# Patient Record
Sex: FEMALE | Race: WHITE | NOT HISPANIC OR LATINO | Employment: OTHER | RURAL
[De-identification: names, ages, dates, MRNs, and addresses within clinical notes are randomized per-mention and may not be internally consistent; named-entity substitution may affect disease eponyms.]

---

## 2018-11-06 VITALS
RESPIRATION RATE: 16 BRPM | WEIGHT: 170.8 LBS | HEART RATE: 100 BPM | OXYGEN SATURATION: 92 % | BODY MASS INDEX: 33.53 KG/M2 | SYSTOLIC BLOOD PRESSURE: 144 MMHG | DIASTOLIC BLOOD PRESSURE: 88 MMHG | HEIGHT: 60 IN

## 2018-11-06 RX ORDER — LOSARTAN POTASSIUM 50 MG/1
100 TABLET ORAL DAILY
COMMUNITY
End: 2021-04-07 | Stop reason: SDUPTHER

## 2018-11-06 RX ORDER — CLONIDINE 0.2 MG/24H
1 PATCH, EXTENDED RELEASE TRANSDERMAL WEEKLY
COMMUNITY
End: 2018-12-06 | Stop reason: SDUPTHER

## 2018-11-06 RX ORDER — FENTANYL 75 UG/H
1 PATCH TRANSDERMAL
COMMUNITY
End: 2018-11-07 | Stop reason: ALTCHOICE

## 2018-11-06 RX ORDER — AMLODIPINE BESYLATE 5 MG/1
5 TABLET ORAL DAILY
COMMUNITY
End: 2018-11-07 | Stop reason: DRUGHIGH

## 2018-11-06 RX ORDER — FUROSEMIDE 40 MG/1
40 TABLET ORAL DAILY
COMMUNITY
End: 2019-07-19 | Stop reason: SDUPTHER

## 2018-11-06 RX ORDER — PANTOPRAZOLE SODIUM 40 MG/1
40 TABLET, DELAYED RELEASE ORAL DAILY
COMMUNITY
End: 2020-05-11

## 2018-11-07 ENCOUNTER — OFFICE VISIT (OUTPATIENT)
Dept: FAMILY MEDICINE CLINIC | Facility: CLINIC | Age: 68
End: 2018-11-07

## 2018-11-07 VITALS
WEIGHT: 161.6 LBS | HEART RATE: 75 BPM | DIASTOLIC BLOOD PRESSURE: 78 MMHG | HEIGHT: 60 IN | SYSTOLIC BLOOD PRESSURE: 128 MMHG | BODY MASS INDEX: 31.73 KG/M2

## 2018-11-07 DIAGNOSIS — I10 ESSENTIAL HYPERTENSION: Primary | ICD-10-CM

## 2018-11-07 DIAGNOSIS — F32.9 REACTIVE DEPRESSION: ICD-10-CM

## 2018-11-07 DIAGNOSIS — J41.8 MIXED SIMPLE AND MUCOPURULENT CHRONIC BRONCHITIS (HCC): ICD-10-CM

## 2018-11-07 DIAGNOSIS — B35.4 TINEA CORPORIS: ICD-10-CM

## 2018-11-07 DIAGNOSIS — F41.9 ANXIETY: ICD-10-CM

## 2018-11-07 DIAGNOSIS — M47.819 ARTHRITIS, LOW BACK: ICD-10-CM

## 2018-11-07 PROCEDURE — 99214 OFFICE O/P EST MOD 30 MIN: CPT | Performed by: FAMILY MEDICINE

## 2018-11-07 RX ORDER — NABUMETONE 500 MG/1
500 TABLET, FILM COATED ORAL 2 TIMES DAILY PRN
Qty: 60 TABLET | Refills: 5 | Status: SHIPPED | OUTPATIENT
Start: 2018-11-07 | End: 2018-12-06 | Stop reason: SDUPTHER

## 2018-11-07 RX ORDER — CYCLOBENZAPRINE HCL 10 MG
10 TABLET ORAL 3 TIMES DAILY
Refills: 5 | COMMUNITY
Start: 2018-10-20 | End: 2018-11-07 | Stop reason: ALTCHOICE

## 2018-11-07 RX ORDER — PROMETHAZINE HYDROCHLORIDE 25 MG/1
25 TABLET ORAL DAILY PRN
Refills: 5 | COMMUNITY
Start: 2018-10-21 | End: 2018-11-07 | Stop reason: ALTCHOICE

## 2018-11-07 RX ORDER — TERBINAFINE HYDROCHLORIDE 250 MG/1
250 TABLET ORAL DAILY
Qty: 7 TABLET | Refills: 0 | Status: SHIPPED | OUTPATIENT
Start: 2018-11-07 | End: 2018-11-13 | Stop reason: SDUPTHER

## 2018-11-07 RX ORDER — KETOCONAZOLE 20 MG/G
CREAM TOPICAL DAILY
Qty: 60 G | Refills: 5 | Status: SHIPPED | OUTPATIENT
Start: 2018-11-07 | End: 2020-08-10 | Stop reason: ALTCHOICE

## 2018-11-07 RX ORDER — MIRTAZAPINE 15 MG/1
15 TABLET, FILM COATED ORAL NIGHTLY
Qty: 30 TABLET | Refills: 5 | Status: SHIPPED | OUTPATIENT
Start: 2018-11-07 | End: 2019-01-04 | Stop reason: SDUPTHER

## 2018-11-07 RX ORDER — AMLODIPINE BESYLATE 10 MG/1
10 TABLET ORAL DAILY
Refills: 5 | COMMUNITY
Start: 2018-10-05 | End: 2019-02-26 | Stop reason: ALTCHOICE

## 2018-11-07 RX ORDER — ALPRAZOLAM 1 MG/1
1 TABLET ORAL 2 TIMES DAILY PRN
Qty: 30 TABLET | Refills: 1 | Status: SHIPPED | OUTPATIENT
Start: 2018-11-07 | End: 2018-12-05 | Stop reason: SDUPTHER

## 2018-11-07 RX ORDER — METOCLOPRAMIDE 5 MG/1
TABLET ORAL
Refills: 5 | COMMUNITY
Start: 2018-10-12 | End: 2019-01-05 | Stop reason: SDUPTHER

## 2018-11-07 NOTE — PATIENT INSTRUCTIONS
Generalized Anxiety Disorder, Adult  Generalized anxiety disorder (SHENG) is a mental health disorder. People with this condition constantly worry about everyday events. Unlike normal anxiety, worry related to SHENG is not triggered by a specific event. These worries also do not fade or get better with time. SHENG interferes with life functions, including relationships, work, and school.  SHENG can vary from mild to severe. People with severe SHENG can have intense waves of anxiety with physical symptoms (panic attacks).  What are the causes?  The exact cause of SHENG is not known.  What increases the risk?  This condition is more likely to develop in:  · Women.  · People who have a family history of anxiety disorders.  · People who are very shy.  · People who experience very stressful life events, such as the death of a loved one.  · People who have a very stressful family environment.    What are the signs or symptoms?  People with SHENG often worry excessively about many things in their lives, such as their health and family. They may also be overly concerned about:  · Doing well at work.  · Being on time.  · Natural disasters.  · Friendships.    Physical symptoms of SHENG include:  · Fatigue.  · Muscle tension or having muscle twitches.  · Trembling or feeling shaky.  · Being easily startled.  · Feeling like your heart is pounding or racing.  · Feeling out of breath or like you cannot take a deep breath.  · Having trouble falling asleep or staying asleep.  · Sweating.  · Nausea, diarrhea, or irritable bowel syndrome (IBS).  · Headaches.  · Trouble concentrating or remembering facts.  · Restlessness.  · Irritability.    How is this diagnosed?  Your health care provider can diagnose SHENG based on your symptoms and medical history. You will also have a physical exam. The health care provider will ask specific questions about your symptoms, including how severe they are, when they started, and if they come and go. Your health care  provider may ask you about your use of alcohol or drugs, including prescription medicines. Your health care provider may refer you to a mental health specialist for further evaluation.  Your health care provider will do a thorough examination and may perform additional tests to rule out other possible causes of your symptoms.  To be diagnosed with SHENG, a person must have anxiety that:  · Is out of his or her control.  · Affects several different aspects of his or her life, such as work and relationships.  · Causes distress that makes him or her unable to take part in normal activities.  · Includes at least three physical symptoms of SHENG, such as restlessness, fatigue, trouble concentrating, irritability, muscle tension, or sleep problems.    Before your health care provider can confirm a diagnosis of SHENG, these symptoms must be present more days than they are not, and they must last for six months or longer.  How is this treated?  The following therapies are usually used to treat SHENG:  · Medicine. Antidepressant medicine is usually prescribed for long-term daily control. Antianxiety medicines may be added in severe cases, especially when panic attacks occur.  · Talk therapy (psychotherapy). Certain types of talk therapy can be helpful in treating SHENG by providing support, education, and guidance. Options include:  ? Cognitive behavioral therapy (CBT). People learn coping skills and techniques to ease their anxiety. They learn to identify unrealistic or negative thoughts and behaviors and to replace them with positive ones.  ? Acceptance and commitment therapy (ACT). This treatment teaches people how to be mindful as a way to cope with unwanted thoughts and feelings.  ? Biofeedback. This process trains you to manage your body's response (physiological response) through breathing techniques and relaxation methods. You will work with a therapist while machines are used to monitor your physical symptoms.  · Stress  management techniques. These include yoga, meditation, and exercise.    A mental health specialist can help determine which treatment is best for you. Some people see improvement with one type of therapy. However, other people require a combination of therapies.  Follow these instructions at home:  · Take over-the-counter and prescription medicines only as told by your health care provider.  · Try to maintain a normal routine.  · Try to anticipate stressful situations and allow extra time to manage them.  · Practice any stress management or self-calming techniques as taught by your health care provider.  · Do not punish yourself for setbacks or for not making progress.  · Try to recognize your accomplishments, even if they are small.  · Keep all follow-up visits as told by your health care provider. This is important.  Contact a health care provider if:  · Your symptoms do not get better.  · Your symptoms get worse.  · You have signs of depression, such as:  ? A persistently sad, cranky, or irritable mood.  ? Loss of enjoyment in activities that used to bring you bella.  ? Change in weight or eating.  ? Changes in sleeping habits.  ? Avoiding friends or family members.  ? Loss of energy for normal tasks.  ? Feelings of guilt or worthlessness.  Get help right away if:  · You have serious thoughts about hurting yourself or others.  If you ever feel like you may hurt yourself or others, or have thoughts about taking your own life, get help right away. You can go to your nearest emergency department or call:  · Your local emergency services (911 in the [place]  · A suicide crisis helpline, such as the National Suicide Prevention Lifeline at 1-873.503.6980. This is open 24 hours a day.    Summary  · Generalized anxiety disorder (SHENG) is a mental health disorder that involves worry that is not triggered by a specific event.  · People with SHENG often worry excessively about many things in their lives, such as their health and  family.  · SHENG may cause physical symptoms such as restlessness, trouble concentrating, sleep problems, frequent sweating, nausea, diarrhea, headaches, and trembling or muscle twitching.  · A mental health specialist can help determine which treatment is best for you. Some people see improvement with one type of therapy. However, other people require a combination of therapies.  This information is not intended to replace advice given to you by your health care provider. Make sure you discuss any questions you have with your health care provider.  Document Released: 04/14/2014 Document Revised: 11/07/2017 Document Reviewed: 11/07/2017  [employer] Interactive Patient Education © 2018 [employer]    Body Ringworm  Body ringworm is an infection of the skin that often causes a ring-shaped rash. Body ringworm can affect any part of your skin. It can spread easily to others. Body ringworm is also called tinea corporis.  What are the causes?  This condition is caused by funguses called dermatophytes. The condition develops when these funguses grow out of control on the skin.  You can get this condition if you touch a person or animal that has it. You can also get it if you share clothing, bedding, towels, or any other object with an infected person or pet.  What increases the risk?  This condition is more likely to develop in:  · Athletes who often make skin-to-skin contact with other athletes, such as wrestlers.  · People who share equipment and mats.  · People with a weakened immune system.    What are the signs or symptoms?  Symptoms of this condition include:  · Itchy, raised red spots and bumps.  · Red scaly patches.  · A ring-shaped rash. The rash may have:  ? A clear center.  ? Scales or red bumps at its center.  ? Redness near its borders.  ? Dry and scaly skin on or around it.    How is this diagnosed?  This condition can usually be diagnosed with a skin exam. A skin scraping may be taken from the affected area  and examined under a microscope to see if the fungus is present.  How is this treated?  This condition may be treated with:  · An antifungal cream or ointment.  · An antifungal shampoo.  · Antifungal medicines. These may be prescribed if your ringworm is severe, keeps coming back, or lasts a long time.    Follow these instructions at home:  · Take over-the-counter and prescription medicines only as told by your health care provider.  · If you were given an antifungal cream or ointment:  ? Use it as told by your health care provider.  ? Wash the infected area and dry it completely before applying the cream or ointment.  · If you were given an antifungal shampoo:  ? Use it as told by your health care provider.  ? Leave the shampoo on your body for 3-5 minutes before rinsing.  · While you have a rash:  ? Wear loose clothing to stop clothes from rubbing and irritating it.  ? Wash or change your bed sheets every night.  · If your pet has the same infection, take your pet to see a .  How is this prevented?  · Practice good hygiene.  · Wear sandals or shoes in public places and showers.  · Do not share personal items with others.  · Avoid touching red patches of skin on other people.  · Avoid touching pets that have bald spots.  · If you touch an animal that has a bald spot, wash your hands.  Contact a health care provider if:  · Your rash continues to spread after 7 days of treatment.  · Your rash is not gone in 4 weeks.  · The area around your rash gets red, warm, tender, and swollen.  This information is not intended to replace advice given to you by your health care provider. Make sure you discuss any questions you have with your health care provider.  Document Released: 12/15/2001 Document Revised: 05/25/2017 Document Reviewed: 10/13/2016  [employer] Interactive Patient Education © 2018 [employer]

## 2018-11-07 NOTE — PROGRESS NOTES
Not eating and sleeping.  D/C's Fentanyl patch herself - memory is some better now, constipation better, less falls.  Was placed on Valium 10mg daily years ago - daughter  in December (Carlyle Bennett) and from -end , she has increased stressors. Valium before, but not helping for a while.  Also, has a rash on hypogastric area and in femoral folds.

## 2018-11-08 ENCOUNTER — TELEPHONE (OUTPATIENT)
Dept: FAMILY MEDICINE CLINIC | Facility: CLINIC | Age: 68
End: 2018-11-08

## 2018-11-08 NOTE — PROGRESS NOTES
Subjective   Shanell Coffman is a 68 y.o. female here today for pain follow up; Hypertension (f/u ); and abdominal sore.     Hypertension   This is a chronic problem. The current episode started more than 1 year ago. The problem is unchanged. The problem is controlled. Associated symptoms include anxiety. Pertinent negatives include no chest pain, headaches, palpitations, peripheral edema or shortness of breath. There are no associated agents to hypertension. Risk factors for coronary artery disease include dyslipidemia, obesity, post-menopausal state, sedentary lifestyle, stress and family history. There are no compliance problems.    Rash   This is a new problem. The current episode started 1 to 4 weeks ago. The problem has been gradually worsening since onset. The affected locations include the abdomen. The rash is characterized by itchiness, dryness and redness. She was exposed to nothing. Pertinent negatives include no fatigue, fever or shortness of breath. Past treatments include anti-itch cream. The treatment provided mild relief.        The following portions of the patient's history were reviewed and updated as appropriate: history reviewed: Social history , Past Medical History, Allergies, Current Medications, Active Problem List and Health Maintenance.    Review of Systems   Constitutional: Negative for activity change, appetite change, fatigue, fever, unexpected weight gain and unexpected weight loss.   Respiratory: Negative for shortness of breath.    Cardiovascular: Negative for chest pain and palpitations.   Skin: Positive for rash.   Neurological: Negative for syncope and headache.   Psychiatric/Behavioral: Positive for sleep disturbance and depressed mood. The patient is nervous/anxious.        Objective   Physical Exam   Constitutional: She is oriented to person, place, and time. She appears well-developed and well-nourished.   HENT:   Head: Normocephalic and atraumatic.   Nose: Nose normal.    Mouth/Throat: Oropharynx is clear and moist.   Eyes: Conjunctivae and EOM are normal.   Neck: Normal range of motion. Neck supple.   Cardiovascular: Normal rate, regular rhythm, normal heart sounds and intact distal pulses.    Pulmonary/Chest: Effort normal and breath sounds normal. No respiratory distress. She has no wheezes.   Abdominal: Soft. Bowel sounds are normal. There is no tenderness.   Musculoskeletal: Normal range of motion. She exhibits edema.   Neurological: She is alert and oriented to person, place, and time. No cranial nerve deficit.   Skin: Skin is warm and dry. Capillary refill takes less than 2 seconds. Rash noted.   Tinea hypogastric area.   Psychiatric: She has a normal mood and affect.   Nursing note and vitals reviewed.      Procedures    Assessment/Plan   Problem List Items Addressed This Visit     Essential hypertension - Primary    Relevant Medications    amLODIPine (NORVASC) 10 MG tablet    Mixed simple and mucopurulent chronic bronchitis (CMS/HCC)    Anxiety    Relevant Medications    mirtazapine (REMERON) 15 MG tablet    ALPRAZolam (XANAX) 1 MG tablet    Reactive depression    Relevant Medications    mirtazapine (REMERON) 15 MG tablet    ALPRAZolam (XANAX) 1 MG tablet    Arthritis, low back (CMS/HCC)    Relevant Medications    nabumetone (RELAFEN) 500 MG tablet    Tinea corporis    Relevant Medications    terbinafine (LAMISIL) 250 MG tablet    ketoconazole (NIZORAL) 2 % cream

## 2018-11-13 DIAGNOSIS — B35.4 TINEA CORPORIS: ICD-10-CM

## 2018-11-13 RX ORDER — TERBINAFINE HYDROCHLORIDE 250 MG/1
250 TABLET ORAL DAILY
Qty: 7 TABLET | Refills: 0 | Status: SHIPPED | OUTPATIENT
Start: 2018-11-13 | End: 2018-12-05 | Stop reason: SDUPTHER

## 2018-11-13 NOTE — TELEPHONE ENCOUNTER
LAMISIL 250 MG  ( TERBINASINE)   *SHE IS A LITTLE BETTER, AND HEALING A LITTLE MORE THAN LAST WEEK. HER SORE IS LOOKING A LITTLE BETTER EXCEPT THE ONE SPOT ON HER STOMACH / PELVIC.

## 2018-11-20 ENCOUNTER — CLINICAL SUPPORT (OUTPATIENT)
Dept: FAMILY MEDICINE CLINIC | Facility: CLINIC | Age: 68
End: 2018-11-20

## 2018-11-20 DIAGNOSIS — Z23 PNEUMOCOCCAL VACCINE ADMINISTERED: Primary | ICD-10-CM

## 2018-11-20 PROCEDURE — G0009 ADMIN PNEUMOCOCCAL VACCINE: HCPCS | Performed by: FAMILY MEDICINE

## 2018-11-20 PROCEDURE — 90732 PPSV23 VACC 2 YRS+ SUBQ/IM: CPT | Performed by: FAMILY MEDICINE

## 2018-12-05 DIAGNOSIS — F41.9 ANXIETY: ICD-10-CM

## 2018-12-05 DIAGNOSIS — B35.4 TINEA CORPORIS: ICD-10-CM

## 2018-12-05 RX ORDER — ALPRAZOLAM 1 MG/1
1 TABLET ORAL 2 TIMES DAILY PRN
Qty: 28 TABLET | Refills: 0 | Status: SHIPPED | OUTPATIENT
Start: 2018-12-05 | End: 2018-12-19

## 2018-12-05 RX ORDER — ALPRAZOLAM 1 MG/1
1 TABLET ORAL 2 TIMES DAILY PRN
Qty: 30 TABLET | Refills: 1 | Status: CANCELLED | OUTPATIENT
Start: 2018-12-05

## 2018-12-05 RX ORDER — TERBINAFINE HYDROCHLORIDE 250 MG/1
250 TABLET ORAL DAILY
Qty: 14 TABLET | Refills: 0 | Status: SHIPPED | OUTPATIENT
Start: 2018-12-05 | End: 2018-12-19

## 2018-12-06 ENCOUNTER — TELEPHONE (OUTPATIENT)
Dept: FAMILY MEDICINE CLINIC | Facility: CLINIC | Age: 68
End: 2018-12-06

## 2018-12-06 DIAGNOSIS — M47.819 ARTHRITIS, LOW BACK: ICD-10-CM

## 2018-12-06 RX ORDER — FLUCONAZOLE 150 MG/1
150 TABLET ORAL ONCE
Qty: 1 TABLET | Refills: 0 | Status: SHIPPED | OUTPATIENT
Start: 2018-12-06 | End: 2018-12-06

## 2018-12-06 RX ORDER — NABUMETONE 500 MG/1
500 TABLET, FILM COATED ORAL 2 TIMES DAILY PRN
Qty: 60 TABLET | Refills: 5 | Status: SHIPPED | OUTPATIENT
Start: 2018-12-06 | End: 2019-02-26 | Stop reason: SDUPTHER

## 2018-12-06 RX ORDER — CLONIDINE 0.2 MG/24H
1 PATCH, EXTENDED RELEASE TRANSDERMAL WEEKLY
Qty: 4 EACH | Refills: 5 | Status: SHIPPED | OUTPATIENT
Start: 2018-12-06 | End: 2019-03-13

## 2018-12-06 NOTE — TELEPHONE ENCOUNTER
Pt would like a refill on her Catapres tts 2 patch for blood pressure, she stated Dr. Jacobs has not filled this for her before, it was a medication that she was getting from a previous Physician.   She also need a refill on relafen 500 mg  Sent to [facility]

## 2018-12-19 ENCOUNTER — OFFICE VISIT (OUTPATIENT)
Dept: FAMILY MEDICINE CLINIC | Facility: CLINIC | Age: 68
End: 2018-12-19

## 2018-12-19 VITALS
DIASTOLIC BLOOD PRESSURE: 70 MMHG | HEIGHT: 60 IN | WEIGHT: 164 LBS | OXYGEN SATURATION: 97 % | BODY MASS INDEX: 32.2 KG/M2 | SYSTOLIC BLOOD PRESSURE: 120 MMHG | HEART RATE: 84 BPM | TEMPERATURE: 98.4 F

## 2018-12-19 DIAGNOSIS — F41.9 ANXIETY: ICD-10-CM

## 2018-12-19 DIAGNOSIS — B35.4 TINEA CORPORIS: ICD-10-CM

## 2018-12-19 PROCEDURE — 99214 OFFICE O/P EST MOD 30 MIN: CPT | Performed by: FAMILY MEDICINE

## 2018-12-19 RX ORDER — OXYCODONE AND ACETAMINOPHEN 7.5; 325 MG/1; MG/1
1 TABLET ORAL 4 TIMES DAILY
Refills: 0 | COMMUNITY
Start: 2018-12-10 | End: 2019-12-04

## 2018-12-19 RX ORDER — CYCLOBENZAPRINE HCL 10 MG
10 TABLET ORAL 3 TIMES DAILY
Refills: 5 | COMMUNITY
Start: 2018-11-18 | End: 2019-03-13 | Stop reason: ALTCHOICE

## 2018-12-19 RX ORDER — ALPRAZOLAM 1 MG/1
1 TABLET ORAL 3 TIMES DAILY PRN
Qty: 90 TABLET | Refills: 2 | Status: SHIPPED | OUTPATIENT
Start: 2018-12-19 | End: 2019-02-26 | Stop reason: SDUPTHER

## 2018-12-19 RX ORDER — FENTANYL 75 UG/H
PATCH TRANSDERMAL
Refills: 0 | COMMUNITY
Start: 2018-11-30 | End: 2019-03-13 | Stop reason: ALTCHOICE

## 2018-12-19 RX ORDER — FLUCONAZOLE 150 MG/1
150 TABLET ORAL ONCE
Refills: 0 | COMMUNITY
Start: 2018-12-06 | End: 2019-03-13 | Stop reason: ALTCHOICE

## 2018-12-19 RX ORDER — TERBINAFINE HYDROCHLORIDE 250 MG/1
250 TABLET ORAL DAILY
Qty: 14 TABLET | Refills: 2 | Status: SHIPPED | OUTPATIENT
Start: 2018-12-19 | End: 2019-02-27 | Stop reason: SDUPTHER

## 2018-12-19 RX ORDER — DICYCLOMINE HCL 20 MG
TABLET ORAL
Refills: 2 | COMMUNITY
Start: 2018-12-09 | End: 2020-10-16

## 2018-12-19 RX ORDER — LEVOFLOXACIN 500 MG/1
500 TABLET, FILM COATED ORAL DAILY
Refills: 0 | COMMUNITY
Start: 2018-12-01 | End: 2019-02-26

## 2018-12-19 NOTE — PATIENT INSTRUCTIONS
Generalized Anxiety Disorder, Adult  Generalized anxiety disorder (SHENG) is a mental health disorder. People with this condition constantly worry about everyday events. Unlike normal anxiety, worry related to SHENG is not triggered by a specific event. These worries also do not fade or get better with time. SHENG interferes with life functions, including relationships, work, and school.  SHENG can vary from mild to severe. People with severe SHENG can have intense waves of anxiety with physical symptoms (panic attacks).  What are the causes?  The exact cause of SHENG is not known.  What increases the risk?  This condition is more likely to develop in:  · Women.  · People who have a family history of anxiety disorders.  · People who are very shy.  · People who experience very stressful life events, such as the death of a loved one.  · People who have a very stressful family environment.    What are the signs or symptoms?  People with SHENG often worry excessively about many things in their lives, such as their health and family. They may also be overly concerned about:  · Doing well at work.  · Being on time.  · Natural disasters.  · Friendships.    Physical symptoms of SHENG include:  · Fatigue.  · Muscle tension or having muscle twitches.  · Trembling or feeling shaky.  · Being easily startled.  · Feeling like your heart is pounding or racing.  · Feeling out of breath or like you cannot take a deep breath.  · Having trouble falling asleep or staying asleep.  · Sweating.  · Nausea, diarrhea, or irritable bowel syndrome (IBS).  · Headaches.  · Trouble concentrating or remembering facts.  · Restlessness.  · Irritability.    How is this diagnosed?  Your health care provider can diagnose SHENG based on your symptoms and medical history. You will also have a physical exam. The health care provider will ask specific questions about your symptoms, including how severe they are, when they started, and if they come and go. Your health care  provider may ask you about your use of alcohol or drugs, including prescription medicines. Your health care provider may refer you to a mental health specialist for further evaluation.  Your health care provider will do a thorough examination and may perform additional tests to rule out other possible causes of your symptoms.  To be diagnosed with SHENG, a person must have anxiety that:  · Is out of his or her control.  · Affects several different aspects of his or her life, such as work and relationships.  · Causes distress that makes him or her unable to take part in normal activities.  · Includes at least three physical symptoms of SHENG, such as restlessness, fatigue, trouble concentrating, irritability, muscle tension, or sleep problems.    Before your health care provider can confirm a diagnosis of SHENG, these symptoms must be present more days than they are not, and they must last for six months or longer.  How is this treated?  The following therapies are usually used to treat SHENG:  · Medicine. Antidepressant medicine is usually prescribed for long-term daily control. Antianxiety medicines may be added in severe cases, especially when panic attacks occur.  · Talk therapy (psychotherapy). Certain types of talk therapy can be helpful in treating SHENG by providing support, education, and guidance. Options include:  ? Cognitive behavioral therapy (CBT). People learn coping skills and techniques to ease their anxiety. They learn to identify unrealistic or negative thoughts and behaviors and to replace them with positive ones.  ? Acceptance and commitment therapy (ACT). This treatment teaches people how to be mindful as a way to cope with unwanted thoughts and feelings.  ? Biofeedback. This process trains you to manage your body's response (physiological response) through breathing techniques and relaxation methods. You will work with a therapist while machines are used to monitor your physical symptoms.  · Stress  management techniques. These include yoga, meditation, and exercise.    A mental health specialist can help determine which treatment is best for you. Some people see improvement with one type of therapy. However, other people require a combination of therapies.  Follow these instructions at home:  · Take over-the-counter and prescription medicines only as told by your health care provider.  · Try to maintain a normal routine.  · Try to anticipate stressful situations and allow extra time to manage them.  · Practice any stress management or self-calming techniques as taught by your health care provider.  · Do not punish yourself for setbacks or for not making progress.  · Try to recognize your accomplishments, even if they are small.  · Keep all follow-up visits as told by your health care provider. This is important.  Contact a health care provider if:  · Your symptoms do not get better.  · Your symptoms get worse.  · You have signs of depression, such as:  ? A persistently sad, cranky, or irritable mood.  ? Loss of enjoyment in activities that used to bring you bella.  ? Change in weight or eating.  ? Changes in sleeping habits.  ? Avoiding friends or family members.  ? Loss of energy for normal tasks.  ? Feelings of guilt or worthlessness.  Get help right away if:  · You have serious thoughts about hurting yourself or others.  If you ever feel like you may hurt yourself or others, or have thoughts about taking your own life, get help right away. You can go to your nearest emergency department or call:  · Your local emergency services (911 in the [place]  · A suicide crisis helpline, such as the National Suicide Prevention Lifeline at 1-461.282.9191. This is open 24 hours a day.    Summary  · Generalized anxiety disorder (SHENG) is a mental health disorder that involves worry that is not triggered by a specific event.  · People with SHENG often worry excessively about many things in their lives, such as their health and  family.  · SHENG may cause physical symptoms such as restlessness, trouble concentrating, sleep problems, frequent sweating, nausea, diarrhea, headaches, and trembling or muscle twitching.  · A mental health specialist can help determine which treatment is best for you. Some people see improvement with one type of therapy. However, other people require a combination of therapies.  This information is not intended to replace advice given to you by your health care provider. Make sure you discuss any questions you have with your health care provider.  Document Released: 04/14/2014 Document Revised: 11/07/2017 Document Reviewed: 11/07/2017  [employer] Interactive Patient Education © 2018 [employer]

## 2018-12-20 ENCOUNTER — TELEPHONE (OUTPATIENT)
Dept: FAMILY MEDICINE CLINIC | Facility: CLINIC | Age: 68
End: 2018-12-20

## 2018-12-20 NOTE — TELEPHONE ENCOUNTER
PT CALLING IN [place]OT TO ASK FOR   THE OYSTER SHELL PILLS FOR HER OSTEOARTHRITIS, THAT DR RUBIO HAD STARTED HER ON.     REFILL AT [facility]

## 2018-12-22 NOTE — PROGRESS NOTES
OFFICE VISIT NOTE:    Shanell Coffman is a 68 y.o. female who presents today for Anxiety and fungul infection.     Has tinea corpora in inguinal folds on occasion - used a cream before and it worked well.      Anxiety   Presents for follow-up visit. Symptoms include depressed mood and excessive worry. Patient reports no chest pain, insomnia, irritability, nervous/anxious behavior, obsessions, palpitations, restlessness, shortness of breath or suicidal ideas. Symptoms occur most days. The severity of symptoms is causing significant distress and interfering with daily activities. The quality of sleep is good. Nighttime awakenings: occasional.     Compliance with medications is %.        Past medical/surgical history, Family history, Social history, Allergies and Medications have been reviewed with the patient today and are updated in [facility] EMR. See below.    Past Medical History:   Diagnosis Date   • Anxiety    • Chronic pain    • COPD (chronic obstructive pulmonary disease) (CMS/HCC)    • GERD (gastroesophageal reflux disease)    • Hyperlipidemia    • Hypertension      Past Surgical History:   Procedure Laterality Date   • APPENDECTOMY     • CHOLECYSTECTOMY     • COLON SURGERY     • TUBAL ABDOMINAL LIGATION       Family History   Problem Relation Age of Onset   • Heart disease Mother    • Hyperlipidemia Mother    • Hypertension Mother    • Cancer Father    • Mental illness Father    • Diabetes Maternal Grandmother    • Heart attack Maternal Grandfather    • Obesity Paternal Grandmother    • Stroke Paternal Grandfather      Social History     Tobacco Use   • Smoking status: Never Smoker   • Smokeless tobacco: Never Used   Substance Use Topics   • Alcohol use: No   • Drug use: No       Allergies:  Ceclor [cefaclor]; Keflex [cephalexin]; and Stadol [butorphanol]      Review of Systems:    Review of Systems   Constitutional: Negative for activity change, appetite change, fatigue, fever and irritability.   Respiratory:  "Negative for cough and shortness of breath.    Cardiovascular: Negative for chest pain and palpitations.   Skin: Negative for rash.   Neurological: Negative for syncope and headache.   Psychiatric/Behavioral: Positive for depressed mood and stress. Negative for self-injury, sleep disturbance and suicidal ideas. The patient is not nervous/anxious and does not have insomnia.          Physical Examination:  Vital Signs:  /70 (BP Location: Left arm, Patient Position: Sitting, Cuff Size: Adult)   Pulse 84   Temp 98.4 °F (36.9 °C) (Tympanic)   Ht 152.4 cm (60\")   Wt 74.4 kg (164 lb)   LMP  (LMP Unknown)   SpO2 97%   BMI 32.03 kg/m²   Physical Exam   Constitutional: She is oriented to person, place, and time. She appears well-developed and well-nourished. No distress.   HENT:   Head: Normocephalic and atraumatic.   Mouth/Throat: Oropharynx is clear and moist.   Eyes: Conjunctivae and EOM are normal.   Neck: Normal range of motion. Neck supple. No JVD present.   Cardiovascular: Normal rate, regular rhythm and normal heart sounds.   Pulmonary/Chest: Effort normal and breath sounds normal. No respiratory distress.   Abdominal: Soft. She exhibits no distension. There is no tenderness.   Musculoskeletal: Normal range of motion. She exhibits no edema.   Neurological: She is alert and oriented to person, place, and time. No cranial nerve deficit.   Skin: Skin is warm and dry. Capillary refill takes less than 2 seconds. No rash noted.   Psychiatric: She has a normal mood and affect. Her behavior is normal.   Nursing note and vitals reviewed.      Procedures      ASSESSMENT/ PLAN:    Shanell was seen today for anxiety and fungul infection.    Diagnoses and all orders for this visit:    Anxiety  -     ALPRAZolam (XANAX) 1 MG tablet; Take 1 tablet by mouth 3 (Three) Times a Day As Needed for Anxiety.    Tinea corporis  -     terbinafine (LAMISIL) 250 MG tablet; Take 1 tablet by mouth Daily.          Current Outpatient " Medications:   •  albuterol (2.5 MG/3ML) 0.083% nebulizer solution 3 mL, albuterol (5 MG/ML) 0.5% nebulizer solution 0.5 mL, Inhale Every 4 (Four) Hours., Disp: , Rfl:   •  ALPRAZolam (XANAX) 1 MG tablet, Take 1 tablet by mouth 3 (Three) Times a Day As Needed for Anxiety., Disp: 90 tablet, Rfl: 2  •  amLODIPine (NORVASC) 10 MG tablet, Take 10 mg by mouth Daily., Disp: , Rfl: 5  •  CloNIDine (CATAPRES-TTS) 0.2 MG/24HR patch, Place 1 patch on the skin as directed by provider 1 (One) Time Per Week., Disp: 4 each, Rfl: 5  •  furosemide (LASIX) 40 MG tablet, Take 40 mg by mouth Daily., Disp: , Rfl:   •  ketoconazole (NIZORAL) 2 % cream, Apply  topically to the appropriate area as directed Daily., Disp: 60 g, Rfl: 5  •  losartan (COZAAR) 50 MG tablet, Take 100 mg by mouth Daily., Disp: , Rfl:   •  metoclopramide (REGLAN) 5 MG tablet, TAKE 1 TABLET BY MOUTH 4 TIMES A DAY AS NEEDED, Disp: , Rfl: 5  •  mirtazapine (REMERON) 15 MG tablet, Take 1 tablet by mouth Every Night., Disp: 30 tablet, Rfl: 5  •  nabumetone (RELAFEN) 500 MG tablet, Take 1 tablet by mouth 2 (Two) Times a Day As Needed for Moderate Pain ., Disp: 60 tablet, Rfl: 5  •  O2 (OXYGEN), Inhale 2 L/min 1 (One) Time., Disp: , Rfl:   •  OYSTER SHELL CALCIUM 500 + D 500-125 MG-UNIT tablet, TAKE 1 TABLET BY MOUTH WITH FOOD, Disp: , Rfl: 2  •  pantoprazole (PROTONIX) 40 MG EC tablet, Take 40 mg by mouth Daily., Disp: , Rfl:   •  terbinafine (LAMISIL) 250 MG tablet, Take 1 tablet by mouth Daily., Disp: 14 tablet, Rfl: 2  •  calcium-vitamin D (OSCAL 500/200 D-3) 500-200 MG-UNIT per tablet, Take 1 tablet by mouth Daily., Disp: 30 tablet, Rfl: 11  •  cyclobenzaprine (FLEXERIL) 10 MG tablet, Take 10 mg by mouth 3 (Three) Times a Day., Disp: , Rfl: 5  •  dicyclomine (BENTYL) 20 MG tablet, TAKE 1 TABLET BY MOUTH 4 TIMES A DAY PRB, Disp: , Rfl: 2  •  fentaNYL (DURAGESIC) 75 MCG/HR patch, USE 1 PATCH EVERY 3 DAYS, Disp: , Rfl: 0  •  fluconazole (DIFLUCAN) 150 MG tablet, Take  150 mg by mouth 1 (One) Time., Disp: , Rfl: 0  •  levoFLOXacin (LEVAQUIN) 500 MG tablet, Take 500 mg by mouth Daily., Disp: , Rfl: 0  •  oxyCODONE-acetaminophen (PERCOCET) 7.5-325 MG per tablet, Take 1 tablet by mouth 4 (Four) Times a Day., Disp: , Rfl: 0    FOLLOW-UP:    Return in about 3 months (around 3/19/2019) for Recheck.    I discussed the patients findings and my recommendations with patient.  An After Visit Summary (AVS) was printed and given to the patient at discharge.    Reji Jacobs MD, FAAFP  12/22/2018

## 2019-01-04 DIAGNOSIS — F41.9 ANXIETY: ICD-10-CM

## 2019-01-04 DIAGNOSIS — F32.9 REACTIVE DEPRESSION: ICD-10-CM

## 2019-01-04 RX ORDER — MIRTAZAPINE 15 MG/1
15 TABLET, FILM COATED ORAL NIGHTLY
Qty: 90 TABLET | Refills: 3 | Status: SHIPPED | OUTPATIENT
Start: 2019-01-04 | End: 2019-02-26 | Stop reason: ALTCHOICE

## 2019-01-04 NOTE — TELEPHONE ENCOUNTER
Pharmacy requesting 90 day supply on mirtazapine 15mg.  Patient last seen on 12/19/2018 by Dr Jacobs.

## 2019-01-07 RX ORDER — METOCLOPRAMIDE 5 MG/1
TABLET ORAL
Qty: 120 TABLET | Refills: 5 | Status: SHIPPED | OUTPATIENT
Start: 2019-01-07 | End: 2020-10-16

## 2019-01-09 ENCOUNTER — TELEPHONE (OUTPATIENT)
Dept: FAMILY MEDICINE CLINIC | Facility: CLINIC | Age: 69
End: 2019-01-09

## 2019-01-09 RX ORDER — AZITHROMYCIN 250 MG/1
TABLET, FILM COATED ORAL
Qty: 6 TABLET | Refills: 0 | Status: SHIPPED | OUTPATIENT
Start: 2019-01-09 | End: 2019-03-13

## 2019-01-09 RX ORDER — GUAIFENESIN AND DEXTROMETHORPHAN HYDROBROMIDE 600; 30 MG/1; MG/1
1 TABLET, EXTENDED RELEASE ORAL 2 TIMES DAILY PRN
Qty: 30 EACH | Refills: 1 | Status: SHIPPED | OUTPATIENT
Start: 2019-01-09 | End: 2019-02-26

## 2019-01-09 NOTE — TELEPHONE ENCOUNTER
Patient called because   Her sinuses are bothering her. It is draining into her throat, her throat is sore, and she is coughing up green mucus. She had eye surgery yesterday and the  Told to call her pcp to get it taken care of. [facility] uses [facility] in [place].

## 2019-01-10 RX ORDER — ALBUTEROL SULFATE 2.5 MG/3ML
SOLUTION RESPIRATORY (INHALATION)
Qty: 375 ML | Refills: 0 | Status: SHIPPED | OUTPATIENT
Start: 2019-01-10

## 2019-02-15 ENCOUNTER — TELEPHONE (OUTPATIENT)
Dept: FAMILY MEDICINE CLINIC | Facility: CLINIC | Age: 69
End: 2019-02-15

## 2019-02-15 NOTE — TELEPHONE ENCOUNTER
PT called stating BP was 242/122. Checking to see what she needed to do. Advised to go to ER. PT stated she would if BP stayed elevated. PT had questions about possibly upping BP meds she currently takes. Kizzy spoke with Dr. Jacobs, he ok'd to double up on Amlodipine. MD stated for PT to make appt for next week. PT wants to keep scheduled appt for 2/26/19. W/C if anything changes.

## 2019-02-26 ENCOUNTER — OFFICE VISIT (OUTPATIENT)
Dept: FAMILY MEDICINE CLINIC | Facility: CLINIC | Age: 69
End: 2019-02-26

## 2019-02-26 VITALS
HEIGHT: 60 IN | OXYGEN SATURATION: 95 % | SYSTOLIC BLOOD PRESSURE: 150 MMHG | DIASTOLIC BLOOD PRESSURE: 94 MMHG | HEART RATE: 83 BPM | WEIGHT: 159.8 LBS | TEMPERATURE: 97.5 F | BODY MASS INDEX: 31.37 KG/M2

## 2019-02-26 DIAGNOSIS — F51.01 PRIMARY INSOMNIA: ICD-10-CM

## 2019-02-26 DIAGNOSIS — J41.8 MIXED SIMPLE AND MUCOPURULENT CHRONIC BRONCHITIS (HCC): ICD-10-CM

## 2019-02-26 DIAGNOSIS — F41.9 ANXIETY: ICD-10-CM

## 2019-02-26 DIAGNOSIS — M47.819 ARTHRITIS, LOW BACK: ICD-10-CM

## 2019-02-26 DIAGNOSIS — I10 ESSENTIAL HYPERTENSION: Primary | ICD-10-CM

## 2019-02-26 PROCEDURE — 99214 OFFICE O/P EST MOD 30 MIN: CPT | Performed by: FAMILY MEDICINE

## 2019-02-26 RX ORDER — PROMETHAZINE HYDROCHLORIDE 25 MG/1
TABLET ORAL
Refills: 5 | COMMUNITY
Start: 2019-01-18 | End: 2021-01-22

## 2019-02-26 RX ORDER — DOXEPIN HYDROCHLORIDE 25 MG/1
25 CAPSULE ORAL NIGHTLY
Qty: 30 CAPSULE | Refills: 5 | Status: SHIPPED | OUTPATIENT
Start: 2019-02-26 | End: 2019-03-13 | Stop reason: SDUPTHER

## 2019-02-26 RX ORDER — ALPRAZOLAM 1 MG/1
1 TABLET ORAL 3 TIMES DAILY PRN
Qty: 90 TABLET | Refills: 2 | Status: SHIPPED | OUTPATIENT
Start: 2019-02-26 | End: 2019-03-13 | Stop reason: SDUPTHER

## 2019-02-26 RX ORDER — DILTIAZEM HYDROCHLORIDE 360 MG/1
360 CAPSULE, EXTENDED RELEASE ORAL DAILY
Qty: 30 CAPSULE | Refills: 5 | Status: SHIPPED | OUTPATIENT
Start: 2019-02-26 | End: 2019-06-19 | Stop reason: SDUPTHER

## 2019-02-26 RX ORDER — SPIRONOLACTONE 25 MG/1
25 TABLET ORAL DAILY
Qty: 30 TABLET | Refills: 5 | Status: SHIPPED | OUTPATIENT
Start: 2019-02-26 | End: 2019-07-19 | Stop reason: ALTCHOICE

## 2019-02-26 RX ORDER — NABUMETONE 500 MG/1
500 TABLET, FILM COATED ORAL 2 TIMES DAILY PRN
Qty: 60 TABLET | Refills: 5 | Status: SHIPPED | OUTPATIENT
Start: 2019-02-26 | End: 2019-03-13 | Stop reason: SINTOL

## 2019-02-27 DIAGNOSIS — B35.4 TINEA CORPORIS: ICD-10-CM

## 2019-02-27 NOTE — TELEPHONE ENCOUNTER
Pt was here yesterday - had forgotten to mention that she needed refills on her generic Lamisil 250mg - one tablet daily x 14 days.

## 2019-02-28 RX ORDER — TERBINAFINE HYDROCHLORIDE 250 MG/1
250 TABLET ORAL DAILY
Qty: 14 TABLET | Refills: 2 | Status: SHIPPED | OUTPATIENT
Start: 2019-02-28 | End: 2019-06-04 | Stop reason: SDUPTHER

## 2019-03-01 ENCOUNTER — TELEPHONE (OUTPATIENT)
Dept: FAMILY MEDICINE CLINIC | Facility: CLINIC | Age: 69
End: 2019-03-01

## 2019-03-01 DIAGNOSIS — F51.01 PRIMARY INSOMNIA: Primary | ICD-10-CM

## 2019-03-01 RX ORDER — QUETIAPINE FUMARATE 100 MG/1
100 TABLET, FILM COATED ORAL NIGHTLY
Qty: 30 TABLET | Refills: 5 | Status: SHIPPED | OUTPATIENT
Start: 2019-03-01 | End: 2019-03-13 | Stop reason: SINTOL

## 2019-03-01 NOTE — TELEPHONE ENCOUNTER
Pt is calling to notify Dr. Jacobs that her blood pressure is always high - today at 2.30 pm her blood pressure was 248/130    @ 8am it was 240/120     She doesn't want to go to the ER.     She is wanting to know if she can increase her Seroquil - she is currently taking Seroquil 25mg q hs ,  the nightmares have not stopped, they seem to have been getting more vivid, she has been off Remeron for 4 days.   When she wakes up she is very anxious, and thinks that this is why her blood pressure is increasing.     [phone]

## 2019-03-13 ENCOUNTER — OFFICE VISIT (OUTPATIENT)
Dept: FAMILY MEDICINE CLINIC | Facility: CLINIC | Age: 69
End: 2019-03-13

## 2019-03-13 VITALS
BODY MASS INDEX: 31.1 KG/M2 | HEIGHT: 60 IN | SYSTOLIC BLOOD PRESSURE: 170 MMHG | HEART RATE: 67 BPM | OXYGEN SATURATION: 97 % | WEIGHT: 158.4 LBS | DIASTOLIC BLOOD PRESSURE: 100 MMHG

## 2019-03-13 DIAGNOSIS — F32.9 REACTIVE DEPRESSION: ICD-10-CM

## 2019-03-13 DIAGNOSIS — F41.9 ANXIETY: ICD-10-CM

## 2019-03-13 DIAGNOSIS — F51.01 PRIMARY INSOMNIA: ICD-10-CM

## 2019-03-13 DIAGNOSIS — I10 ESSENTIAL HYPERTENSION: Primary | ICD-10-CM

## 2019-03-13 PROCEDURE — 99213 OFFICE O/P EST LOW 20 MIN: CPT | Performed by: FAMILY MEDICINE

## 2019-03-13 RX ORDER — CLONIDINE 0.2 MG/24H
2 PATCH, EXTENDED RELEASE TRANSDERMAL WEEKLY
Qty: 8 EACH | Refills: 5
Start: 2019-03-13 | End: 2019-03-26 | Stop reason: SDUPTHER

## 2019-03-13 RX ORDER — ALPRAZOLAM 1 MG/1
1 TABLET ORAL 3 TIMES DAILY PRN
Qty: 90 TABLET | Refills: 5 | Status: CANCELLED | OUTPATIENT
Start: 2019-03-13

## 2019-03-13 RX ORDER — ALPRAZOLAM 1 MG/1
1 TABLET ORAL 3 TIMES DAILY PRN
Qty: 90 TABLET | Refills: 2 | Status: SHIPPED | OUTPATIENT
Start: 2019-03-13 | End: 2019-06-04 | Stop reason: SDUPTHER

## 2019-03-13 RX ORDER — DOXEPIN HYDROCHLORIDE 25 MG/1
25 CAPSULE ORAL NIGHTLY
Qty: 30 CAPSULE | Refills: 5 | Status: SHIPPED | OUTPATIENT
Start: 2019-03-13 | End: 2019-04-19 | Stop reason: SDUPTHER

## 2019-03-13 NOTE — PROGRESS NOTES
OFFICE VISIT NOTE:    Shanell Coffman is a 68 y.o. female who presents today for Hypertension (2 week follow up.).     Only change was she stopped the Seroquel 3 days after beginning due to feeling like bugs crawling on her and extremely dry mouth.       Hypertension   This is a chronic problem. The current episode started more than 1 year ago. The problem has been gradually worsening since onset. The problem is uncontrolled. Associated symptoms include malaise/fatigue. Pertinent negatives include no chest pain, headaches, orthopnea, palpitations, peripheral edema, PND or shortness of breath. There are no associated agents to hypertension. Risk factors for coronary artery disease include dyslipidemia, family history, obesity, post-menopausal state, stress and sedentary lifestyle. Past treatments include alpha 1 blockers, angiotensin blockers, calcium channel blockers and diuretics. Current antihypertension treatment includes alpha 1 blockers, angiotensin blockers, calcium channel blockers and diuretics. The current treatment provides significant improvement. There are no compliance problems.         Past medical/surgical history, Family history, Social history, Allergies and Medications have been reviewed with the patient today and are updated in [facility] EMR. See below.    Past Medical History:   Diagnosis Date   • Anxiety    • Chronic pain    • COPD (chronic obstructive pulmonary disease) (CMS/HCC)    • GERD (gastroesophageal reflux disease)    • Hyperlipidemia      Past Surgical History:   Procedure Laterality Date   • APPENDECTOMY     • CHOLECYSTECTOMY     • COLON SURGERY     • TUBAL ABDOMINAL LIGATION       Family History   Problem Relation Age of Onset   • Heart disease Mother    • Hyperlipidemia Mother    • Hypertension Mother    • Cancer Father    • Mental illness Father    • Diabetes Maternal Grandmother    • Heart attack Maternal Grandfather    • Obesity Paternal Grandmother    • Stroke Paternal Grandfather   "    Social History     Tobacco Use   • Smoking status: Never Smoker   • Smokeless tobacco: Never Used   Substance Use Topics   • Alcohol use: No     Frequency: Never     Binge frequency: Never   • Drug use: No       Allergies:  Ceclor [cefaclor]; Keflex [cephalexin]; Stadol [butorphanol]; and Seroquel [quetiapine]      Review of Systems:    Review of Systems   Constitutional: Positive for malaise/fatigue. Negative for activity change, fatigue, fever, unexpected weight gain and unexpected weight loss.   Respiratory: Negative for shortness of breath.    Cardiovascular: Negative for chest pain, palpitations, orthopnea and PND.   Gastrointestinal: Negative for abdominal pain.   Genitourinary: Negative for difficulty urinating.   Skin: Negative for rash.   Neurological: Negative for syncope and headache.         Physical Examination:  Vital Signs:  /100 (BP Location: Left arm, Patient Position: Sitting, Cuff Size: Adult)   Pulse 67   Ht 152.4 cm (60\")   Wt 71.8 kg (158 lb 6.4 oz)   LMP  (LMP Unknown)   SpO2 97%   BMI 30.94 kg/m²   Physical Exam   Constitutional: She is oriented to person, place, and time. She appears well-developed and well-nourished. No distress.   HENT:   Head: Normocephalic and atraumatic.   Mouth/Throat: Oropharynx is clear and moist.   Neck: Normal range of motion. Neck supple. No JVD present.   Cardiovascular: Normal rate, regular rhythm, normal heart sounds and intact distal pulses.   Pulmonary/Chest: Effort normal and breath sounds normal. No respiratory distress.   Musculoskeletal: Normal range of motion. She exhibits no edema.   Neurological: She is alert and oriented to person, place, and time. No cranial nerve deficit.   Skin: Skin is warm and dry. Capillary refill takes less than 2 seconds. No rash noted.   Psychiatric: She has a normal mood and affect. Her behavior is normal.   Nursing note and vitals reviewed.      Procedures      ASSESSMENT/ PLAN:    Shanell was seen today for " hypertension.    Diagnoses and all orders for this visit:    Essential hypertension  -     Cancel: Ambulatory Referral to Nephrology    Anxiety  -     ALPRAZolam (XANAX) 1 MG tablet; Take 1 tablet by mouth 3 (Three) Times a Day As Needed for Anxiety.    Reactive depression    Primary insomnia  -     doxepin (SINEquan) 25 MG capsule; Take 1 capsule by mouth Every Night.    Other orders  -     cloNIDine (CATAPRES-TTS) 0.2 MG/24HR patch; Place 2 patches on the skin as directed by provider 1 (One) Time Per Week.    We recommended a nephrology appt, she prefers to wait for a bit until follow-up visit with me, and if no better, then may consdier it.     Specific Patient Instructions:    MEDICATION Instructions: Encouraged patient to continue routine medicines as prescribed and maintain compliance. Patient instructed to report any adverse side effects or reactions to medicines promptly to the office. Patient instructed to make us aware of any OTC or herbal meds or supplement use.  DIET Recommendations: No new recommendations regarding diet/restrictions.  EXERCISE Instructions: No new recommendations.    SMOKING Recommendations: N/A  HEALTH MAINTENANCE:  N/A  MISCELLANEOUS Instructions: N/A      Medications at completion of visit:      Current Outpatient Medications:   •  albuterol (2.5 MG/3ML) 0.083% nebulizer solution 3 mL, albuterol (5 MG/ML) 0.5% nebulizer solution 0.5 mL, Inhale Every 4 (Four) Hours., Disp: , Rfl:   •  albuterol (PROVENTIL) (2.5 MG/3ML) 0.083% nebulizer solution, USE 3 ML PER NEBULIZER 4 TIMES A DAY, Disp: 375 mL, Rfl: 0  •  ALPRAZolam (XANAX) 1 MG tablet, Take 1 tablet by mouth 3 (Three) Times a Day As Needed for Anxiety., Disp: 90 tablet, Rfl: 2  •  Calcium Carbonate-Vitamin D (OYSTER SHELL CALCIUM 500 + D) 500-125 MG-UNIT tablet, TAKE 1 TABLET BY MOUTH WITH FOOD, Disp: 90 each, Rfl: 2  •  calcium-vitamin D (OSCAL 500/200 D-3) 500-200 MG-UNIT per tablet, Take 1 tablet by mouth Daily., Disp: 30  tablet, Rfl: 11  •  cloNIDine (CATAPRES-TTS) 0.2 MG/24HR patch, Place 2 patches on the skin as directed by provider 1 (One) Time Per Week., Disp: 8 each, Rfl: 5  •  dicyclomine (BENTYL) 20 MG tablet, TAKE 1 TABLET BY MOUTH 4 TIMES A DAY PRB, Disp: , Rfl: 2  •  diltiaZEM (TIAZAC) 360 MG 24 hr capsule, Take 1 capsule by mouth Daily., Disp: 30 capsule, Rfl: 5  •  furosemide (LASIX) 40 MG tablet, Take 40 mg by mouth Daily., Disp: , Rfl:   •  ketoconazole (NIZORAL) 2 % cream, Apply  topically to the appropriate area as directed Daily., Disp: 60 g, Rfl: 5  •  losartan (COZAAR) 50 MG tablet, Take 100 mg by mouth Daily., Disp: , Rfl:   •  metoclopramide (REGLAN) 5 MG tablet, TAKE 1 TABLET BY MOUTH 4 TIMES A DAY AS NEEDED, Disp: 120 tablet, Rfl: 5  •  O2 (OXYGEN), Inhale 2 L/min 1 (One) Time., Disp: , Rfl:   •  oxyCODONE-acetaminophen (PERCOCET) 7.5-325 MG per tablet, Take 1 tablet by mouth 4 (Four) Times a Day., Disp: , Rfl: 0  •  pantoprazole (PROTONIX) 40 MG EC tablet, Take 40 mg by mouth Daily., Disp: , Rfl:   •  promethazine (PHENERGAN) 25 MG tablet, TAKE 1 TABLET(S) BY MOUTH DAILY AS NEEDED NAUSEA/VOMITING, Disp: , Rfl: 5  •  spironolactone (ALDACTONE) 25 MG tablet, Take 1 tablet by mouth Daily., Disp: 30 tablet, Rfl: 5  •  terbinafine (LAMISIL) 250 MG tablet, Take 1 tablet by mouth Daily., Disp: 14 tablet, Rfl: 2  •  doxepin (SINEquan) 25 MG capsule, Take 1 capsule by mouth Every Night., Disp: 30 capsule, Rfl: 5    FOLLOW-UP:    Return in about 3 weeks (around 4/3/2019) for Recheck.    I discussed the patients findings and my recommendations with patient.  An After Visit Summary (AVS) was printed and given to the patient at discharge.    Reji Jacobs MD, FAAFP  3/15/2019

## 2019-03-13 NOTE — PATIENT INSTRUCTIONS
Suspect Essential HTN.Good BP control is encouraged with Goal BP based on JNC 8 guidelines 2014 <140/90 for patients with known cardiac disease and diabetes. (MARQUIS. 2014:322 (5):507-520. doi:10.1001/marquis.2013.00251): general population <60 yr old goal BP <140/90 and for those >60 <150/90.  For patients of all ages with Diabetes, CKD, Known CAD <140/90. Recommended to the patient to obtain electronic home BP machine with upper arm blood pressure cuff and to check regularly as instructed.  Keep BP log and bring to subsequent visits. Stable, at goal.  a. LABS: routine for hypertension recommended and ordered if necessary.  b. Recommend if you do not have a home BP machine to obtain an electronic machine with arm blood pressure cuff.      c. Monitor BP over the next week and keep log to bring back to office. Discussed medication therapy however pt wants to try to control with diet exercise. .  Your provider  has recommended self-monitoring of your blood pressure.  If you do not have a blood pressure cuff you may purchase one from the local pharmacy.  You may ask the pharmacist which brand and model they recommend.  Obtain your blood pressure measurement at least 2x per week.  You should also check your blood pressure if you experience any symptoms of blurred visit, dizziness or headache.  Please record all blood pressure measurements and bring them to next office visit.  If you have any questions about the accuracy of your blood pressure machine please bring it in to the office and our staff will be happy to check accuracy.   d. Encouraged to eat a low sodium heart healthy diet  e. Offered handout on HTN educational topics.  These were provided if patient requested these today.  f. MEDS: as listed in today's visit.  g. Risks/benefits of current and new medications discussed with the patient and or family today.  The patient/family are aware and accept that if there any side effects they should call or return to clinic  as soon as possible.  Appropriate F/U discussed for topics addressed today. All questions were answered to the  satisfactory state of patient/family.  Should symptoms fail to improve or worsen they agree to call or return to clinic or to go to the ER. Education handouts were offered on any new Rx if requested.  Discussed the importance of following up with any needed screening tests/labs/specialist appointments and any requested follow-up recommended by me today.  Importance of maintaining follow-up discussed and patient accepts that missed appointments can delay diagnosis and potentially lead to worsening of conditions.

## 2019-03-15 ENCOUNTER — TELEPHONE (OUTPATIENT)
Dept: FAMILY MEDICINE CLINIC | Facility: CLINIC | Age: 69
End: 2019-03-15

## 2019-03-15 NOTE — TELEPHONE ENCOUNTER
REQUEST HER DOXEPIN TO BE CALLED IN AND READJUSTED ,   SHE WAS TAKING 25MG AND WAS TOLD TO INCREASE TO 100MG   SO SHE WOULD TAKE 4 A DAY .  [facility]eding [facility]

## 2019-03-16 PROCEDURE — 99219 PR INITIAL OBSERVATION CARE/DAY 50 MINUTES: CPT | Performed by: FAMILY MEDICINE

## 2019-03-18 ENCOUNTER — OUTSIDE FACILITY SERVICE (OUTPATIENT)
Dept: FAMILY MEDICINE CLINIC | Facility: CLINIC | Age: 69
End: 2019-03-18

## 2019-03-19 ENCOUNTER — TELEPHONE (OUTPATIENT)
Dept: FAMILY MEDICINE CLINIC | Facility: CLINIC | Age: 69
End: 2019-03-19

## 2019-03-19 PROCEDURE — 99217 PR OBSERVATION CARE DISCHARGE MANAGEMENT: CPT | Performed by: FAMILY MEDICINE

## 2019-03-19 NOTE — TELEPHONE ENCOUNTER
PT was discharged from [facility], 3/19/19. PT needs 1 week follow up with Dr. Jacobs. Appt is scheduled for 3/26/19 1:00.

## 2019-03-20 ENCOUNTER — TELEPHONE (OUTPATIENT)
Dept: FAMILY MEDICINE CLINIC | Facility: CLINIC | Age: 69
End: 2019-03-20

## 2019-03-20 NOTE — TELEPHONE ENCOUNTER
Dr Jacobs -     She has had 2 episodes since yesterday after being dismissed from hospital yestereday - episodes of numbness and tingling down the left side of her face, and tongue, left arm and hand,,left leg is weak.   She was advised to go to ER, within this conversation , however she wanted Dr Jacobs to know and his advise or opinion.

## 2019-03-20 NOTE — TELEPHONE ENCOUNTER
If she is having symptoms, needs to go to ER or to [facility] ER for eval and neuro eval.

## 2019-03-22 ENCOUNTER — TRANSITIONAL CARE MANAGEMENT TELEPHONE ENCOUNTER (OUTPATIENT)
Dept: FAMILY MEDICINE CLINIC | Facility: CLINIC | Age: 69
End: 2019-03-22

## 2019-03-26 ENCOUNTER — OFFICE VISIT (OUTPATIENT)
Dept: FAMILY MEDICINE CLINIC | Facility: CLINIC | Age: 69
End: 2019-03-26

## 2019-03-26 VITALS
BODY MASS INDEX: 30.82 KG/M2 | OXYGEN SATURATION: 92 % | TEMPERATURE: 97 F | SYSTOLIC BLOOD PRESSURE: 130 MMHG | DIASTOLIC BLOOD PRESSURE: 68 MMHG | WEIGHT: 157 LBS | HEART RATE: 70 BPM | HEIGHT: 60 IN

## 2019-03-26 DIAGNOSIS — J41.8 MIXED SIMPLE AND MUCOPURULENT CHRONIC BRONCHITIS (HCC): ICD-10-CM

## 2019-03-26 DIAGNOSIS — G45.9 TIA (TRANSIENT ISCHEMIC ATTACK): Primary | ICD-10-CM

## 2019-03-26 DIAGNOSIS — F32.9 REACTIVE DEPRESSION: ICD-10-CM

## 2019-03-26 DIAGNOSIS — F41.9 ANXIETY: ICD-10-CM

## 2019-03-26 DIAGNOSIS — I10 ESSENTIAL HYPERTENSION: ICD-10-CM

## 2019-03-26 PROCEDURE — 99496 TRANSJ CARE MGMT HIGH F2F 7D: CPT | Performed by: FAMILY MEDICINE

## 2019-03-26 RX ORDER — CLONIDINE 0.2 MG/24H
2 PATCH, EXTENDED RELEASE TRANSDERMAL WEEKLY
Qty: 8 EACH | Refills: 5
Start: 2019-03-26 | End: 2019-03-29 | Stop reason: SDUPTHER

## 2019-03-26 RX ORDER — AMLODIPINE BESYLATE 10 MG/1
10 TABLET ORAL DAILY
Refills: 5 | COMMUNITY
Start: 2019-03-16 | End: 2019-04-14 | Stop reason: SDUPTHER

## 2019-03-26 RX ORDER — ESCITALOPRAM OXALATE 10 MG/1
10 TABLET ORAL NIGHTLY
Qty: 30 TABLET | Refills: 5 | Status: SHIPPED | OUTPATIENT
Start: 2019-03-26 | End: 2019-04-18 | Stop reason: SDUPTHER

## 2019-03-26 RX ORDER — BACLOFEN 10 MG/1
10 TABLET ORAL DAILY
Refills: 2 | COMMUNITY
Start: 2019-03-19 | End: 2019-07-19 | Stop reason: SDUPTHER

## 2019-03-26 NOTE — PROGRESS NOTES
"OFFICE VISIT NOTE:    Shanell Coffman is a 68 y.o. female who presents today for Hypertension ([facility] follow up).     -140/70-88 at home.       Hypertension          Past medical/surgical history, Family history, Social history, Allergies and Medications have been reviewed with the patient today and are updated in [facility] EMR. See below.    Past Medical History:   Diagnosis Date   • Anxiety    • Chronic pain    • COPD (chronic obstructive pulmonary disease) (CMS/HCC)    • GERD (gastroesophageal reflux disease)    • Hyperlipidemia      Past Surgical History:   Procedure Laterality Date   • APPENDECTOMY     • CHOLECYSTECTOMY     • COLON SURGERY     • TUBAL ABDOMINAL LIGATION       Family History   Problem Relation Age of Onset   • Heart disease Mother    • Hyperlipidemia Mother    • Hypertension Mother    • Cancer Father    • Mental illness Father    • Diabetes Maternal Grandmother    • Heart attack Maternal Grandfather    • Obesity Paternal Grandmother    • Stroke Paternal Grandfather      Social History     Tobacco Use   • Smoking status: Never Smoker   • Smokeless tobacco: Never Used   Substance Use Topics   • Alcohol use: No     Frequency: Never     Binge frequency: Never   • Drug use: No       Allergies:  Ceclor [cefaclor]; Keflex [cephalexin]; Stadol [butorphanol]; and Seroquel [quetiapine]      Review of Systems:    Review of Systems      Physical Examination:  Vital Signs:  /68 (BP Location: Left arm, Patient Position: Sitting, Cuff Size: Adult)   Pulse 70   Temp 97 °F (36.1 °C) (Tympanic)   Ht 152.4 cm (60\")   Wt 71.2 kg (157 lb)   LMP  (LMP Unknown)   SpO2 92% Comment: @ 2 LPM  Breastfeeding? No   BMI 30.66 kg/m²   Physical Exam    Procedures      ASSESSMENT/ PLAN:    Shanell was seen today for hypertension.    Diagnoses and all orders for this visit:    Essential hypertension  -     cloNIDine (CATAPRES-TTS) 0.2 MG/24HR patch; Place 2 patches on the skin as directed by provider 1 (One) Time Per " Week.    Reactive depression  -     escitalopram (LEXAPRO) 10 MG tablet; Take 1 tablet by mouth Every Night.    Mixed simple and mucopurulent chronic bronchitis (CMS/HCC)    Anxiety        Specific Patient Instructions:    MEDICATION Instructions: Encouraged patient to continue routine medicines as prescribed and maintain compliance. Patient instructed to report any adverse side effects or reactions to medicines promptly to the office. Patient instructed to make us aware of any OTC or herbal meds or supplement use.  DIET Recommendations: No new recommendations regarding diet/restrictions.  EXERCISE Instructions: No new recommendations.    SMOKING Recommendations: N/A  HEALTH MAINTENANCE:  N/A  MISCELLANEOUS Instructions: N/A      Medications at completion of visit:      Current Outpatient Medications:   •  albuterol (PROVENTIL) (2.5 MG/3ML) 0.083% nebulizer solution, USE 3 ML PER NEBULIZER 4 TIMES A DAY, Disp: 375 mL, Rfl: 0  •  ALPRAZolam (XANAX) 1 MG tablet, Take 1 tablet by mouth 3 (Three) Times a Day As Needed for Anxiety., Disp: 90 tablet, Rfl: 2  •  amLODIPine (NORVASC) 10 MG tablet, Take 10 mg by mouth Daily., Disp: , Rfl: 5  •  baclofen (LIORESAL) 10 MG tablet, Take 10 mg by mouth Daily., Disp: , Rfl: 2  •  Calcium Carbonate-Vitamin D (OYSTER SHELL CALCIUM 500 + D) 500-125 MG-UNIT tablet, TAKE 1 TABLET BY MOUTH WITH FOOD, Disp: 90 each, Rfl: 2  •  calcium-vitamin D (OSCAL 500/200 D-3) 500-200 MG-UNIT per tablet, Take 1 tablet by mouth Daily., Disp: 30 tablet, Rfl: 11  •  cloNIDine (CATAPRES-TTS) 0.2 MG/24HR patch, Place 2 patches on the skin as directed by provider 1 (One) Time Per Week., Disp: 8 each, Rfl: 5  •  dicyclomine (BENTYL) 20 MG tablet, TAKE 1 TABLET BY MOUTH 4 TIMES A DAY PRB, Disp: , Rfl: 2  •  diltiaZEM (TIAZAC) 360 MG 24 hr capsule, Take 1 capsule by mouth Daily., Disp: 30 capsule, Rfl: 5  •  doxepin (SINEquan) 25 MG capsule, Take 1 capsule by mouth Every Night., Disp: 30 capsule, Rfl: 5  •   ketoconazole (NIZORAL) 2 % cream, Apply  topically to the appropriate area as directed Daily., Disp: 60 g, Rfl: 5  •  losartan (COZAAR) 50 MG tablet, Take 100 mg by mouth Daily., Disp: , Rfl:   •  metoclopramide (REGLAN) 5 MG tablet, TAKE 1 TABLET BY MOUTH 4 TIMES A DAY AS NEEDED, Disp: 120 tablet, Rfl: 5  •  O2 (OXYGEN), Inhale 2 L/min 1 (One) Time., Disp: , Rfl:   •  oxyCODONE-acetaminophen (PERCOCET) 7.5-325 MG per tablet, Take 1 tablet by mouth 4 (Four) Times a Day., Disp: , Rfl: 0  •  pantoprazole (PROTONIX) 40 MG EC tablet, Take 40 mg by mouth Daily., Disp: , Rfl:   •  spironolactone (ALDACTONE) 25 MG tablet, Take 1 tablet by mouth Daily., Disp: 30 tablet, Rfl: 5  •  escitalopram (LEXAPRO) 10 MG tablet, Take 1 tablet by mouth Every Night., Disp: 30 tablet, Rfl: 5  •  furosemide (LASIX) 40 MG tablet, Take 40 mg by mouth Daily., Disp: , Rfl:   •  promethazine (PHENERGAN) 25 MG tablet, TAKE 1 TABLET(S) BY MOUTH DAILY AS NEEDED NAUSEA/VOMITING, Disp: , Rfl: 5  •  terbinafine (LAMISIL) 250 MG tablet, Take 1 tablet by mouth Daily., Disp: 14 tablet, Rfl: 2    FOLLOW-UP:    Return in about 6 weeks (around 5/7/2019) for Recheck.    I discussed the patients findings and my recommendations with patient.  An After Visit Summary (AVS) was printed and given to the patient at discharge.    Reji Jacobs MD, FAAFP  3/26/2019

## 2019-03-26 NOTE — PATIENT INSTRUCTIONS
Suspect Essential HTN.Good BP control is encouraged with Goal BP based on JNC 8 guidelines 2014 <140/90 for patients with known cardiac disease and diabetes. (MARQUIS. 2014:322 (5):507-520. doi:10.1001/marquis.2013.44196): general population <60 yr old goal BP <140/90 and for those >60 <150/90.  For patients of all ages with Diabetes, CKD, Known CAD <140/90. Recommended to the patient to obtain electronic home BP machine with upper arm blood pressure cuff and to check regularly as instructed.  Keep BP log and bring to subsequent visits. Stable, at goal.  a. LABS: routine for hypertension recommended and ordered if necessary.  b. Recommend if you do not have a home BP machine to obtain an electronic machine with arm blood pressure cuff.      c. Monitor BP over the next week and keep log to bring back to office. Discussed medication therapy however pt wants to try to control with diet exercise. .  Your provider  has recommended self-monitoring of your blood pressure.  If you do not have a blood pressure cuff you may purchase one from the local pharmacy.  You may ask the pharmacist which brand and model they recommend.  Obtain your blood pressure measurement at least 2x per week.  You should also check your blood pressure if you experience any symptoms of blurred visit, dizziness or headache.  Please record all blood pressure measurements and bring them to next office visit.  If you have any questions about the accuracy of your blood pressure machine please bring it in to the office and our staff will be happy to check accuracy.   d. Encouraged to eat a low sodium heart healthy diet  e. Offered handout on HTN educational topics.  These were provided if patient requested these today.  f. MEDS: as listed in today's visit.  g. Risks/benefits of current and new medications discussed with the patient and or family today.  The patient/family are aware and accept that if there any side effects they should call or return to clinic  as soon as possible.  Appropriate F/U discussed for topics addressed today. All questions were answered to the  satisfactory state of patient/family.  Should symptoms fail to improve or worsen they agree to call or return to clinic or to go to the ER. Education handouts were offered on any new Rx if requested.  Discussed the importance of following up with any needed screening tests/labs/specialist appointments and any requested follow-up recommended by me today.  Importance of maintaining follow-up discussed and patient accepts that missed appointments can delay diagnosis and potentially lead to worsening of conditions.

## 2019-03-26 NOTE — PROGRESS NOTES
[facility]  TRANSITIONAL CARE MANAGEMENT VISIT         DISCHARGING PHYSICIAN: FRENCH  DISCHARGE DATE: 3/19/19  DISCHARGE from FACILITY: [facility]    DISCHARGE DIAGNOSES:  TIA; anxiety; hypertension, uncontrolled.    INITIAL TCM Phone Encounter was made on 3/16/19.    FACE-to-FACE evaluation performed on 03/26/2019.  This was within within 7 (moderate) days of discharge.    MEDICATION RECONCILIATION: Medication list was reviewed and reconciled, and new list provided to patient/family/caregiver via AVS.    ALLERGIES:  Ceclor [cefaclor]; Keflex [cephalexin]; Stadol [butorphanol]; and Seroquel [quetiapine]    CURRENT MEDICATION LIST:    Current Outpatient Medications:   •  albuterol (PROVENTIL) (2.5 MG/3ML) 0.083% nebulizer solution, USE 3 ML PER NEBULIZER 4 TIMES A DAY, Disp: 375 mL, Rfl: 0  •  ALPRAZolam (XANAX) 1 MG tablet, Take 1 tablet by mouth 3 (Three) Times a Day As Needed for Anxiety., Disp: 90 tablet, Rfl: 2  •  amLODIPine (NORVASC) 10 MG tablet, Take 10 mg by mouth Daily., Disp: , Rfl: 5  •  baclofen (LIORESAL) 10 MG tablet, Take 10 mg by mouth Daily., Disp: , Rfl: 2  •  Calcium Carbonate-Vitamin D (OYSTER SHELL CALCIUM 500 + D) 500-125 MG-UNIT tablet, TAKE 1 TABLET BY MOUTH WITH FOOD, Disp: 90 each, Rfl: 2  •  calcium-vitamin D (OSCAL 500/200 D-3) 500-200 MG-UNIT per tablet, Take 1 tablet by mouth Daily., Disp: 30 tablet, Rfl: 11  •  cloNIDine (CATAPRES-TTS) 0.2 MG/24HR patch, Place 2 patches on the skin as directed by provider 1 (One) Time Per Week., Disp: 8 each, Rfl: 5  •  dicyclomine (BENTYL) 20 MG tablet, TAKE 1 TABLET BY MOUTH 4 TIMES A DAY PRB, Disp: , Rfl: 2  •  diltiaZEM (TIAZAC) 360 MG 24 hr capsule, Take 1 capsule by mouth Daily., Disp: 30 capsule, Rfl: 5  •  doxepin (SINEquan) 25 MG capsule, Take 1 capsule by mouth Every Night., Disp: 30 capsule, Rfl: 5  •  ketoconazole (NIZORAL) 2 % cream, Apply  topically to the appropriate area as  "directed Daily., Disp: 60 g, Rfl: 5  •  losartan (COZAAR) 50 MG tablet, Take 100 mg by mouth Daily., Disp: , Rfl:   •  metoclopramide (REGLAN) 5 MG tablet, TAKE 1 TABLET BY MOUTH 4 TIMES A DAY AS NEEDED, Disp: 120 tablet, Rfl: 5  •  O2 (OXYGEN), Inhale 2 L/min 1 (One) Time., Disp: , Rfl:   •  oxyCODONE-acetaminophen (PERCOCET) 7.5-325 MG per tablet, Take 1 tablet by mouth 4 (Four) Times a Day., Disp: , Rfl: 0  •  pantoprazole (PROTONIX) 40 MG EC tablet, Take 40 mg by mouth Daily., Disp: , Rfl:   •  spironolactone (ALDACTONE) 25 MG tablet, Take 1 tablet by mouth Daily., Disp: 30 tablet, Rfl: 5  •  escitalopram (LEXAPRO) 10 MG tablet, Take 1 tablet by mouth Every Night., Disp: 30 tablet, Rfl: 5  •  furosemide (LASIX) 40 MG tablet, Take 40 mg by mouth Daily., Disp: , Rfl:   •  promethazine (PHENERGAN) 25 MG tablet, TAKE 1 TABLET(S) BY MOUTH DAILY AS NEEDED NAUSEA/VOMITING, Disp: , Rfl: 5  •  terbinafine (LAMISIL) 250 MG tablet, Take 1 tablet by mouth Daily., Disp: 14 tablet, Rfl: 2    ROS:  Review of Systems   Constitutional: Negative for activity change, fatigue, fever, unexpected weight gain and unexpected weight loss.   Respiratory: Negative for shortness of breath.    Cardiovascular: Negative for chest pain.   Gastrointestinal: Negative for abdominal pain.   Genitourinary: Negative for difficulty urinating.   Skin: Negative for rash.   Neurological: Negative for syncope and headache.   Psychiatric/Behavioral: The patient is nervous/anxious.        PATIENT EXAM:  Vital Signs:  /68 (BP Location: Left arm, Patient Position: Sitting, Cuff Size: Adult)   Pulse 70   Temp 97 °F (36.1 °C) (Tympanic)   Ht 152.4 cm (60\")   Wt 71.2 kg (157 lb)   LMP  (LMP Unknown)   SpO2 92% Comment: @ 2 LPM  Breastfeeding? No   BMI 30.66 kg/m²   Physical Exam   Constitutional: She is oriented to person, place, and time. She appears well-developed and well-nourished. No distress.   HENT:   Head: Normocephalic and atraumatic. "   Mouth/Throat: Oropharynx is clear and moist.   Neck: Normal range of motion. Neck supple. No JVD present.   Cardiovascular: Normal rate, regular rhythm, normal heart sounds and intact distal pulses.   Pulmonary/Chest: Effort normal. No respiratory distress. She has wheezes.   Musculoskeletal: Normal range of motion. She exhibits no edema.   Neurological: She is alert and oriented to person, place, and time. No cranial nerve deficit.   Skin: Skin is warm and dry. Capillary refill takes less than 2 seconds. No rash noted.   Psychiatric: She has a normal mood and affect. Her behavior is normal.   Nursing note and vitals reviewed.     Shanell was seen today for hypertension.    Diagnoses and all orders for this visit:    TIA (transient ischemic attack)    Essential hypertension  -     cloNIDine (CATAPRES-TTS) 0.2 MG/24HR patch; Place 2 patches on the skin as directed by provider 1 (One) Time Per Week.    Reactive depression  -     escitalopram (LEXAPRO) 10 MG tablet; Take 1 tablet by mouth Every Night.    Mixed simple and mucopurulent chronic bronchitis (CMS/HCC)    Anxiety      REFERRALS:  None needed       LABS and/or ADDITIONAL TESTS NEEDED:  No orders of the defined types were placed in this encounter.      DURABLE MEDICAL EQUIPMENT (DME):  Oxygen, portable or concentrator    COMMUNITY RESOURCES IDENTIFIED FOR PATIENT/FAMILY:  None Needed.    ADDITIONAL COMMUNICATION DELIVERED OR PLANNED:  Please refer to AVS for specifics of Patient Education, Updated Medication List, Handouts, Referrals. As necessary, information was made available or sent to Specialists or members of Care Team.    COMPLEXITY OF MEDICAL DECISION MAKING:  High Complexity (74738)  (Considerations: Number of diagnoses, number of management options considered, amount and/or complexity of medical records/diagnostic tests, and/or other information obtained/reviewed/analyzed. Risk of significant complications, morbidity, and/or mortality as well as  co-morbidities associated with presenting problems, diagnostic procedures, and/or the possible management options).

## 2019-03-27 NOTE — TELEPHONE ENCOUNTER
Patients pharmacy requested 90 day supply on quetiapine fumarate 100 mg QD HS.    Pended medication but there is a flag that the patient has itching with this medication. Is the patient supposed to continue this?

## 2019-03-28 RX ORDER — QUETIAPINE FUMARATE 100 MG/1
100 TABLET, FILM COATED ORAL NIGHTLY
Qty: 90 TABLET | Refills: 1 | Status: SHIPPED | OUTPATIENT
Start: 2019-03-28 | End: 2019-04-19 | Stop reason: ALTCHOICE

## 2019-03-29 DIAGNOSIS — I10 ESSENTIAL HYPERTENSION: ICD-10-CM

## 2019-03-29 RX ORDER — CLONIDINE 0.2 MG/24H
2 PATCH, EXTENDED RELEASE TRANSDERMAL WEEKLY
Qty: 8 EACH | Refills: 5 | Status: SHIPPED | OUTPATIENT
Start: 2019-03-29 | End: 2019-04-17 | Stop reason: SDUPTHER

## 2019-04-10 ENCOUNTER — TELEPHONE (OUTPATIENT)
Dept: FAMILY MEDICINE CLINIC | Facility: CLINIC | Age: 69
End: 2019-04-10

## 2019-04-10 NOTE — TELEPHONE ENCOUNTER
Patient called because she received a letter from [employer]. Her clonidine patches are no longer going to be covered. She is needing a prior authorization.

## 2019-04-12 ENCOUNTER — TELEPHONE (OUTPATIENT)
Dept: FAMILY MEDICINE CLINIC | Facility: CLINIC | Age: 69
End: 2019-04-12

## 2019-04-12 DIAGNOSIS — I10 ESSENTIAL HYPERTENSION: ICD-10-CM

## 2019-04-12 DIAGNOSIS — B37.0 THRUSH: Primary | ICD-10-CM

## 2019-04-15 ENCOUNTER — TELEPHONE (OUTPATIENT)
Dept: FAMILY MEDICINE CLINIC | Facility: CLINIC | Age: 69
End: 2019-04-15

## 2019-04-15 NOTE — TELEPHONE ENCOUNTER
The response stated they could not find a matching patient.  I am going to call the support number Ernesto provided to me.

## 2019-04-15 NOTE — TELEPHONE ENCOUNTER
Spoke to Haley at [employer] they are unable to locate patient.  I have printed off PA request form per Haley to fill out and fax to [employer].  Will complete and fax ASAP

## 2019-04-15 NOTE — TELEPHONE ENCOUNTER
Patient called because her blood pressure was 244/122 this morning. Patient was wanting an appointment in office. Dr. Jacobs said that to let the patient know that she needs to go to the ER with a blood pressure that high and that they can call him from there. Patient verbalized understanding and said that she is going to go to the ER.

## 2019-04-16 RX ORDER — AMLODIPINE BESYLATE 10 MG/1
TABLET ORAL
Qty: 90 TABLET | Refills: 1 | Status: SHIPPED | OUTPATIENT
Start: 2019-04-16 | End: 2019-07-19 | Stop reason: SDUPTHER

## 2019-04-17 DIAGNOSIS — I10 ESSENTIAL HYPERTENSION: ICD-10-CM

## 2019-04-18 DIAGNOSIS — F32.9 REACTIVE DEPRESSION: ICD-10-CM

## 2019-04-18 RX ORDER — CLONIDINE 0.2 MG/24H
2 PATCH, EXTENDED RELEASE TRANSDERMAL WEEKLY
Qty: 8 EACH | Refills: 5 | Status: SHIPPED | OUTPATIENT
Start: 2019-04-18 | End: 2019-04-19 | Stop reason: ALTCHOICE

## 2019-04-18 RX ORDER — ESCITALOPRAM OXALATE 10 MG/1
10 TABLET ORAL NIGHTLY
Qty: 90 TABLET | Refills: 3 | Status: SHIPPED | OUTPATIENT
Start: 2019-04-18 | End: 2019-04-19 | Stop reason: SDUPTHER

## 2019-04-18 NOTE — TELEPHONE ENCOUNTER
Rec'd fax from [facility] requesting refill of Escitalopram 10 mg tablet. 90 day supply also requesting, take 1 tablet by mouth every day at night.

## 2019-04-19 ENCOUNTER — OFFICE VISIT (OUTPATIENT)
Dept: FAMILY MEDICINE CLINIC | Facility: CLINIC | Age: 69
End: 2019-04-19

## 2019-04-19 VITALS
DIASTOLIC BLOOD PRESSURE: 62 MMHG | TEMPERATURE: 98.5 F | BODY MASS INDEX: 30.82 KG/M2 | HEIGHT: 60 IN | HEART RATE: 88 BPM | OXYGEN SATURATION: 92 % | WEIGHT: 157 LBS | SYSTOLIC BLOOD PRESSURE: 160 MMHG

## 2019-04-19 DIAGNOSIS — J43.1 PANLOBULAR EMPHYSEMA (HCC): ICD-10-CM

## 2019-04-19 DIAGNOSIS — F51.01 PRIMARY INSOMNIA: ICD-10-CM

## 2019-04-19 DIAGNOSIS — I10 ESSENTIAL HYPERTENSION: Primary | ICD-10-CM

## 2019-04-19 DIAGNOSIS — F32.9 REACTIVE DEPRESSION: ICD-10-CM

## 2019-04-19 PROCEDURE — 99214 OFFICE O/P EST MOD 30 MIN: CPT | Performed by: FAMILY MEDICINE

## 2019-04-19 RX ORDER — CLONIDINE HYDROCHLORIDE 0.2 MG/1
0.2 TABLET ORAL 2 TIMES DAILY
Qty: 60 TABLET | Refills: 5 | Status: SHIPPED | OUTPATIENT
Start: 2019-04-22 | End: 2019-10-08 | Stop reason: SDUPTHER

## 2019-04-19 RX ORDER — DOXEPIN HYDROCHLORIDE 50 MG/1
50 CAPSULE ORAL NIGHTLY
Qty: 30 CAPSULE | Refills: 5 | Status: SHIPPED | OUTPATIENT
Start: 2019-04-19 | End: 2019-10-18 | Stop reason: SDUPTHER

## 2019-04-19 RX ORDER — ESCITALOPRAM OXALATE 20 MG/1
20 TABLET ORAL NIGHTLY
Qty: 30 TABLET | Refills: 5 | Status: SHIPPED | OUTPATIENT
Start: 2019-04-19 | End: 2019-07-19 | Stop reason: SDUPTHER

## 2019-04-19 RX ORDER — ALBUTEROL SULFATE 90 UG/1
2 AEROSOL, METERED RESPIRATORY (INHALATION) EVERY 4 HOURS PRN
Qty: 18 G | Refills: 5 | Status: SHIPPED | OUTPATIENT
Start: 2019-04-19 | End: 2019-10-07 | Stop reason: SDUPTHER

## 2019-04-19 RX ORDER — MIRTAZAPINE 15 MG/1
15 TABLET, FILM COATED ORAL
Refills: 3 | COMMUNITY
Start: 2019-04-01 | End: 2020-02-06

## 2019-04-19 NOTE — PROGRESS NOTES
OFFICE VISIT NOTE:    Shanell Coffman is a 68 y.o. female who presents today for Hypertension and Numbness (left side , radiating down L arm, difficulty speaking).     BP at home running 210-240/100's on occasion - and was symptomatic with facial numbness and left arm/leg radiation. Only getting 2-3 hours sleep per night. On Lexapro low dose - may consider adjustment of dose.     Catapres patches are not covered by insurance without PA, and prior nephrologist recommended a low-dose B-blocker for her - will consider this after changes made today.       Hypertension   This is a chronic problem. The current episode started more than 1 year ago. The problem is unchanged. The problem is uncontrolled. Associated symptoms include malaise/fatigue. Pertinent negatives include no chest pain, headaches, orthopnea, palpitations, peripheral edema, PND or shortness of breath. There are no associated agents to hypertension. Risk factors for coronary artery disease include dyslipidemia, family history, obesity, post-menopausal state, sedentary lifestyle and stress. Past treatments include diuretics, alpha 1 blockers, angiotensin blockers and calcium channel blockers. Current antihypertension treatment includes diuretics, calcium channel blockers, alpha 1 blockers and angiotensin blockers. The current treatment provides significant improvement. There are no compliance problems.         Past medical/surgical history, Family history, Social history, Allergies and Medications have been reviewed with the patient today and are updated in [facility] EMR. See below.    Past Medical History:   Diagnosis Date   • Anxiety    • Chronic pain    • COPD (chronic obstructive pulmonary disease) (CMS/HCC)    • GERD (gastroesophageal reflux disease)    • Hyperlipidemia      Past Surgical History:   Procedure Laterality Date   • APPENDECTOMY     • CHOLECYSTECTOMY     • COLON SURGERY     • TUBAL ABDOMINAL LIGATION       Family History   Problem Relation Age of  Onset   • Heart disease Mother    • Hyperlipidemia Mother    • Hypertension Mother    • Cancer Father    • Mental illness Father    • Diabetes Maternal Grandmother    • Heart attack Maternal Grandfather    • Obesity Paternal Grandmother    • Stroke Paternal Grandfather      Social History     Tobacco Use   • Smoking status: Never Smoker   • Smokeless tobacco: Never Used   Substance Use Topics   • Alcohol use: No     Frequency: Never     Binge frequency: Never   • Drug use: No       Allergies:  Ceclor [cefaclor]; Keflex [cephalexin]; Stadol [butorphanol]; and Seroquel [quetiapine]    Current Meds:    Current Outpatient Medications:   •  albuterol (PROVENTIL) (2.5 MG/3ML) 0.083% nebulizer solution, USE 3 ML PER NEBULIZER 4 TIMES A DAY, Disp: 375 mL, Rfl: 0  •  albuterol sulfate  (90 Base) MCG/ACT inhaler, Inhale 2 puffs Every 4 (Four) Hours As Needed for Wheezing or Shortness of Air., Disp: 18 g, Rfl: 5  •  ALPRAZolam (XANAX) 1 MG tablet, Take 1 tablet by mouth 3 (Three) Times a Day As Needed for Anxiety., Disp: 90 tablet, Rfl: 2  •  amLODIPine (NORVASC) 10 MG tablet, TAKE 1 TABLET BY MOUTH EVERY DAY, Disp: 90 tablet, Rfl: 1  •  baclofen (LIORESAL) 10 MG tablet, Take 10 mg by mouth Daily., Disp: , Rfl: 2  •  Calcium Carbonate-Vitamin D (OYSTER SHELL CALCIUM 500 + D) 500-125 MG-UNIT tablet, TAKE 1 TABLET BY MOUTH WITH FOOD, Disp: 90 each, Rfl: 2  •  calcium-vitamin D (OSCAL 500/200 D-3) 500-200 MG-UNIT per tablet, Take 1 tablet by mouth Daily., Disp: 30 tablet, Rfl: 11  •  [START ON 4/22/2019] CloNIDine (CATAPRES) 0.2 MG tablet, Take 1 tablet by mouth 2 (Two) Times a Day. Begin evening of 4/22/19, and DC patches, Disp: 60 tablet, Rfl: 5  •  dicyclomine (BENTYL) 20 MG tablet, TAKE 1 TABLET BY MOUTH 4 TIMES A DAY PRB, Disp: , Rfl: 2  •  diltiaZEM (TIAZAC) 360 MG 24 hr capsule, Take 1 capsule by mouth Daily., Disp: 30 capsule, Rfl: 5  •  doxepin (SINEquan) 50 MG capsule, Take 1 capsule by mouth Every Night., Disp:  30 capsule, Rfl: 5  •  escitalopram (LEXAPRO) 20 MG tablet, Take 1 tablet by mouth Every Night., Disp: 30 tablet, Rfl: 5  •  furosemide (LASIX) 40 MG tablet, Take 40 mg by mouth Daily., Disp: , Rfl:   •  ketoconazole (NIZORAL) 2 % cream, Apply  topically to the appropriate area as directed Daily., Disp: 60 g, Rfl: 5  •  losartan (COZAAR) 50 MG tablet, Take 100 mg by mouth Daily., Disp: , Rfl:   •  metoclopramide (REGLAN) 5 MG tablet, TAKE 1 TABLET BY MOUTH 4 TIMES A DAY AS NEEDED, Disp: 120 tablet, Rfl: 5  •  mirtazapine (REMERON) 15 MG tablet, Take 15 mg by mouth every night at bedtime., Disp: , Rfl: 3  •  nystatin (MYCOSTATIN) 900822 UNIT/ML suspension, Swish and swallow 5 mL 4 (Four) Times a Day., Disp: 240 mL, Rfl: 0  •  O2 (OXYGEN), Inhale 2 L/min 1 (One) Time., Disp: , Rfl:   •  oxyCODONE-acetaminophen (PERCOCET) 7.5-325 MG per tablet, Take 1 tablet by mouth 4 (Four) Times a Day., Disp: , Rfl: 0  •  pantoprazole (PROTONIX) 40 MG EC tablet, Take 40 mg by mouth Daily., Disp: , Rfl:   •  promethazine (PHENERGAN) 25 MG tablet, TAKE 1 TABLET(S) BY MOUTH DAILY AS NEEDED NAUSEA/VOMITING, Disp: , Rfl: 5  •  spironolactone (ALDACTONE) 25 MG tablet, Take 1 tablet by mouth Daily., Disp: 30 tablet, Rfl: 5  •  terbinafine (LAMISIL) 250 MG tablet, Take 1 tablet by mouth Daily., Disp: 14 tablet, Rfl: 2    Review of Systems:  Review of Systems   Constitutional: Positive for malaise/fatigue. Negative for activity change, fatigue, fever, unexpected weight gain and unexpected weight loss.   Respiratory: Negative for shortness of breath.    Cardiovascular: Negative for chest pain, palpitations, orthopnea and PND.   Gastrointestinal: Negative for abdominal pain.   Genitourinary: Negative for difficulty urinating.   Skin: Negative for rash.   Neurological: Negative for syncope and headache.       Physical Examination:  Vital Signs:  /62 (BP Location: Left arm, Patient Position: Sitting, Cuff Size: Adult)   Pulse 88   Temp  "98.5 °F (36.9 °C) (Tympanic)   Ht 152.4 cm (60\")   Wt 71.2 kg (157 lb)   LMP  (LMP Unknown)   SpO2 92% Comment: on 2 LPM  Breastfeeding? No   BMI 30.66 kg/m²   Physical Exam   Constitutional: She is oriented to person, place, and time. She appears well-developed and well-nourished. No distress.   HENT:   Head: Normocephalic and atraumatic.   Mouth/Throat: Oropharynx is clear and moist.   Neck: Normal range of motion. Neck supple. No JVD present.   Cardiovascular: Normal rate, regular rhythm, normal heart sounds and intact distal pulses.   Pulmonary/Chest: Effort normal and breath sounds normal. No respiratory distress.   Musculoskeletal: Normal range of motion. She exhibits no edema.   Neurological: She is alert and oriented to person, place, and time. No cranial nerve deficit.   Skin: Skin is warm and dry. Capillary refill takes less than 2 seconds. No rash noted.   Psychiatric: She has a normal mood and affect. Her behavior is normal.   Nursing note and vitals reviewed.      Procedures    ASSESSMENT/ PLAN:        Problem List Items Addressed This Visit        Cardiovascular and Mediastinum    Essential hypertension - Primary    Relevant Medications    CloNIDine (CATAPRES) 0.2 MG tablet (Start on 4/22/2019)       Other    Reactive depression    Relevant Medications    mirtazapine (REMERON) 15 MG tablet    escitalopram (LEXAPRO) 20 MG tablet    doxepin (SINEquan) 50 MG capsule      Other Visit Diagnoses     Primary insomnia        Relevant Medications    doxepin (SINEquan) 50 MG capsule    Panlobular emphysema (CMS/HCC)        Relevant Medications    albuterol sulfate  (90 Base) MCG/ACT inhaler                   Specific Patient Instructions:  MEDICATION Instructions: Encouraged patient to continue routine medicines as prescribed and maintain compliance. Patient instructed to report any adverse side effects or reactions to medicines promptly to the office. Patient instructed to make us aware of any OTC " or herbal meds or supplement use.  DIET Recommendations: No new recommendations regarding diet/restrictions.  EXERCISE Instructions: No new recommendations.    SMOKING Recommendations: N/A  HEALTH MAINTENANCE:  N/A  MISCELLANEOUS Instructions: N/A      Medications ordered or changed this visit:  New Medications Ordered This Visit   Medications   • escitalopram (LEXAPRO) 20 MG tablet     Sig: Take 1 tablet by mouth Every Night.     Dispense:  30 tablet     Refill:  5   • doxepin (SINEquan) 50 MG capsule     Sig: Take 1 capsule by mouth Every Night.     Dispense:  30 capsule     Refill:  5   • albuterol sulfate  (90 Base) MCG/ACT inhaler     Sig: Inhale 2 puffs Every 4 (Four) Hours As Needed for Wheezing or Shortness of Air.     Dispense:  18 g     Refill:  5     Please use Ventolin, as Proventil and generic albuterol inhalers cause jitteriness and anxiety/shakes.   • CloNIDine (CATAPRES) 0.2 MG tablet     Sig: Take 1 tablet by mouth 2 (Two) Times a Day. Begin evening of 4/22/19, and DC patches     Dispense:  60 tablet     Refill:  5        FOLLOW-UP:  Return in about 6 weeks (around 5/31/2019) for Recheck.    I discussed the patients findings and my recommendations with patient.  An After Visit Summary (AVS) was printed and given to the patient at discharge.      Reji Jacobs MD, FAAFP  4/21/2019

## 2019-04-19 NOTE — PATIENT INSTRUCTIONS
Suspect Essential HTN.Good BP control is encouraged with Goal BP based on JNC 8 guidelines 2014 <140/90 for patients with known cardiac disease and diabetes. (MARQUIS. 2014:322 (5):507-520. doi:10.1001/marquis.2013.91662): general population <60 yr old goal BP <140/90 and for those >60 <150/90.  For patients of all ages with Diabetes, CKD, Known CAD <140/90. Recommended to the patient to obtain electronic home BP machine with upper arm blood pressure cuff and to check regularly as instructed.  Keep BP log and bring to subsequent visits. Stable, at goal.  a. LABS: routine for hypertension recommended and ordered if necessary.  b. Recommend if you do not have a home BP machine to obtain an electronic machine with arm blood pressure cuff.      c. Monitor BP over the next week and keep log to bring back to office. Discussed medication therapy however pt wants to try to control with diet exercise. .  Your provider  has recommended self-monitoring of your blood pressure.  If you do not have a blood pressure cuff you may purchase one from the local pharmacy.  You may ask the pharmacist which brand and model they recommend.  Obtain your blood pressure measurement at least 2x per week.  You should also check your blood pressure if you experience any symptoms of blurred visit, dizziness or headache.  Please record all blood pressure measurements and bring them to next office visit.  If you have any questions about the accuracy of your blood pressure machine please bring it in to the office and our staff will be happy to check accuracy.   d. Encouraged to eat a low sodium heart healthy diet  e. Offered handout on HTN educational topics.  These were provided if patient requested these today.  f. MEDS: as listed in today's visit.  g. Risks/benefits of current and new medications discussed with the patient and or family today.  The patient/family are aware and accept that if there any side effects they should call or return to clinic  as soon as possible.  Appropriate F/U discussed for topics addressed today. All questions were answered to the  satisfactory state of patient/family.  Should symptoms fail to improve or worsen they agree to call or return to clinic or to go to the ER. Education handouts were offered on any new Rx if requested.  Discussed the importance of following up with any needed screening tests/labs/specialist appointments and any requested follow-up recommended by me today.  Importance of maintaining follow-up discussed and patient accepts that missed appointments can delay diagnosis and potentially lead to worsening of conditions.

## 2019-04-23 DIAGNOSIS — B37.0 THRUSH: ICD-10-CM

## 2019-04-23 NOTE — TELEPHONE ENCOUNTER
Pt called regarding patch that needed PA. Pt stated she rec'd letter from insurance company stating that patch was approved. At this time, Pt is using pill form. She is questioning which to use, patch or pill? Pt also stated that medication was given for thrush, however contents spilled out in vehicle. She is requesting refill of this medication to be sent to [facility].

## 2019-05-01 ENCOUNTER — OFFICE VISIT (OUTPATIENT)
Dept: FAMILY MEDICINE CLINIC | Facility: CLINIC | Age: 69
End: 2019-05-01

## 2019-05-01 VITALS
BODY MASS INDEX: 31.39 KG/M2 | DIASTOLIC BLOOD PRESSURE: 82 MMHG | OXYGEN SATURATION: 93 % | HEART RATE: 71 BPM | TEMPERATURE: 97.3 F | HEIGHT: 60 IN | WEIGHT: 159.9 LBS | SYSTOLIC BLOOD PRESSURE: 178 MMHG

## 2019-05-01 DIAGNOSIS — I10 ESSENTIAL HYPERTENSION: Primary | ICD-10-CM

## 2019-05-01 DIAGNOSIS — F41.9 ANXIETY: ICD-10-CM

## 2019-05-01 PROCEDURE — 99213 OFFICE O/P EST LOW 20 MIN: CPT | Performed by: FAMILY MEDICINE

## 2019-05-01 RX ORDER — METOPROLOL SUCCINATE 25 MG/1
25 TABLET, EXTENDED RELEASE ORAL DAILY
Qty: 30 TABLET | Refills: 5 | Status: SHIPPED | OUTPATIENT
Start: 2019-05-01 | End: 2020-08-10 | Stop reason: SDUPTHER

## 2019-05-01 NOTE — PROGRESS NOTES
"OFFICE VISIT NOTE:    Shanell Coffman is a 68 y.o. female who presents today for Hypertension (follow up).     /125 in office yesterday in [place] - they insisted on her following up today for BP. No more episodes of the \"spells\" with the elevated BP - less anxiety, better sleep, no nightmares. BP tends to want to creep up in the late afternoons.       Hypertension   This is a chronic problem. The current episode started more than 1 year ago. The problem is unchanged. The problem is uncontrolled. Associated symptoms include headaches and malaise/fatigue. Pertinent negatives include no chest pain, palpitations, peripheral edema, PND or shortness of breath. There are no associated agents to hypertension. Risk factors for coronary artery disease include family history, obesity, post-menopausal state, stress and sedentary lifestyle. Past treatments include alpha 1 blockers, calcium channel blockers, beta blockers, diuretics and angiotensin blockers. Current antihypertension treatment includes angiotensin blockers, alpha 1 blockers, calcium channel blockers, beta blockers and diuretics. The current treatment provides moderate improvement. There are no compliance problems.  Hypertensive end-organ damage includes CVA.        Past medical/surgical history, Family history, Social history, Allergies and Medications have been reviewed with the patient today and are updated in [facility] EMR. See below.    Past Medical History:   Diagnosis Date   • Anxiety    • Chronic pain    • COPD (chronic obstructive pulmonary disease) (CMS/HCC)    • GERD (gastroesophageal reflux disease)    • Hyperlipidemia      Past Surgical History:   Procedure Laterality Date   • APPENDECTOMY     • CHOLECYSTECTOMY     • COLON SURGERY     • TUBAL ABDOMINAL LIGATION       Family History   Problem Relation Age of Onset   • Heart disease Mother    • Hyperlipidemia Mother    • Hypertension Mother    • Cancer Father    • Mental illness Father    • Diabetes " Maternal Grandmother    • Heart attack Maternal Grandfather    • Obesity Paternal Grandmother    • Stroke Paternal Grandfather      Social History     Tobacco Use   • Smoking status: Never Smoker   • Smokeless tobacco: Never Used   Substance Use Topics   • Alcohol use: No     Frequency: Never     Binge frequency: Never   • Drug use: No       Allergies:  Ceclor [cefaclor]; Keflex [cephalexin]; Stadol [butorphanol]; and Seroquel [quetiapine]    Current Meds:    Current Outpatient Medications:   •  albuterol (PROVENTIL) (2.5 MG/3ML) 0.083% nebulizer solution, USE 3 ML PER NEBULIZER 4 TIMES A DAY, Disp: 375 mL, Rfl: 0  •  albuterol sulfate  (90 Base) MCG/ACT inhaler, Inhale 2 puffs Every 4 (Four) Hours As Needed for Wheezing or Shortness of Air., Disp: 18 g, Rfl: 5  •  ALPRAZolam (XANAX) 1 MG tablet, Take 1 tablet by mouth 3 (Three) Times a Day As Needed for Anxiety., Disp: 90 tablet, Rfl: 2  •  amLODIPine (NORVASC) 10 MG tablet, TAKE 1 TABLET BY MOUTH EVERY DAY, Disp: 90 tablet, Rfl: 1  •  baclofen (LIORESAL) 10 MG tablet, Take 10 mg by mouth Daily., Disp: , Rfl: 2  •  Calcium Carbonate-Vitamin D (OYSTER SHELL CALCIUM 500 + D) 500-125 MG-UNIT tablet, TAKE 1 TABLET BY MOUTH WITH FOOD, Disp: 90 each, Rfl: 2  •  calcium-vitamin D (OSCAL 500/200 D-3) 500-200 MG-UNIT per tablet, Take 1 tablet by mouth Daily., Disp: 30 tablet, Rfl: 11  •  CloNIDine (CATAPRES) 0.2 MG tablet, Take 1 tablet by mouth 2 (Two) Times a Day. Begin evening of 4/22/19, and DC patches, Disp: 60 tablet, Rfl: 5  •  dicyclomine (BENTYL) 20 MG tablet, TAKE 1 TABLET BY MOUTH 4 TIMES A DAY PRB, Disp: , Rfl: 2  •  diltiaZEM (TIAZAC) 360 MG 24 hr capsule, Take 1 capsule by mouth Daily., Disp: 30 capsule, Rfl: 5  •  doxepin (SINEquan) 50 MG capsule, Take 1 capsule by mouth Every Night., Disp: 30 capsule, Rfl: 5  •  escitalopram (LEXAPRO) 20 MG tablet, Take 1 tablet by mouth Every Night., Disp: 30 tablet, Rfl: 5  •  furosemide (LASIX) 40 MG tablet, Take  "40 mg by mouth Daily., Disp: , Rfl:   •  ketoconazole (NIZORAL) 2 % cream, Apply  topically to the appropriate area as directed Daily., Disp: 60 g, Rfl: 5  •  losartan (COZAAR) 50 MG tablet, Take 100 mg by mouth Daily., Disp: , Rfl:   •  metoclopramide (REGLAN) 5 MG tablet, TAKE 1 TABLET BY MOUTH 4 TIMES A DAY AS NEEDED, Disp: 120 tablet, Rfl: 5  •  mirtazapine (REMERON) 15 MG tablet, Take 15 mg by mouth every night at bedtime., Disp: , Rfl: 3  •  O2 (OXYGEN), Inhale 2 L/min 1 (One) Time., Disp: , Rfl:   •  oxyCODONE-acetaminophen (PERCOCET) 7.5-325 MG per tablet, Take 1 tablet by mouth 4 (Four) Times a Day., Disp: , Rfl: 0  •  pantoprazole (PROTONIX) 40 MG EC tablet, Take 40 mg by mouth Daily., Disp: , Rfl:   •  promethazine (PHENERGAN) 25 MG tablet, TAKE 1 TABLET(S) BY MOUTH DAILY AS NEEDED NAUSEA/VOMITING, Disp: , Rfl: 5  •  spironolactone (ALDACTONE) 25 MG tablet, Take 1 tablet by mouth Daily., Disp: 30 tablet, Rfl: 5  •  terbinafine (LAMISIL) 250 MG tablet, Take 1 tablet by mouth Daily., Disp: 14 tablet, Rfl: 2  •  metoprolol succinate XL (TOPROL-XL) 25 MG 24 hr tablet, Take 1 tablet by mouth Daily., Disp: 30 tablet, Rfl: 5    Review of Systems:  Review of Systems   Constitutional: Positive for malaise/fatigue. Negative for activity change, fatigue, fever, unexpected weight gain and unexpected weight loss.   Respiratory: Negative for shortness of breath.    Cardiovascular: Negative for chest pain, palpitations and PND.   Gastrointestinal: Negative for abdominal pain.   Genitourinary: Negative for difficulty urinating.   Skin: Negative for rash.   Neurological: Negative for syncope and headache.       Physical Examination:  Vital Signs:  /82 (BP Location: Left arm, Patient Position: Sitting, Cuff Size: Adult)   Pulse 71   Temp 97.3 °F (36.3 °C) (Tympanic)   Ht 152.4 cm (60\")   Wt 72.5 kg (159 lb 14.4 oz)   LMP  (LMP Unknown)   SpO2 93%   Breastfeeding? No   BMI 31.23 kg/m²   Physical Exam "   Constitutional: She is oriented to person, place, and time. She appears well-developed and well-nourished. No distress.   HENT:   Head: Normocephalic and atraumatic.   Mouth/Throat: Oropharynx is clear and moist.   Neck: Normal range of motion. Neck supple. No JVD present.   Cardiovascular: Normal rate, regular rhythm, normal heart sounds and intact distal pulses.   Pulmonary/Chest: Effort normal and breath sounds normal. No respiratory distress.   Musculoskeletal: Normal range of motion. She exhibits no edema.   Neurological: She is alert and oriented to person, place, and time. No cranial nerve deficit.   Skin: Skin is warm and dry. Capillary refill takes less than 2 seconds. No rash noted.   Psychiatric: She has a normal mood and affect. Her behavior is normal.   Nursing note and vitals reviewed.      Procedures    ASSESSMENT/ PLAN:        Problem List Items Addressed This Visit        Cardiovascular and Mediastinum    Essential hypertension - Primary    Relevant Medications    metoprolol succinate XL (TOPROL-XL) 25 MG 24 hr tablet       Other    Anxiety    Relevant Medications    metoprolol succinate XL (TOPROL-XL) 25 MG 24 hr tablet                   Specific Patient Instructions:  MEDICATION Instructions: Encouraged patient to continue routine medicines as prescribed and maintain compliance. Patient instructed to report any adverse side effects or reactions to medicines promptly to the office. Patient instructed to make us aware of any OTC or herbal meds or supplement use.  DIET Recommendations: No new recommendations regarding diet/restrictions.  EXERCISE Instructions: No new recommendations.    SMOKING Recommendations: N/A  HEALTH MAINTENANCE:  N/A  MISCELLANEOUS Instructions: N/A      Medications ordered or changed this visit:  New Medications Ordered This Visit   Medications   • metoprolol succinate XL (TOPROL-XL) 25 MG 24 hr tablet     Sig: Take 1 tablet by mouth Daily.     Dispense:  30 tablet      Refill:  5        FOLLOW-UP:  Return in about 4 weeks (around 5/29/2019) for Recheck.    I discussed the patients findings and my recommendations with patient.  An After Visit Summary (AVS) was printed and given to the patient at discharge.      Reji Jacobs MD, FAAFP  5/3/2019

## 2019-05-01 NOTE — PATIENT INSTRUCTIONS
Suspect Essential HTN.Good BP control is encouraged with Goal BP based on JNC 8 guidelines 2014 <140/90 for patients with known cardiac disease and diabetes. (MARQUIS. 2014:322 (5):507-520. doi:10.1001/marquis.2013.96610): general population <60 yr old goal BP <140/90 and for those >60 <150/90.  For patients of all ages with Diabetes, CKD, Known CAD <140/90. Recommended to the patient to obtain electronic home BP machine with upper arm blood pressure cuff and to check regularly as instructed.  Keep BP log and bring to subsequent visits. Stable, at goal.  a. LABS: routine for hypertension recommended and ordered if necessary.  b. Recommend if you do not have a home BP machine to obtain an electronic machine with arm blood pressure cuff.      c. Monitor BP over the next week and keep log to bring back to office. Discussed medication therapy however pt wants to try to control with diet exercise. .  Your provider  has recommended self-monitoring of your blood pressure.  If you do not have a blood pressure cuff you may purchase one from the local pharmacy.  You may ask the pharmacist which brand and model they recommend.  Obtain your blood pressure measurement at least 2x per week.  You should also check your blood pressure if you experience any symptoms of blurred visit, dizziness or headache.  Please record all blood pressure measurements and bring them to next office visit.  If you have any questions about the accuracy of your blood pressure machine please bring it in to the office and our staff will be happy to check accuracy.   d. Encouraged to eat a low sodium heart healthy diet  e. Offered handout on HTN educational topics.  These were provided if patient requested these today.  f. MEDS: as listed in today's visit.  g. Risks/benefits of current and new medications discussed with the patient and or family today.  The patient/family are aware and accept that if there any side effects they should call or return to clinic  as soon as possible.  Appropriate F/U discussed for topics addressed today. All questions were answered to the  satisfactory state of patient/family.  Should symptoms fail to improve or worsen they agree to call or return to clinic or to go to the ER. Education handouts were offered on any new Rx if requested.  Discussed the importance of following up with any needed screening tests/labs/specialist appointments and any requested follow-up recommended by me today.  Importance of maintaining follow-up discussed and patient accepts that missed appointments can delay diagnosis and potentially lead to worsening of conditions.

## 2019-05-17 ENCOUNTER — PROCEDURE VISIT (OUTPATIENT)
Dept: FAMILY MEDICINE CLINIC | Facility: CLINIC | Age: 69
End: 2019-05-17

## 2019-05-17 VITALS
HEART RATE: 86 BPM | OXYGEN SATURATION: 93 % | SYSTOLIC BLOOD PRESSURE: 122 MMHG | DIASTOLIC BLOOD PRESSURE: 76 MMHG | HEIGHT: 60 IN | WEIGHT: 159.4 LBS | TEMPERATURE: 98.4 F | BODY MASS INDEX: 31.29 KG/M2

## 2019-05-17 DIAGNOSIS — I10 ESSENTIAL HYPERTENSION: ICD-10-CM

## 2019-05-17 DIAGNOSIS — D22.9 CHANGE IN SKIN MOLE: Primary | ICD-10-CM

## 2019-05-17 PROCEDURE — 17110 DESTRUCTION B9 LES UP TO 14: CPT | Performed by: FAMILY MEDICINE

## 2019-05-17 NOTE — PATIENT INSTRUCTIONS
Mole  A mole is a colored (pigmented) growth on the skin. Moles are very common. They are usually harmless, but some moles can become cancerous over time.  What are the causes?  Moles occur when pigmented skin cells grow together in clusters instead of spreading out in the skin as they normally do. The reason why the skin cells grow together in clusters is not known.  What are the signs or symptoms?  A mole may be:  · Brown or black.  · Flat or raised.  · Smooth or wrinkled.    How is this diagnosed?  A mole is diagnosed with a skin exam. If your health care provider thinks a mole may be cancerous, a piece of the mole will be removed for testing.  How is this treated?  Treatment is not needed unless a mole is cancerous. If a mole is cancerous, it will be removed. If a mole is causing pain or you do not like the way it looks, you may choose to have it removed.  Follow these instructions at home:  · Every month, look for new moles and check your existing moles for changes. This is important because a change in a mole can mean that the mole has become cancerous. Look for changes in:  ? Size. Look for moles that are more than ¼ in (0.64 cm) wide (in diameter).  ? Shape. Look for moles that are not round or oval.  ? Borders. Look for moles that are not symmetrical.  ? Color. Note that it is normal for moles to get darker during pregnancy or when you take birth control pills.  · When you are outdoors, wear sunscreen with SPF 30 (sun protection factor 30) or higher. Reapply the sunscreen every 2-3 hours.  · If you have a large number of moles, see a skin doctor (dermatologist) at least one time every year.  Contact a health care provider if:  · The size, shape, borders, or color of your mole change.  · Your mole, or the skin near the mole, becomes painful, sore, red, or swollen.  · Your mole:  ? Develops more than one color.  ? Itches or bleeds.  ? Becomes scaly, sheds skin, or oozes fluid.  ? Becomes flat or develops  raised areas.  ? Becomes hard or soft.  · You develop a new mole.  This information is not intended to replace advice given to you by your health care provider. Make sure you discuss any questions you have with your health care provider.  Document Released: 09/12/2002 Document Revised: 05/31/2017 Document Reviewed: 10/07/2016  [employer] Patient Education © 2019 [employer]

## 2019-05-17 NOTE — PROGRESS NOTES
OFFICE VISIT NOTE:    Shanell Coffman is a 68 y.o. female who presents today for skin lesion removal (behind L ear).     HTN much better controlled!!!  Right mastoid region 2 moles that are hurting and are increasing in size over the last month!     Elected to undergo excision.          Past medical/surgical history, Family history, Social history, Allergies and Medications have been reviewed with the patient today and are updated in [facility] EMR. See below.    Past Medical History:   Diagnosis Date   • Anxiety    • Chronic pain    • COPD (chronic obstructive pulmonary disease) (CMS/HCC)    • GERD (gastroesophageal reflux disease)    • Hyperlipidemia      Past Surgical History:   Procedure Laterality Date   • APPENDECTOMY     • CHOLECYSTECTOMY     • COLON SURGERY     • TUBAL ABDOMINAL LIGATION       Family History   Problem Relation Age of Onset   • Heart disease Mother    • Hyperlipidemia Mother    • Hypertension Mother    • Cancer Father    • Mental illness Father    • Diabetes Maternal Grandmother    • Heart attack Maternal Grandfather    • Obesity Paternal Grandmother    • Stroke Paternal Grandfather      Social History     Tobacco Use   • Smoking status: Never Smoker   • Smokeless tobacco: Never Used   Substance Use Topics   • Alcohol use: No     Frequency: Never     Binge frequency: Never   • Drug use: No       Allergies:  Ceclor [cefaclor]; Keflex [cephalexin]; Stadol [butorphanol]; and Seroquel [quetiapine]    Current Meds:    Current Outpatient Medications:   •  albuterol (PROVENTIL) (2.5 MG/3ML) 0.083% nebulizer solution, USE 3 ML PER NEBULIZER 4 TIMES A DAY, Disp: 375 mL, Rfl: 0  •  albuterol sulfate  (90 Base) MCG/ACT inhaler, Inhale 2 puffs Every 4 (Four) Hours As Needed for Wheezing or Shortness of Air., Disp: 18 g, Rfl: 5  •  ALPRAZolam (XANAX) 1 MG tablet, Take 1 tablet by mouth 3 (Three) Times a Day As Needed for Anxiety., Disp: 90 tablet, Rfl: 2  •  amLODIPine (NORVASC) 10 MG tablet, TAKE 1 TABLET  BY MOUTH EVERY DAY, Disp: 90 tablet, Rfl: 1  •  baclofen (LIORESAL) 10 MG tablet, Take 10 mg by mouth Daily., Disp: , Rfl: 2  •  Calcium Carbonate-Vitamin D (OYSTER SHELL CALCIUM 500 + D) 500-125 MG-UNIT tablet, TAKE 1 TABLET BY MOUTH WITH FOOD, Disp: 90 each, Rfl: 2  •  calcium-vitamin D (OSCAL 500/200 D-3) 500-200 MG-UNIT per tablet, Take 1 tablet by mouth Daily., Disp: 30 tablet, Rfl: 11  •  CloNIDine (CATAPRES) 0.2 MG tablet, Take 1 tablet by mouth 2 (Two) Times a Day. Begin evening of 4/22/19, and DC patches, Disp: 60 tablet, Rfl: 5  •  dicyclomine (BENTYL) 20 MG tablet, TAKE 1 TABLET BY MOUTH 4 TIMES A DAY PRB, Disp: , Rfl: 2  •  diltiaZEM (TIAZAC) 360 MG 24 hr capsule, Take 1 capsule by mouth Daily., Disp: 30 capsule, Rfl: 5  •  doxepin (SINEquan) 50 MG capsule, Take 1 capsule by mouth Every Night., Disp: 30 capsule, Rfl: 5  •  escitalopram (LEXAPRO) 20 MG tablet, Take 1 tablet by mouth Every Night., Disp: 30 tablet, Rfl: 5  •  furosemide (LASIX) 40 MG tablet, Take 40 mg by mouth Daily., Disp: , Rfl:   •  ketoconazole (NIZORAL) 2 % cream, Apply  topically to the appropriate area as directed Daily., Disp: 60 g, Rfl: 5  •  losartan (COZAAR) 50 MG tablet, Take 100 mg by mouth Daily., Disp: , Rfl:   •  metoclopramide (REGLAN) 5 MG tablet, TAKE 1 TABLET BY MOUTH 4 TIMES A DAY AS NEEDED, Disp: 120 tablet, Rfl: 5  •  metoprolol succinate XL (TOPROL-XL) 25 MG 24 hr tablet, Take 1 tablet by mouth Daily., Disp: 30 tablet, Rfl: 5  •  mirtazapine (REMERON) 15 MG tablet, Take 15 mg by mouth every night at bedtime., Disp: , Rfl: 3  •  O2 (OXYGEN), Inhale 2 L/min 1 (One) Time., Disp: , Rfl:   •  oxyCODONE-acetaminophen (PERCOCET) 7.5-325 MG per tablet, Take 1 tablet by mouth 4 (Four) Times a Day., Disp: , Rfl: 0  •  pantoprazole (PROTONIX) 40 MG EC tablet, Take 40 mg by mouth Daily., Disp: , Rfl:   •  promethazine (PHENERGAN) 25 MG tablet, TAKE 1 TABLET(S) BY MOUTH DAILY AS NEEDED NAUSEA/VOMITING, Disp: , Rfl: 5  •   "spironolactone (ALDACTONE) 25 MG tablet, Take 1 tablet by mouth Daily., Disp: 30 tablet, Rfl: 5  •  terbinafine (LAMISIL) 250 MG tablet, Take 1 tablet by mouth Daily., Disp: 14 tablet, Rfl: 2    Review of Systems:  Review of Systems   Constitutional: Negative for activity change, fatigue, fever, unexpected weight gain and unexpected weight loss.   Respiratory: Negative for shortness of breath.    Cardiovascular: Negative for chest pain.   Gastrointestinal: Negative for abdominal pain.   Genitourinary: Negative for difficulty urinating.   Skin: Negative for rash.   Neurological: Negative for syncope and headache.       Physical Examination:  Vital Signs:  /76 (BP Location: Left arm, Patient Position: Sitting, Cuff Size: Adult)   Pulse 86   Temp 98.4 °F (36.9 °C) (Tympanic)   Ht 152.4 cm (60\")   Wt 72.3 kg (159 lb 6.4 oz)   LMP  (LMP Unknown)   SpO2 93%   Breastfeeding? No   BMI 31.13 kg/m²   Physical Exam   Constitutional: She is oriented to person, place, and time. She appears well-developed and well-nourished. No distress.   HENT:   Head: Normocephalic and atraumatic.   Mouth/Throat: Oropharynx is clear and moist.   Neck: Normal range of motion. Neck supple. No JVD present.   Cardiovascular: Normal rate, regular rhythm, normal heart sounds and intact distal pulses.   Pulmonary/Chest: Effort normal and breath sounds normal. No respiratory distress.   Musculoskeletal: Normal range of motion. She exhibits no edema.   Neurological: She is alert and oriented to person, place, and time. No cranial nerve deficit.   Skin: Skin is warm and dry. Capillary refill takes less than 2 seconds. No rash noted.   2 pedunculated lesions on right mastoid region were getting larger and hurting her,especially when clothing or necklaces rubbed them. Both were about 4mm diameter each.   Psychiatric: She has a normal mood and affect. Her behavior is normal.   Nursing note and vitals reviewed.      Procedures    ASSESSMENT/ " PLAN:        Problem List Items Addressed This Visit        Cardiovascular and Mediastinum    Essential hypertension      Other Visit Diagnoses     Change in skin mole    -  Primary        After informed consent and time out, and R/B/A discussed, the area was prepped and draped in sterile fashion and anesthetized with 1% Lidocaine (about 1.5ml). Then with forceps and hyfrecation, both the lesions were removed en toto, and she tolerated it well. Antibiotic ointment and sterile dressing applied. Follow-up PRN - discussed treatment of it with patient.           Specific Patient Instructions:  MEDICATION Instructions: Encouraged patient to continue routine medicines as prescribed and maintain compliance. Patient instructed to report any adverse side effects or reactions to medicines promptly to the office. Patient instructed to make us aware of any OTC or herbal meds or supplement use.  DIET Recommendations: No new recommendations regarding diet/restrictions.  EXERCISE Instructions: No new recommendations.    SMOKING Recommendations: N/A  HEALTH MAINTENANCE:  N/A  MISCELLANEOUS Instructions: N/A      Medications ordered or changed this visit:  No orders of the defined types were placed in this encounter.       FOLLOW-UP:  Return if symptoms worsen or fail to improve, for Recheck, Next scheduled follow up.    I discussed the patients findings and my recommendations with patient.  An After Visit Summary (AVS) was printed and given to the patient at discharge.      Reji Jacobs MD, FAAFP  5/19/2019

## 2019-06-04 DIAGNOSIS — F41.9 ANXIETY: ICD-10-CM

## 2019-06-04 DIAGNOSIS — B35.4 TINEA CORPORIS: ICD-10-CM

## 2019-06-04 RX ORDER — TERBINAFINE HYDROCHLORIDE 250 MG/1
250 TABLET ORAL DAILY
Qty: 14 TABLET | Refills: 2 | Status: SHIPPED | OUTPATIENT
Start: 2019-06-04 | End: 2019-07-19 | Stop reason: SDUPTHER

## 2019-06-04 RX ORDER — ALPRAZOLAM 1 MG/1
1 TABLET ORAL 3 TIMES DAILY PRN
Qty: 90 TABLET | Refills: 0 | Status: SHIPPED | OUTPATIENT
Start: 2019-06-04 | End: 2019-07-19 | Stop reason: SDUPTHER

## 2019-06-04 NOTE — TELEPHONE ENCOUNTER
Pt called requesting refill of x2 medications. She would like these sent to [facility]. Please call for med and dosage.

## 2019-06-19 DIAGNOSIS — I10 ESSENTIAL HYPERTENSION: ICD-10-CM

## 2019-06-19 RX ORDER — DILTIAZEM HYDROCHLORIDE 360 MG/1
CAPSULE, EXTENDED RELEASE ORAL
Qty: 90 CAPSULE | Refills: 1 | Status: SHIPPED | OUTPATIENT
Start: 2019-06-19 | End: 2019-12-19

## 2019-07-01 ENCOUNTER — TELEPHONE (OUTPATIENT)
Dept: FAMILY MEDICINE CLINIC | Facility: CLINIC | Age: 69
End: 2019-07-01

## 2019-07-01 NOTE — TELEPHONE ENCOUNTER
Pt says she has broken her wrist, but hasnt got any xrays yet. She fell on right wrist last night, she knows for certain that it is broken,   She wants to got to Dr Hameed for a referral.

## 2019-07-02 NOTE — TELEPHONE ENCOUNTER
Patient went to [facility] in [place] on 7/1/19 and they completed xrays and informed patient that she had a hairline fracture and placed her in a wrist splint.  Patient stated [facility] provided her with the disc and the needed paperwork for a referral.  Patient stated she does not need anything from us at this time and thanked the clinic for checking on her.

## 2019-07-19 ENCOUNTER — OFFICE VISIT (OUTPATIENT)
Dept: FAMILY MEDICINE CLINIC | Facility: CLINIC | Age: 69
End: 2019-07-19

## 2019-07-19 VITALS
WEIGHT: 165 LBS | OXYGEN SATURATION: 93 % | BODY MASS INDEX: 32.39 KG/M2 | DIASTOLIC BLOOD PRESSURE: 68 MMHG | HEIGHT: 60 IN | SYSTOLIC BLOOD PRESSURE: 138 MMHG | HEART RATE: 86 BPM

## 2019-07-19 DIAGNOSIS — I10 ESSENTIAL HYPERTENSION: Primary | ICD-10-CM

## 2019-07-19 DIAGNOSIS — M62.830 LUMBAR PARASPINAL MUSCLE SPASM: ICD-10-CM

## 2019-07-19 DIAGNOSIS — B35.4 TINEA CORPORIS: ICD-10-CM

## 2019-07-19 DIAGNOSIS — F32.9 REACTIVE DEPRESSION: ICD-10-CM

## 2019-07-19 DIAGNOSIS — J41.8 MIXED SIMPLE AND MUCOPURULENT CHRONIC BRONCHITIS (HCC): ICD-10-CM

## 2019-07-19 DIAGNOSIS — F41.9 ANXIETY: ICD-10-CM

## 2019-07-19 PROCEDURE — 99214 OFFICE O/P EST MOD 30 MIN: CPT | Performed by: FAMILY MEDICINE

## 2019-07-19 RX ORDER — POTASSIUM CHLORIDE 1500 MG/1
20 TABLET, FILM COATED, EXTENDED RELEASE ORAL DAILY
Qty: 30 TABLET | Refills: 5 | Status: SHIPPED | OUTPATIENT
Start: 2019-07-19 | End: 2019-08-21 | Stop reason: SDUPTHER

## 2019-07-19 RX ORDER — FUROSEMIDE 40 MG/1
40 TABLET ORAL DAILY
Qty: 30 TABLET | Refills: 5 | Status: SHIPPED | OUTPATIENT
Start: 2019-07-19 | End: 2020-01-16

## 2019-07-19 RX ORDER — BACLOFEN 10 MG/1
10 TABLET ORAL DAILY
Qty: 30 TABLET | Refills: 2 | Status: SHIPPED | OUTPATIENT
Start: 2019-07-19 | End: 2019-10-19 | Stop reason: SDUPTHER

## 2019-07-19 RX ORDER — AMLODIPINE BESYLATE 10 MG/1
10 TABLET ORAL DAILY
Qty: 90 TABLET | Refills: 1 | Status: SHIPPED | OUTPATIENT
Start: 2019-07-19 | End: 2020-04-20

## 2019-07-19 RX ORDER — ALPRAZOLAM 1 MG/1
1 TABLET ORAL 3 TIMES DAILY PRN
Qty: 90 TABLET | Refills: 0 | Status: SHIPPED | OUTPATIENT
Start: 2019-07-19 | End: 2019-09-04 | Stop reason: SDUPTHER

## 2019-07-19 RX ORDER — NYSTATIN 100000 [USP'U]/G
POWDER TOPICAL 3 TIMES DAILY
Qty: 60 G | Refills: 3 | Status: SHIPPED | OUTPATIENT
Start: 2019-07-19 | End: 2019-09-04 | Stop reason: SDUPTHER

## 2019-07-19 RX ORDER — TERBINAFINE HYDROCHLORIDE 250 MG/1
250 TABLET ORAL DAILY
Qty: 14 TABLET | Refills: 2 | Status: SHIPPED | OUTPATIENT
Start: 2019-07-19 | End: 2019-09-04 | Stop reason: SDUPTHER

## 2019-07-19 RX ORDER — ESCITALOPRAM OXALATE 20 MG/1
20 TABLET ORAL NIGHTLY
Qty: 30 TABLET | Refills: 5 | Status: SHIPPED | OUTPATIENT
Start: 2019-07-19 | End: 2019-09-04 | Stop reason: SDUPTHER

## 2019-07-19 NOTE — PATIENT INSTRUCTIONS
Suspect Essential HTN.Good BP control is encouraged with Goal BP based on JNC 8 guidelines 2014 <140/90 for patients with known cardiac disease and diabetes. (MARQUIS. 2014:322 (5):507-520. doi:[email or web address]): general population <60 yr old goal BP <140/90 and for those >60 <150/90.  For patients of all ages with Diabetes, CKD, Known CAD <140/90. Recommended to the patient to obtain electronic home BP machine with upper arm blood pressure cuff and to check regularly as instructed.  Keep BP log and bring to subsequent visits. Stable, at goal.  a. LABS: routine for hypertension recommended and ordered if necessary.  b. Recommend if you do not have a home BP machine to obtain an electronic machine with arm blood pressure cuff.      c. Monitor BP over the next week and keep log to bring back to office. Discussed medication therapy however pt wants to try to control with diet exercise. .  Your provider  has recommended self-monitoring of your blood pressure.  If you do not have a blood pressure cuff you may purchase one from the local pharmacy.  You may ask the pharmacist which brand and model they recommend.  Obtain your blood pressure measurement at least 2x per week.  You should also check your blood pressure if you experience any symptoms of blurred visit, dizziness or headache.  Please record all blood pressure measurements and bring them to next office visit.  If you have any questions about the accuracy of your blood pressure machine please bring it in to the office and our staff will be happy to check accuracy.   d. Encouraged to eat a low sodium heart healthy diet  e. Offered handout on HTN educational topics.  These were provided if patient requested these today.  f. MEDS: as listed in today's visit.  g. Risks/benefits of current and new medications discussed with the patient and or family today.  The patient/family are aware and accept that if there any side effects they should call or return to clinic  as soon as possible.  Appropriate F/U discussed for topics addressed today. All questions were answered to the  satisfactory state of patient/family.  Should symptoms fail to improve or worsen they agree to call or return to clinic or to go to the ER. Education handouts were offered on any new Rx if requested.  Discussed the importance of following up with any needed screening tests/labs/specialist appointments and any requested follow-up recommended by me today.  Importance of maintaining follow-up discussed and patient accepts that missed appointments can delay diagnosis and potentially lead to worsening of conditions.      Generalized Anxiety Disorder, Adult  Generalized anxiety disorder (SHENG) is a mental health disorder. People with this condition constantly worry about everyday events. Unlike normal anxiety, worry related to SHENG is not triggered by a specific event. These worries also do not fade or get better with time. SHENG interferes with life functions, including relationships, work, and school.  SHENG can vary from mild to severe. People with severe SHENG can have intense waves of anxiety with physical symptoms (panic attacks).  What are the causes?  The exact cause of SHENG is not known.  What increases the risk?  This condition is more likely to develop in:  · Women.  · People who have a family history of anxiety disorders.  · People who are very shy.  · People who experience very stressful life events, such as the death of a loved one.  · People who have a very stressful family environment.    What are the signs or symptoms?  People with SHENG often worry excessively about many things in their lives, such as their health and family. They may also be overly concerned about:  · Doing well at work.  · Being on time.  · Natural disasters.  · Friendships.    Physical symptoms of SHENG include:  · Fatigue.  · Muscle tension or having muscle twitches.  · Trembling or feeling shaky.  · Being easily startled.  · Feeling  like your heart is pounding or racing.  · Feeling out of breath or like you cannot take a deep breath.  · Having trouble falling asleep or staying asleep.  · Sweating.  · Nausea, diarrhea, or irritable bowel syndrome (IBS).  · Headaches.  · Trouble concentrating or remembering facts.  · Restlessness.  · Irritability.    How is this diagnosed?  Your health care provider can diagnose SHENG based on your symptoms and medical history. You will also have a physical exam. The health care provider will ask specific questions about your symptoms, including how severe they are, when they started, and if they come and go. Your health care provider may ask you about your use of alcohol or drugs, including prescription medicines. Your health care provider may refer you to a mental health specialist for further evaluation.  Your health care provider will do a thorough examination and may perform additional tests to rule out other possible causes of your symptoms.  To be diagnosed with SHENG, a person must have anxiety that:  · Is out of his or her control.  · Affects several different aspects of his or her life, such as work and relationships.  · Causes distress that makes him or her unable to take part in normal activities.  · Includes at least three physical symptoms of SHENG, such as restlessness, fatigue, trouble concentrating, irritability, muscle tension, or sleep problems.    Before your health care provider can confirm a diagnosis of SHENG, these symptoms must be present more days than they are not, and they must last for six months or longer.  How is this treated?  The following therapies are usually used to treat SHENG:  · Medicine. Antidepressant medicine is usually prescribed for long-term daily control. Antianxiety medicines may be added in severe cases, especially when panic attacks occur.  · Talk therapy (psychotherapy). Certain types of talk therapy can be helpful in treating SHENG by providing support, education, and  guidance. Options include:  ? Cognitive behavioral therapy (CBT). People learn coping skills and techniques to ease their anxiety. They learn to identify unrealistic or negative thoughts and behaviors and to replace them with positive ones.  ? Acceptance and commitment therapy (ACT). This treatment teaches people how to be mindful as a way to cope with unwanted thoughts and feelings.  ? Biofeedback. This process trains you to manage your body's response (physiological response) through breathing techniques and relaxation methods. You will work with a therapist while machines are used to monitor your physical symptoms.  · Stress management techniques. These include yoga, meditation, and exercise.    A mental health specialist can help determine which treatment is best for you. Some people see improvement with one type of therapy. However, other people require a combination of therapies.  Follow these instructions at home:  · Take over-the-counter and prescription medicines only as told by your health care provider.  · Try to maintain a normal routine.  · Try to anticipate stressful situations and allow extra time to manage them.  · Practice any stress management or self-calming techniques as taught by your health care provider.  · Do not punish yourself for setbacks or for not making progress.  · Try to recognize your accomplishments, even if they are small.  · Keep all follow-up visits as told by your health care provider. This is important.  Contact a health care provider if:  · Your symptoms do not get better.  · Your symptoms get worse.  · You have signs of depression, such as:  ? A persistently sad, cranky, or irritable mood.  ? Loss of enjoyment in activities that used to bring you bella.  ? Change in weight or eating.  ? Changes in sleeping habits.  ? Avoiding friends or family members.  ? Loss of energy for normal tasks.  ? Feelings of guilt or worthlessness.  Get help right away if:  · You have serious  thoughts about hurting yourself or others.  If you ever feel like you may hurt yourself or others, or have thoughts about taking your own life, get help right away. You can go to your nearest emergency department or call:  · Your local emergency services (911 in the [place]  · A suicide crisis helpline, such as the National Suicide Prevention Lifeline at 1-116.171.6546. This is open 24 hours a day.    Summary  · Generalized anxiety disorder (SHENG) is a mental health disorder that involves worry that is not triggered by a specific event.  · People with SHENG often worry excessively about many things in their lives, such as their health and family.  · SHENG may cause physical symptoms such as restlessness, trouble concentrating, sleep problems, frequent sweating, nausea, diarrhea, headaches, and trembling or muscle twitching.  · A mental health specialist can help determine which treatment is best for you. Some people see improvement with one type of therapy. However, other people require a combination of therapies.  This information is not intended to replace advice given to you by your health care provider. Make sure you discuss any questions you have with your health care provider.  Document Released: 04/14/2014 Document Revised: 11/07/2017 Document Reviewed: 11/07/2017  [employer] Patient Education © 2019 [employer]      Calorie Counting for Weight Loss  Calories are units of energy. Your body needs a certain amount of calories from food to keep you going throughout the day. When you eat more calories than your body needs, your body stores the extra calories as fat. When you eat fewer calories than your body needs, your body burns fat to get the energy it needs.  Calorie counting means keeping track of how many calories you eat and drink each day. Calorie counting can be helpful if you need to lose weight. If you make sure to eat fewer calories than your body needs, you should lose weight. Ask your health  care provider what a healthy weight is for you.  For calorie counting to work, you will need to eat the right number of calories in a day in order to lose a healthy amount of weight per week. A dietitian can help you determine how many calories you need in a day and will give you suggestions on how to reach your calorie goal.  · A healthy amount of weight to lose per week is usually 1-2 lb (0.5-0.9 kg). This usually means that your daily calorie intake should be reduced by 500-750 calories.  · Eating 1,200 - 1,500 calories per day can help most women lose weight.  · Eating 1,500 - 1,800 calories per day can help most men lose weight.    What is my plan?  My goal is to have __________ calories per day.  If I have this many calories per day, I should lose around __________ pounds per week.  What do I need to know about calorie counting?  In order to meet your daily calorie goal, you will need to:  · Find out how many calories are in each food you would like to eat. Try to do this before you eat.  · Decide how much of the food you plan to eat.  · Write down what you ate and how many calories it had. Doing this is called keeping a food log.    To successfully lose weight, it is important to balance calorie counting with a healthy lifestyle that includes regular activity. Aim for 150 minutes of moderate exercise (such as walking) or 75 minutes of vigorous exercise (such as running) each week.  Where do I find calorie information?    The number of calories in a food can be found on a Nutrition Facts label. If a food does not have a Nutrition Facts label, try to look up the calories online or ask your dietitian for help.  Remember that calories are listed per serving. If you choose to have more than one serving of a food, you will have to multiply the calories per serving by the amount of servings you plan to eat. For example, the label on a package of bread might say that a serving size is 1 slice and that there are 90  "calories in a serving. If you eat 1 slice, you will have eaten 90 calories. If you eat 2 slices, you will have eaten 180 calories.  How do I keep a food log?  Immediately after each meal, record the following information in your food log:  · What you ate. Don't forget to include toppings, sauces, and other extras on the food.  · How much you ate. This can be measured in cups, ounces, or number of items.  · How many calories each food and drink had.  · The total number of calories in the meal.    Keep your food log near you, such as in a small notebook in your pocket, or use a mobile jyoti or website. Some programs will calculate calories for you and show you how many calories you have left for the day to meet your goal.  What are some calorie counting tips?  · Use your calories on foods and drinks that will fill you up and not leave you hungry:  ? Some examples of foods that fill you up are nuts and nut butters, vegetables, lean proteins, and high-fiber foods like whole grains. High-fiber foods are foods with more than 5 g fiber per serving.  ? Drinks such as sodas, specialty coffee drinks, alcohol, and juices have a lot of calories, yet do not fill you up.  · Eat nutritious foods and avoid empty calories. Empty calories are calories you get from foods or beverages that do not have many vitamins or protein, such as candy, sweets, and soda. It is better to have a nutritious high-calorie food (such as an avocado) than a food with few nutrients (such as a bag of chips).  · Know how many calories are in the foods you eat most often. This will help you calculate calorie counts faster.  · Pay attention to calories in drinks. Low-calorie drinks include water and unsweetened drinks.  · Pay attention to nutrition labels for \"low fat\" or \"fat free\" foods. These foods sometimes have the same amount of calories or more calories than the full fat versions. They also often have added sugar, starch, or salt, to make up for flavor " that was removed with the fat.  · Find a way of tracking calories that works for you. Get creative. Try different apps or programs if writing down calories does not work for you.  What are some portion control tips?  · Know how many calories are in a serving. This will help you know how many servings of a certain food you can have.  · Use a measuring cup to measure serving sizes. You could also try weighing out portions on a kitchen scale. With time, you will be able to estimate serving sizes for some foods.  · Take some time to put servings of different foods on your favorite plates, bowls, and cups so you know what a serving looks like.  · Try not to eat straight from a bag or box. Doing this can lead to overeating. Put the amount you would like to eat in a cup or on a plate to make sure you are eating the right portion.  · Use smaller plates, glasses, and bowls to prevent overeating.  · Try not to multitask (for example, watch TV or use your computer) while eating. If it is time to eat, sit down at a table and enjoy your food. This will help you to know when you are full. It will also help you to be aware of what you are eating and how much you are eating.  What are tips for following this plan?  Reading food labels  · Check the calorie count compared to the serving size. The serving size may be smaller than what you are used to eating.  · Check the source of the calories. Make sure the food you are eating is high in vitamins and protein and low in saturated and trans fats.  Shopping  · Read nutrition labels while you shop. This will help you make healthy decisions before you decide to purchase your food.  · Make a grocery list and stick to it.  Cooking  · Try to cook your favorite foods in a healthier way. For example, try baking instead of frying.  · Use low-fat dairy products.  Meal planning  · Use more fruits and vegetables. Half of your plate should be fruits and vegetables.  · Include lean proteins like  poultry and fish.  How do I count calories when eating out?  · Ask for smaller portion sizes.  · Consider sharing an entree and sides instead of getting your own entree.  · If you get your own entree, eat only half. Ask for a box at the beginning of your meal and put the rest of your entree in it so you are not tempted to eat it.  · If calories are listed on the menu, choose the lower calorie options.  · Choose dishes that include vegetables, fruits, whole grains, low-fat dairy products, and lean protein.  · Choose items that are boiled, broiled, grilled, or steamed. Stay away from items that are buttered, battered, fried, or served with cream sauce. Items labeled “crispy” are usually fried, unless stated otherwise.  · Choose water, low-fat milk, unsweetened iced tea, or other drinks without added sugar. If you want an alcoholic beverage, choose a lower calorie option such as a glass of wine or light beer.  · Ask for dressings, sauces, and syrups on the side. These are usually high in calories, so you should limit the amount you eat.  · If you want a salad, choose a garden salad and ask for grilled meats. Avoid extra toppings like warner, cheese, or fried items. Ask for the dressing on the side, or ask for olive oil and vinegar or lemon to use as dressing.  · Estimate how many servings of a food you are given. For example, a serving of cooked rice is ½ cup or about the size of half a baseball. Knowing serving sizes will help you be aware of how much food you are eating at restaurants. The list below tells you how big or small some common portion sizes are based on everyday objects:  ? 1 oz--4 stacked dice.  ? 3 oz--1 deck of cards.  ? 1 tsp--1 die.  ? 1 Tbsp--½ a ping-pong ball.  ? 2 Tbsp--1 ping-pong ball.  ? ½ cup--½ baseball.  ? 1 cup--1 baseball.  Summary  · Calorie counting means keeping track of how many calories you eat and drink each day. If you eat fewer calories than your body needs, you should lose  weight.  · A healthy amount of weight to lose per week is usually 1-2 lb (0.5-0.9 kg). This usually means reducing your daily calorie intake by 500-750 calories.  · The number of calories in a food can be found on a Nutrition Facts label. If a food does not have a Nutrition Facts label, try to look up the calories online or ask your dietitian for help.  · Use your calories on foods and drinks that will fill you up, and not on foods and drinks that will leave you hungry.  · Use smaller plates, glasses, and bowls to prevent overeating.  This information is not intended to replace advice given to you by your health care provider. Make sure you discuss any questions you have with your health care provider.  Document Released: 12/18/2006 Document Revised: 11/17/2017 Document Reviewed: 11/17/2017  [employer] Patient Education © 2019 [employer]

## 2019-07-19 NOTE — PROGRESS NOTES
OFFICE VISIT NOTE:    Shanell Coffman is a 68 y.o. female who presents today for Hypertension (f/u).     Hypertension   This is a chronic problem. The current episode started more than 1 year ago. The problem has been gradually improving since onset. The problem is controlled. Pertinent negatives include no chest pain, headaches, orthopnea, palpitations, peripheral edema, PND or shortness of breath. There are no associated agents to hypertension. Risk factors for coronary artery disease include sedentary lifestyle, stress, obesity, post-menopausal state, dyslipidemia, family history and diabetes mellitus. Prescription medication: multiple med groups. The current treatment provides significant improvement. There are no compliance problems.         Past medical/surgical history, Family history, Social history, Allergies and Medications have been reviewed with the patient today and are updated in [facility] EMR. See below.    Past Medical History:   Diagnosis Date   • Anxiety    • Chronic pain    • COPD (chronic obstructive pulmonary disease) (CMS/HCC)    • GERD (gastroesophageal reflux disease)    • Hyperlipidemia      Past Surgical History:   Procedure Laterality Date   • APPENDECTOMY     • CHOLECYSTECTOMY     • COLON SURGERY     • TUBAL ABDOMINAL LIGATION       Family History   Problem Relation Age of Onset   • Heart disease Mother    • Hyperlipidemia Mother    • Hypertension Mother    • Cancer Father    • Mental illness Father    • Diabetes Maternal Grandmother    • Heart attack Maternal Grandfather    • Obesity Paternal Grandmother    • Stroke Paternal Grandfather      Social History     Tobacco Use   • Smoking status: Never Smoker   • Smokeless tobacco: Never Used   Substance Use Topics   • Alcohol use: No     Frequency: Never     Binge frequency: Never   • Drug use: No       Allergies:  Ceclor [cefaclor]; Keflex [cephalexin]; Stadol [butorphanol]; and Seroquel [quetiapine]    Current Meds:    Current Outpatient  Medications:   •  albuterol (PROVENTIL) (2.5 MG/3ML) 0.083% nebulizer solution, USE 3 ML PER NEBULIZER 4 TIMES A DAY, Disp: 375 mL, Rfl: 0  •  albuterol sulfate  (90 Base) MCG/ACT inhaler, Inhale 2 puffs Every 4 (Four) Hours As Needed for Wheezing or Shortness of Air., Disp: 18 g, Rfl: 5  •  ALPRAZolam (XANAX) 1 MG tablet, Take 1 tablet by mouth 3 (Three) Times a Day As Needed for Anxiety., Disp: 90 tablet, Rfl: 0  •  amLODIPine (NORVASC) 10 MG tablet, Take 1 tablet by mouth Daily., Disp: 90 tablet, Rfl: 1  •  baclofen (LIORESAL) 10 MG tablet, Take 1 tablet by mouth Daily., Disp: 30 tablet, Rfl: 2  •  Calcium Carbonate-Vitamin D (OYSTER SHELL CALCIUM 500 + D) 500-125 MG-UNIT tablet, TAKE 1 TABLET BY MOUTH WITH FOOD, Disp: 90 each, Rfl: 2  •  calcium-vitamin D (OSCAL 500/200 D-3) 500-200 MG-UNIT per tablet, Take 1 tablet by mouth Daily., Disp: 30 tablet, Rfl: 11  •  CloNIDine (CATAPRES) 0.2 MG tablet, Take 1 tablet by mouth 2 (Two) Times a Day. Begin evening of 4/22/19, and DC patches, Disp: 60 tablet, Rfl: 5  •  dicyclomine (BENTYL) 20 MG tablet, TAKE 1 TABLET BY MOUTH 4 TIMES A DAY PRB, Disp: , Rfl: 2  •  diltiaZEM (TIAZAC) 360 MG 24 hr capsule, TAKE 1 CAPSULE BY MOUTH EVERY DAY, Disp: 90 capsule, Rfl: 1  •  doxepin (SINEquan) 50 MG capsule, Take 1 capsule by mouth Every Night., Disp: 30 capsule, Rfl: 5  •  escitalopram (LEXAPRO) 20 MG tablet, Take 1 tablet by mouth Every Night., Disp: 30 tablet, Rfl: 5  •  ketoconazole (NIZORAL) 2 % cream, Apply  topically to the appropriate area as directed Daily., Disp: 60 g, Rfl: 5  •  losartan (COZAAR) 50 MG tablet, Take 100 mg by mouth Daily., Disp: , Rfl:   •  metoclopramide (REGLAN) 5 MG tablet, TAKE 1 TABLET BY MOUTH 4 TIMES A DAY AS NEEDED, Disp: 120 tablet, Rfl: 5  •  metoprolol succinate XL (TOPROL-XL) 25 MG 24 hr tablet, Take 1 tablet by mouth Daily., Disp: 30 tablet, Rfl: 5  •  mirtazapine (REMERON) 15 MG tablet, Take 15 mg by mouth every night at bedtime.,  "Disp: , Rfl: 3  •  O2 (OXYGEN), Inhale 2 L/min 1 (One) Time., Disp: , Rfl:   •  oxyCODONE-acetaminophen (PERCOCET) 7.5-325 MG per tablet, Take 1 tablet by mouth 4 (Four) Times a Day., Disp: , Rfl: 0  •  pantoprazole (PROTONIX) 40 MG EC tablet, Take 40 mg by mouth Daily., Disp: , Rfl:   •  promethazine (PHENERGAN) 25 MG tablet, TAKE 1 TABLET(S) BY MOUTH DAILY AS NEEDED NAUSEA/VOMITING, Disp: , Rfl: 5  •  terbinafine (LAMISIL) 250 MG tablet, Take 1 tablet by mouth Daily., Disp: 14 tablet, Rfl: 2  •  furosemide (LASIX) 40 MG tablet, Take 1 tablet by mouth Daily., Disp: 30 tablet, Rfl: 5  •  nystatin (MYCOSTATIN) 295337 UNIT/GM powder, Apply  topically to the appropriate area as directed 3 (Three) Times a Day., Disp: 60 g, Rfl: 3  •  potassium chloride (K-TAB) 20 MEQ tablet controlled-release ER tablet, Take 1 tablet by mouth Daily., Disp: 30 tablet, Rfl: 5    Review of Systems:  Review of Systems   Constitutional: Negative for activity change, fatigue, fever, unexpected weight gain and unexpected weight loss.   Respiratory: Negative for shortness of breath.    Cardiovascular: Negative for chest pain, palpitations, orthopnea and PND.   Gastrointestinal: Negative for abdominal pain.   Genitourinary: Negative for difficulty urinating.   Skin: Negative for rash.   Neurological: Negative for syncope and headache.       Physical Examination:  Vital Signs:  /68 (BP Location: Left arm, Patient Position: Sitting, Cuff Size: Adult)   Pulse 86   Ht 152.4 cm (60\")   Wt 74.8 kg (165 lb)   LMP  (LMP Unknown)   SpO2 93%   Breastfeeding? No   BMI 32.22 kg/m²   Physical Exam   Constitutional: She is oriented to person, place, and time. She appears well-developed and well-nourished. No distress.   HENT:   Head: Normocephalic and atraumatic.   Mouth/Throat: Oropharynx is clear and moist.   Neck: Normal range of motion. Neck supple. No JVD present.   Cardiovascular: Normal rate, regular rhythm, normal heart sounds and intact " distal pulses.   Pulmonary/Chest: Effort normal and breath sounds normal. No respiratory distress.   Musculoskeletal: Normal range of motion. She exhibits no edema.   Neurological: She is alert and oriented to person, place, and time. No cranial nerve deficit.   Skin: Skin is warm and dry. Capillary refill takes less than 2 seconds. No rash noted.   Psychiatric: She has a normal mood and affect. Her behavior is normal.   Nursing note and vitals reviewed.      Procedures    ASSESSMENT/ PLAN:        Problem List Items Addressed This Visit        Cardiovascular and Mediastinum    Essential hypertension - Primary    Relevant Medications    furosemide (LASIX) 40 MG tablet    potassium chloride (K-TAB) 20 MEQ tablet controlled-release ER tablet    amLODIPine (NORVASC) 10 MG tablet       Respiratory    Mixed simple and mucopurulent chronic bronchitis (CMS/HCC)    Relevant Orders    Pulmonary Function Test ([facility])       Musculoskeletal and Integument    Tinea corporis    Relevant Medications    terbinafine (LAMISIL) 250 MG tablet    nystatin (MYCOSTATIN) 697132 UNIT/GM powder    Lumbar paraspinal muscle spasm    Relevant Medications    baclofen (LIORESAL) 10 MG tablet       Other    Anxiety    Relevant Medications    ALPRAZolam (XANAX) 1 MG tablet    Reactive depression    Relevant Medications    ALPRAZolam (XANAX) 1 MG tablet    escitalopram (LEXAPRO) 20 MG tablet                   Specific Patient Instructions:  MEDICATION Instructions: Encouraged patient to continue routine medicines as prescribed and maintain compliance. Patient instructed to report any adverse side effects or reactions to medicines promptly to the office. Patient instructed to make us aware of any OTC or herbal meds or supplement use.  DIET Recommendations: No new recommendations regarding diet/restrictions.  EXERCISE Instructions: No new recommendations.    Patient's Body mass index is 32.22 kg/m². BMI is above normal parameters. Recommendations  include: exercise counseling and nutrition counseling.      SMOKING Recommendations: N/A  HEALTH MAINTENANCE:  N/A  MISCELLANEOUS Instructions: N/A      Medications ordered or changed this visit:  New Medications Ordered This Visit   Medications   • furosemide (LASIX) 40 MG tablet     Sig: Take 1 tablet by mouth Daily.     Dispense:  30 tablet     Refill:  5   • potassium chloride (K-TAB) 20 MEQ tablet controlled-release ER tablet     Sig: Take 1 tablet by mouth Daily.     Dispense:  30 tablet     Refill:  5   • ALPRAZolam (XANAX) 1 MG tablet     Sig: Take 1 tablet by mouth 3 (Three) Times a Day As Needed for Anxiety.     Dispense:  90 tablet     Refill:  0   • terbinafine (LAMISIL) 250 MG tablet     Sig: Take 1 tablet by mouth Daily.     Dispense:  14 tablet     Refill:  2   • baclofen (LIORESAL) 10 MG tablet     Sig: Take 1 tablet by mouth Daily.     Dispense:  30 tablet     Refill:  2   • amLODIPine (NORVASC) 10 MG tablet     Sig: Take 1 tablet by mouth Daily.     Dispense:  90 tablet     Refill:  1   • nystatin (MYCOSTATIN) 470588 UNIT/GM powder     Sig: Apply  topically to the appropriate area as directed 3 (Three) Times a Day.     Dispense:  60 g     Refill:  3   • escitalopram (LEXAPRO) 20 MG tablet     Sig: Take 1 tablet by mouth Every Night.     Dispense:  30 tablet     Refill:  5        FOLLOW-UP:  Return in about 3 months (around 10/19/2019) for Recheck.    I discussed the patients findings and my recommendations with patient.  An After Visit Summary (AVS) was printed and given to the patient at discharge.      Reji Jacobs MD, FAAFP  7/20/2019

## 2019-07-31 ENCOUNTER — TELEPHONE (OUTPATIENT)
Dept: FAMILY MEDICINE CLINIC | Facility: CLINIC | Age: 69
End: 2019-07-31

## 2019-08-05 RX ORDER — EPINEPHRINE 0.3 MG/.3ML
0.3 INJECTION SUBCUTANEOUS ONCE
Qty: 1 EACH | Refills: 0 | Status: SHIPPED | OUTPATIENT
Start: 2019-08-05 | End: 2019-08-05

## 2019-08-21 DIAGNOSIS — I10 ESSENTIAL HYPERTENSION: ICD-10-CM

## 2019-08-22 RX ORDER — POTASSIUM CHLORIDE 1500 MG/1
20 TABLET, FILM COATED, EXTENDED RELEASE ORAL DAILY
Qty: 90 TABLET | Refills: 1 | Status: SHIPPED | OUTPATIENT
Start: 2019-08-22 | End: 2020-03-19

## 2019-08-26 DIAGNOSIS — I10 ESSENTIAL HYPERTENSION: ICD-10-CM

## 2019-08-26 RX ORDER — SPIRONOLACTONE 25 MG/1
TABLET ORAL
Qty: 90 TABLET | Refills: 1 | Status: SHIPPED | OUTPATIENT
Start: 2019-08-26 | End: 2019-09-04 | Stop reason: SDUPTHER

## 2019-09-04 ENCOUNTER — OFFICE VISIT (OUTPATIENT)
Dept: FAMILY MEDICINE CLINIC | Facility: CLINIC | Age: 69
End: 2019-09-04

## 2019-09-04 VITALS
HEIGHT: 60 IN | WEIGHT: 164.4 LBS | BODY MASS INDEX: 32.28 KG/M2 | OXYGEN SATURATION: 88 % | DIASTOLIC BLOOD PRESSURE: 60 MMHG | HEART RATE: 88 BPM | SYSTOLIC BLOOD PRESSURE: 124 MMHG

## 2019-09-04 DIAGNOSIS — M47.819 ARTHRITIS, LOW BACK: ICD-10-CM

## 2019-09-04 DIAGNOSIS — F41.9 ANXIETY: ICD-10-CM

## 2019-09-04 DIAGNOSIS — I10 ESSENTIAL HYPERTENSION: Primary | ICD-10-CM

## 2019-09-04 DIAGNOSIS — B35.1 ONYCHOMYCOSIS: ICD-10-CM

## 2019-09-04 DIAGNOSIS — B35.4 TINEA CORPORIS: ICD-10-CM

## 2019-09-04 DIAGNOSIS — F32.9 REACTIVE DEPRESSION: ICD-10-CM

## 2019-09-04 DIAGNOSIS — J41.8 MIXED SIMPLE AND MUCOPURULENT CHRONIC BRONCHITIS (HCC): ICD-10-CM

## 2019-09-04 PROCEDURE — 99214 OFFICE O/P EST MOD 30 MIN: CPT | Performed by: FAMILY MEDICINE

## 2019-09-04 RX ORDER — NYSTATIN 100000 [USP'U]/G
POWDER TOPICAL 3 TIMES DAILY
Qty: 60 G | Refills: 3 | Status: SHIPPED | OUTPATIENT
Start: 2019-09-04 | End: 2020-08-10 | Stop reason: SDUPTHER

## 2019-09-04 RX ORDER — ALPRAZOLAM 1 MG/1
1 TABLET ORAL 3 TIMES DAILY PRN
Qty: 90 TABLET | Refills: 2 | Status: SHIPPED | OUTPATIENT
Start: 2019-09-04 | End: 2019-12-04 | Stop reason: SDUPTHER

## 2019-09-04 RX ORDER — ESCITALOPRAM OXALATE 20 MG/1
20 TABLET ORAL NIGHTLY
Qty: 30 TABLET | Refills: 5 | Status: SHIPPED | OUTPATIENT
Start: 2019-09-04 | End: 2020-03-02 | Stop reason: SDUPTHER

## 2019-09-04 RX ORDER — TERBINAFINE HYDROCHLORIDE 250 MG/1
250 TABLET ORAL DAILY
Qty: 14 TABLET | Refills: 2 | Status: SHIPPED | OUTPATIENT
Start: 2019-09-04 | End: 2020-08-10 | Stop reason: SDUPTHER

## 2019-09-04 RX ORDER — SPIRONOLACTONE 25 MG/1
25 TABLET ORAL 2 TIMES DAILY PRN
Qty: 180 TABLET | Refills: 1 | Status: SHIPPED | OUTPATIENT
Start: 2019-09-04 | End: 2020-02-19

## 2019-09-04 NOTE — PATIENT INSTRUCTIONS
Suspect Essential HTN.Good BP control is encouraged with Goal BP based on JNC 8 guidelines 2014 <140/90 for patients with known cardiac disease and diabetes. (MARQUIS. 2014:322 (5):507-520. doi:10.1001/marquis.2013.76318): general population <60 yr old goal BP <140/90 and for those >60 <150/90.  For patients of all ages with Diabetes, CKD, Known CAD <140/90. Recommended to the patient to obtain electronic home BP machine with upper arm blood pressure cuff and to check regularly as instructed.  Keep BP log and bring to subsequent visits. Stable, at goal.  a. LABS: routine for hypertension recommended and ordered if necessary.  b. Recommend if you do not have a home BP machine to obtain an electronic machine with arm blood pressure cuff.      c. Monitor BP over the next week and keep log to bring back to office. Discussed medication therapy however pt wants to try to control with diet exercise. .  Your provider  has recommended self-monitoring of your blood pressure.  If you do not have a blood pressure cuff you may purchase one from the local pharmacy.  You may ask the pharmacist which brand and model they recommend.  Obtain your blood pressure measurement at least 2x per week.  You should also check your blood pressure if you experience any symptoms of blurred visit, dizziness or headache.  Please record all blood pressure measurements and bring them to next office visit.  If you have any questions about the accuracy of your blood pressure machine please bring it in to the office and our staff will be happy to check accuracy.   d. Encouraged to eat a low sodium heart healthy diet  e. Offered handout on HTN educational topics.  These were provided if patient requested these today.  f. MEDS: as listed in today's visit.  g. Risks/benefits of current and new medications discussed with the patient and or family today.  The patient/family are aware and accept that if there any side effects they should call or return to clinic  as soon as possible.  Appropriate F/U discussed for topics addressed today. All questions were answered to the  satisfactory state of patient/family.  Should symptoms fail to improve or worsen they agree to call or return to clinic or to go to the ER. Education handouts were offered on any new Rx if requested.  Discussed the importance of following up with any needed screening tests/labs/specialist appointments and any requested follow-up recommended by me today.  Importance of maintaining follow-up discussed and patient accepts that missed appointments can delay diagnosis and potentially lead to worsening of conditions.      Chronic Obstructive Pulmonary Disease    Chronic obstructive pulmonary disease (COPD) is a long-term (chronic) condition that affects the lungs. COPD is a general term that can be used to describe many different lung problems that cause lung swelling (inflammation) and limit airflow, including chronic bronchitis and emphysema. If you have COPD, your lung function will probably never return to normal. In most cases, it gets worse over time. However, there are steps you can take to slow the progression of the disease and improve your quality of life.  What are the causes?  This condition may be caused by:  · Smoking. This is the most common cause.  · Certain genes passed down through families.  What increases the risk?  The following factors may make you more likely to develop this condition:  · Secondhand smoke from cigarettes, pipes, or cigars.  · Exposure to chemicals and other irritants such as fumes and dust in the work environment.  · Chronic lung conditions or infections.  What are the signs or symptoms?  Symptoms of this condition include:  · Shortness of breath, especially during physical activity.  · Chronic cough with a large amount of thick mucus. Sometimes the cough may not have any mucus (dry cough).  · Wheezing.  · Rapid breaths.  · Gray or bluish discoloration (cyanosis) of the  skin, especially in your fingers, toes, or lips.  · Feeling tired (fatigue).  · Weight loss.  · Chest tightness.  · Frequent infections.  · Episodes when breathing symptoms become much worse (exacerbations).  · Swelling in the ankles, feet, or legs. This may occur in later stages of the disease.  How is this diagnosed?  This condition is diagnosed based on:  · Your medical history.  · A physical exam.  You may also have tests, including:  · Lung (pulmonary) function tests. This may include a spirometry test, which measures your ability to exhale properly.  · Chest X-ray.  · CT scan.  · Blood tests.  How is this treated?  This condition may be treated with:  · Medicines. These may include inhaled rescue medicines to treat acute exacerbations as well as long-term, or maintenance, medicines to prevent flare-ups of COPD.  ? Bronchodilators help treat COPD by dilating the airways to allow increased airflow and make your breathing more comfortable.  ? Steroids can reduce airway inflammation and help prevent exacerbations.  · Smoking cessation. If you smoke, your health care provider may ask you to quit, and may also recommend therapy or replacement products to help you quit.  · Pulmonary rehabilitation. This may involve working with a team of health care providers and specialists, such as respiratory, occupational, and physical therapists.  · Exercise and physical activity. These are beneficial for nearly all people with COPD.  · Nutrition therapy to gain weight, if you are underweight.  · Oxygen. Supplemental oxygen therapy is only helpful if you have a low oxygen level in your blood (hypoxemia).  · Lung surgery or transplant.  · Palliative care. This is to help people with COPD feel comfortable when treatment is no longer working.  Follow these instructions at home:  Medicines  · Take over-the-counter and prescription medicines (inhaled or pills) only as told by your health care provider.  · Talk to your health care  provider before taking any cough or allergy medicines. You may need to avoid certain medicines that dry out your airways.  Lifestyle  · If you are a smoker, the most important thing that you can do is to stop smoking. Do not use any products that contain nicotine or tobacco, such as cigarettes and e-cigarettes. If you need help quitting, ask your health care provider. Continuing to smoke will cause the disease to progress faster.  · Avoid exposure to things that irritate your lungs, such as smoke, chemicals, and fumes.  · Stay active, but balance activity with periods of rest. Exercise and physical activity will help you maintain your ability to do things you want to do.  · Learn and use relaxation techniques to manage stress and to control your breathing.  · Get the right amount of sleep and get quality sleep. Most adults need 7 or more hours per night.  · Eat healthy foods. Eating smaller, more frequent meals and resting before meals may help you maintain your strength.  Controlled breathing  Learn and use controlled breathing techniques as directed by your health care provider. Controlled breathing techniques include:  · Pursed lip breathing. Start by breathing in (inhaling) through your nose for 1 second. Then, purse your lips as if you were going to whistle and breathe out (exhale) through the pursed lips for 2 seconds.  · Diaphragmatic breathing. Start by putting one hand on your abdomen just above your waist. Inhale slowly through your nose. The hand on your abdomen should move out. Then purse your lips and exhale slowly. You should be able to feel the hand on your abdomen moving in as you exhale.  Controlled coughing  Learn and use controlled coughing to clear mucus from your lungs. Controlled coughing is a series of short, progressive coughs. The steps of controlled coughing are:  1. Lean your head slightly forward.  2. Breathe in deeply using diaphragmatic breathing.  3. Try to hold your breath for 3  seconds.  4. Keep your mouth slightly open while coughing twice.  5. Spit any mucus out into a tissue.  6. Rest and repeat the steps once or twice as needed.  General instructions  · Make sure you receive all the vaccines that your health care provider recommends, especially the pneumococcal and influenza vaccines. Preventing infection and hospitalization is very important when you have COPD.  · Use oxygen therapy and pulmonary rehabilitation if directed to by your health care provider. If you require home oxygen therapy, ask your health care provider whether you should purchase a pulse oximeter to measure your oxygen level at home.  · Work with your health care provider to develop a COPD action plan. This will help you know what steps to take if your condition gets worse.  · Keep other chronic health conditions under control as told by your health care provider.  · Avoid extreme temperature and humidity changes.  · Avoid contact with people who have an illness that spreads from person to person (is contagious), such as viral infections or pneumonia.  · Keep all follow-up visits as told by your health care provider. This is important.  Contact a health care provider if:  · You are coughing up more mucus than usual.  · There is a change in the color or thickness of your mucus.  · Your breathing is more labored than usual.  · Your breathing is faster than usual.  · You have difficulty sleeping.  · You need to use your rescue medicines or inhalers more often than expected.  · You have trouble doing routine activities such as getting dressed or walking around the house.  Get help right away if:  · You have shortness of breath while you are resting.  · You have shortness of breath that prevents you from:  ? Being able to talk.  ? Performing your usual physical activities.  · You have chest pain lasting longer than 5 minutes.  · Your skin color is more blue (cyanotic) than usual.  · You measure low oxygen saturations for  longer than 5 minutes with a pulse oximeter.  · You have a fever.  · You feel too tired to breathe normally.  Summary  · Chronic obstructive pulmonary disease (COPD) is a long-term (chronic) condition that affects the lungs.  · Your lung function will probably never return to normal. In most cases, it gets worse over time. However, there are steps you can take to slow the progression of the disease and improve your quality of life.  · Treatment for COPD may include taking medicines, quitting smoking, pulmonary rehabilitation, and changes to diet and exercise. As the disease progresses, you may need oxygen therapy, a lung transplant, or palliative care.  · To help manage your condition, do not smoke, avoid exposure to things that irritate your lungs, stay up to date on all vaccines, and follow your health care provider's instructions for taking medicines.  This information is not intended to replace advice given to you by your health care provider. Make sure you discuss any questions you have with your health care provider.  Document Released: 09/27/2006 Document Revised: 06/13/2018 Document Reviewed: 01/22/2018  [email or web address] Interactive Patient Education © 2019 [email or web address]

## 2019-09-04 NOTE — PROGRESS NOTES
OFFICE VISIT NOTE:    Shanell Coffman is a 68 y.o. female who presents today for Nail Problem and med refills.     Seems to be falling lately - about a month ago fell and went to [employer], and sustained a fracture of the right wrist, and again this past Sunday with fracture of the left 5th distal finger.   Her BP has been doing GOOD!!!!! No hypotensive episodes noted.   Is on home oxygen routinely, but sometimes doesn't wear it on occasion, and seems to be lower sats then -  seems to think it may be a cause of her falls. Occasionally the sats are 82% at home, even being on O2.  Does have some left 1st and 2nd toenail fungal involvement - she wants it taken off at some point.   Also, needs refills for several meds for chronic stable conditions.           Past medical/surgical history, Family history, Social history, Allergies and Medications have been reviewed with the patient today and are updated in [facility] EMR. See below.    Past Medical History:   Diagnosis Date   • Anxiety    • Chronic pain    • COPD (chronic obstructive pulmonary disease) (CMS/HCC)    • GERD (gastroesophageal reflux disease)    • Hyperlipidemia      Past Surgical History:   Procedure Laterality Date   • APPENDECTOMY     • CHOLECYSTECTOMY     • COLON SURGERY     • TUBAL ABDOMINAL LIGATION       Family History   Problem Relation Age of Onset   • Heart disease Mother    • Hyperlipidemia Mother    • Hypertension Mother    • Cancer Father    • Mental illness Father    • Diabetes Maternal Grandmother    • Heart attack Maternal Grandfather    • Obesity Paternal Grandmother    • Stroke Paternal Grandfather      Social History     Tobacco Use   • Smoking status: Never Smoker   • Smokeless tobacco: Never Used   Substance Use Topics   • Alcohol use: No     Frequency: Never     Binge frequency: Never   • Drug use: No       Allergies:  Ceclor [cefaclor]; Keflex [cephalexin]; Stadol [butorphanol]; and Seroquel [quetiapine]    Current Meds:    Current  Outpatient Medications:   •  albuterol (PROVENTIL) (2.5 MG/3ML) 0.083% nebulizer solution, USE 3 ML PER NEBULIZER 4 TIMES A DAY, Disp: 375 mL, Rfl: 0  •  albuterol sulfate  (90 Base) MCG/ACT inhaler, Inhale 2 puffs Every 4 (Four) Hours As Needed for Wheezing or Shortness of Air., Disp: 18 g, Rfl: 5  •  ALPRAZolam (XANAX) 1 MG tablet, Take 1 tablet by mouth 3 (Three) Times a Day As Needed for Anxiety., Disp: 90 tablet, Rfl: 2  •  amLODIPine (NORVASC) 10 MG tablet, Take 1 tablet by mouth Daily., Disp: 90 tablet, Rfl: 1  •  baclofen (LIORESAL) 10 MG tablet, Take 1 tablet by mouth Daily., Disp: 30 tablet, Rfl: 2  •  Calcium Carbonate-Vitamin D (OYSTER SHELL CALCIUM 500 + D) 500-125 MG-UNIT tablet, TAKE 1 TABLET BY MOUTH WITH FOOD, Disp: 90 each, Rfl: 2  •  calcium-vitamin D (OSCAL 500/200 D-3) 500-200 MG-UNIT per tablet, Take 1 tablet by mouth Daily., Disp: 30 tablet, Rfl: 11  •  CloNIDine (CATAPRES) 0.2 MG tablet, Take 1 tablet by mouth 2 (Two) Times a Day. Begin evening of 4/22/19, and DC patches, Disp: 60 tablet, Rfl: 5  •  dicyclomine (BENTYL) 20 MG tablet, TAKE 1 TABLET BY MOUTH 4 TIMES A DAY PRB, Disp: , Rfl: 2  •  diltiaZEM (TIAZAC) 360 MG 24 hr capsule, TAKE 1 CAPSULE BY MOUTH EVERY DAY, Disp: 90 capsule, Rfl: 1  •  doxepin (SINEquan) 50 MG capsule, Take 1 capsule by mouth Every Night., Disp: 30 capsule, Rfl: 5  •  escitalopram (LEXAPRO) 20 MG tablet, Take 1 tablet by mouth Every Night., Disp: 30 tablet, Rfl: 5  •  furosemide (LASIX) 40 MG tablet, Take 1 tablet by mouth Daily., Disp: 30 tablet, Rfl: 5  •  ketoconazole (NIZORAL) 2 % cream, Apply  topically to the appropriate area as directed Daily., Disp: 60 g, Rfl: 5  •  losartan (COZAAR) 50 MG tablet, Take 100 mg by mouth Daily., Disp: , Rfl:   •  metoclopramide (REGLAN) 5 MG tablet, TAKE 1 TABLET BY MOUTH 4 TIMES A DAY AS NEEDED, Disp: 120 tablet, Rfl: 5  •  metoprolol succinate XL (TOPROL-XL) 25 MG 24 hr tablet, Take 1 tablet by mouth Daily., Disp: 30  "tablet, Rfl: 5  •  mirtazapine (REMERON) 15 MG tablet, Take 15 mg by mouth every night at bedtime., Disp: , Rfl: 3  •  nystatin (MYCOSTATIN) 737708 UNIT/GM powder, Apply  topically to the appropriate area as directed 3 (Three) Times a Day., Disp: 60 g, Rfl: 3  •  O2 (OXYGEN), Inhale 2 L/min 1 (One) Time., Disp: , Rfl:   •  oxyCODONE-acetaminophen (PERCOCET) 7.5-325 MG per tablet, Take 1 tablet by mouth 4 (Four) Times a Day., Disp: , Rfl: 0  •  pantoprazole (PROTONIX) 40 MG EC tablet, Take 40 mg by mouth Daily., Disp: , Rfl:   •  potassium chloride ER (K-TAB) 20 MEQ tablet controlled-release ER tablet, Take 1 tablet by mouth Daily., Disp: 90 tablet, Rfl: 1  •  promethazine (PHENERGAN) 25 MG tablet, TAKE 1 TABLET(S) BY MOUTH DAILY AS NEEDED NAUSEA/VOMITING, Disp: , Rfl: 5  •  spironolactone (ALDACTONE) 25 MG tablet, Take 1 tablet by mouth 2 (Two) Times a Day As Needed (swelling or elevated BP)., Disp: 180 tablet, Rfl: 1  •  terbinafine (LAMISIL) 250 MG tablet, Take 1 tablet by mouth Daily., Disp: 14 tablet, Rfl: 2    Review of Systems:  Review of Systems    Physical Examination:  Vital Signs:  /60 (BP Location: Left arm, Patient Position: Sitting, Cuff Size: Adult)   Pulse 88   Ht 152.4 cm (60\")   Wt 74.6 kg (164 lb 6.4 oz)   LMP  (LMP Unknown)   SpO2 (!) 88% Comment: on 2 LPM  Breastfeeding? No   BMI 32.11 kg/m²   Physical Exam    Procedures    ASSESSMENT/ PLAN:        Problem List Items Addressed This Visit        Cardiovascular and Mediastinum    Essential hypertension - Primary    Relevant Medications    spironolactone (ALDACTONE) 25 MG tablet       Respiratory    Mixed simple and mucopurulent chronic bronchitis (CMS/HCC)       Musculoskeletal and Integument    Arthritis, low back (CMS/HCC)    Tinea corporis    Relevant Medications    terbinafine (LAMISIL) 250 MG tablet    nystatin (MYCOSTATIN) 546074 UNIT/GM powder    Onychomycosis    Relevant Medications    terbinafine (LAMISIL) 250 MG tablet       " Other    Anxiety    Relevant Medications    ALPRAZolam (XANAX) 1 MG tablet    Reactive depression    Relevant Medications    ALPRAZolam (XANAX) 1 MG tablet    escitalopram (LEXAPRO) 20 MG tablet                   Specific Patient Instructions:  MEDICATION Instructions: Encouraged patient to continue routine medicines as prescribed and maintain compliance. Patient instructed to report any adverse side effects or reactions to medicines promptly to the office. Patient instructed to make us aware of any OTC or herbal meds or supplement use.  DIET Recommendations: No new recommendations regarding diet/restrictions.  EXERCISE Instructions: No new recommendations.    Patient's Body mass index is 32.11 kg/m². BMI is above normal parameters. Recommendations include: exercise counseling and nutrition counseling.      SMOKING Recommendations: N/A  HEALTH MAINTENANCE:  N/A  MISCELLANEOUS Instructions: N/A      Medications ordered or changed this visit:  New Medications Ordered This Visit   Medications   • ALPRAZolam (XANAX) 1 MG tablet     Sig: Take 1 tablet by mouth 3 (Three) Times a Day As Needed for Anxiety.     Dispense:  90 tablet     Refill:  2   • terbinafine (LAMISIL) 250 MG tablet     Sig: Take 1 tablet by mouth Daily.     Dispense:  14 tablet     Refill:  2   • nystatin (MYCOSTATIN) 280347 UNIT/GM powder     Sig: Apply  topically to the appropriate area as directed 3 (Three) Times a Day.     Dispense:  60 g     Refill:  3   • escitalopram (LEXAPRO) 20 MG tablet     Sig: Take 1 tablet by mouth Every Night.     Dispense:  30 tablet     Refill:  5   • spironolactone (ALDACTONE) 25 MG tablet     Sig: Take 1 tablet by mouth 2 (Two) Times a Day As Needed (swelling or elevated BP).     Dispense:  180 tablet     Refill:  1        FOLLOW-UP:  Return in about 3 months (around 12/4/2019) for Recheck.    I discussed the patients findings and my recommendations with patient.  An After Visit Summary (AVS) was printed and given to  the patient at discharge.      Reji Jacobs MD, FAAFP  9/4/2019

## 2019-09-18 ENCOUNTER — OFFICE VISIT (OUTPATIENT)
Dept: FAMILY MEDICINE CLINIC | Facility: CLINIC | Age: 69
End: 2019-09-18

## 2019-09-18 VITALS
HEART RATE: 88 BPM | DIASTOLIC BLOOD PRESSURE: 72 MMHG | OXYGEN SATURATION: 94 % | BODY MASS INDEX: 32.71 KG/M2 | HEIGHT: 60 IN | WEIGHT: 166.6 LBS | SYSTOLIC BLOOD PRESSURE: 144 MMHG

## 2019-09-18 DIAGNOSIS — I10 ESSENTIAL HYPERTENSION: ICD-10-CM

## 2019-09-18 DIAGNOSIS — J01.00 ACUTE NON-RECURRENT MAXILLARY SINUSITIS: Primary | ICD-10-CM

## 2019-09-18 DIAGNOSIS — J40 BRONCHITIS: ICD-10-CM

## 2019-09-18 DIAGNOSIS — J43.1 PANLOBULAR EMPHYSEMA (HCC): ICD-10-CM

## 2019-09-18 PROCEDURE — 99214 OFFICE O/P EST MOD 30 MIN: CPT | Performed by: FAMILY MEDICINE

## 2019-09-18 RX ORDER — METHYLPREDNISOLONE ACETATE 80 MG/ML
80 INJECTION, SUSPENSION INTRA-ARTICULAR; INTRALESIONAL; INTRAMUSCULAR; SOFT TISSUE ONCE
Status: DISCONTINUED | OUTPATIENT
Start: 2019-09-18 | End: 2019-10-25

## 2019-09-18 RX ORDER — AMOXICILLIN AND CLAVULANATE POTASSIUM 875; 125 MG/1; MG/1
1 TABLET, FILM COATED ORAL 2 TIMES DAILY
Qty: 20 TABLET | Refills: 0 | Status: SHIPPED | OUTPATIENT
Start: 2019-09-18 | End: 2019-10-18

## 2019-09-18 RX ORDER — METHYLPREDNISOLONE 4 MG/1
TABLET ORAL
Qty: 21 EACH | Refills: 0 | Status: SHIPPED | OUTPATIENT
Start: 2019-09-18 | End: 2019-10-18

## 2019-09-18 RX ORDER — EPINEPHRINE 0.3 MG/.3ML
0.3 INJECTION SUBCUTANEOUS ONCE
Qty: 1 EACH | Refills: 1 | Status: SHIPPED | OUTPATIENT
Start: 2019-09-18 | End: 2019-09-18

## 2019-09-18 RX ORDER — FEXOFENADINE HCL AND PSEUDOEPHEDRINE HCI 60; 120 MG/1; MG/1
1 TABLET, EXTENDED RELEASE ORAL 2 TIMES DAILY PRN
Qty: 30 TABLET | Refills: 2 | Status: SHIPPED | OUTPATIENT
Start: 2019-09-18 | End: 2020-10-16

## 2019-09-18 NOTE — PROGRESS NOTES
OFFICE VISIT NOTE:    Shanell Coffmna is a 68 y.o. female who presents today for URI (x 5 days); Shortness of Breath; and Wheezing.     Is satisfied with her BP lately - some green cough lately, and sinus congestion. No fever. Taking Mucinex DM since Saturday - not really helping.   O2 sats running around 83-85% despite being on O2, upped her level to 3LPM over the weekend due to this.       URI    This is a new problem. The current episode started in the past 7 days. The problem has been gradually worsening. There has been no fever. Associated symptoms include congestion, coughing (NP), ear pain (L>R; feels full), a plugged ear sensation, rhinorrhea, sinus pain, a sore throat and wheezing. Pertinent negatives include no abdominal pain, chest pain, headaches or rash. She has tried acetaminophen, increased fluids and inhaler use for the symptoms. The treatment provided mild relief.   Shortness of Breath   This is a new problem. The current episode started in the past 7 days. The problem occurs daily. The problem has been gradually worsening. Associated symptoms include ear pain (L>R; feels full), rhinorrhea, a sore throat and wheezing. Pertinent negatives include no abdominal pain, chest pain, fever, headaches, hemoptysis, rash or sputum production. The symptoms are aggravated by exercise, any activity and odors. She has tried beta agonist inhalers, body position changes, cool air and OTC cough suppressants for the symptoms. The treatment provided moderate relief. Her past medical history is significant for allergies and COPD.   Wheezing    This is a new problem. The current episode started in the past 7 days. The problem occurs daily. The problem has been gradually worsening. Associated symptoms include coughing (NP), ear pain (L>R; feels full), rhinorrhea, shortness of breath and a sore throat. Pertinent negatives include no abdominal pain, chest pain, fever, headaches, hemoptysis, rash or sputum production. The  treatment provided moderate relief. Her past medical history is significant for COPD.        Past medical/surgical history, Family history, Social history, Allergies and Medications have been reviewed with the patient today and are updated in [facility] EMR. See below.    Past Medical History:   Diagnosis Date   • Anxiety    • Chronic pain    • COPD (chronic obstructive pulmonary disease) (CMS/HCC)    • GERD (gastroesophageal reflux disease)    • Hyperlipidemia      Past Surgical History:   Procedure Laterality Date   • APPENDECTOMY     • CHOLECYSTECTOMY     • COLON SURGERY     • TUBAL ABDOMINAL LIGATION       Family History   Problem Relation Age of Onset   • Heart disease Mother    • Hyperlipidemia Mother    • Hypertension Mother    • Cancer Father    • Mental illness Father    • Diabetes Maternal Grandmother    • Heart attack Maternal Grandfather    • Obesity Paternal Grandmother    • Stroke Paternal Grandfather      Social History     Tobacco Use   • Smoking status: Never Smoker   • Smokeless tobacco: Never Used   Substance Use Topics   • Alcohol use: No     Frequency: Never     Binge frequency: Never   • Drug use: No       Allergies:  Ceclor [cefaclor]; Keflex [cephalexin]; Stadol [butorphanol]; and Seroquel [quetiapine]    Current Meds:    Current Outpatient Medications:   •  albuterol (PROVENTIL) (2.5 MG/3ML) 0.083% nebulizer solution, USE 3 ML PER NEBULIZER 4 TIMES A DAY, Disp: 375 mL, Rfl: 0  •  albuterol sulfate  (90 Base) MCG/ACT inhaler, Inhale 2 puffs Every 4 (Four) Hours As Needed for Wheezing or Shortness of Air., Disp: 18 g, Rfl: 5  •  ALPRAZolam (XANAX) 1 MG tablet, Take 1 tablet by mouth 3 (Three) Times a Day As Needed for Anxiety., Disp: 90 tablet, Rfl: 2  •  amLODIPine (NORVASC) 10 MG tablet, Take 1 tablet by mouth Daily., Disp: 90 tablet, Rfl: 1  •  baclofen (LIORESAL) 10 MG tablet, Take 1 tablet by mouth Daily., Disp: 30 tablet, Rfl: 2  •  Calcium Carbonate-Vitamin D (OYSTER SHELL CALCIUM 500  + D) 500-125 MG-UNIT tablet, TAKE 1 TABLET BY MOUTH WITH FOOD, Disp: 90 each, Rfl: 2  •  calcium-vitamin D (OSCAL 500/200 D-3) 500-200 MG-UNIT per tablet, Take 1 tablet by mouth Daily., Disp: 30 tablet, Rfl: 11  •  CloNIDine (CATAPRES) 0.2 MG tablet, Take 1 tablet by mouth 2 (Two) Times a Day. Begin evening of 4/22/19, and DC patches, Disp: 60 tablet, Rfl: 5  •  Dextromethorphan-guaiFENesin (MUCINEX DM PO), Take 1 tablet by mouth 2 (Two) Times a Day., Disp: , Rfl:   •  dicyclomine (BENTYL) 20 MG tablet, TAKE 1 TABLET BY MOUTH 4 TIMES A DAY PRB, Disp: , Rfl: 2  •  diltiaZEM (TIAZAC) 360 MG 24 hr capsule, TAKE 1 CAPSULE BY MOUTH EVERY DAY, Disp: 90 capsule, Rfl: 1  •  doxepin (SINEquan) 50 MG capsule, Take 1 capsule by mouth Every Night., Disp: 30 capsule, Rfl: 5  •  escitalopram (LEXAPRO) 20 MG tablet, Take 1 tablet by mouth Every Night., Disp: 30 tablet, Rfl: 5  •  furosemide (LASIX) 40 MG tablet, Take 1 tablet by mouth Daily., Disp: 30 tablet, Rfl: 5  •  ketoconazole (NIZORAL) 2 % cream, Apply  topically to the appropriate area as directed Daily., Disp: 60 g, Rfl: 5  •  losartan (COZAAR) 50 MG tablet, Take 100 mg by mouth Daily., Disp: , Rfl:   •  metoclopramide (REGLAN) 5 MG tablet, TAKE 1 TABLET BY MOUTH 4 TIMES A DAY AS NEEDED, Disp: 120 tablet, Rfl: 5  •  metoprolol succinate XL (TOPROL-XL) 25 MG 24 hr tablet, Take 1 tablet by mouth Daily., Disp: 30 tablet, Rfl: 5  •  mirtazapine (REMERON) 15 MG tablet, Take 15 mg by mouth every night at bedtime., Disp: , Rfl: 3  •  nystatin (MYCOSTATIN) 576702 UNIT/GM powder, Apply  topically to the appropriate area as directed 3 (Three) Times a Day., Disp: 60 g, Rfl: 3  •  O2 (OXYGEN), Inhale 2 L/min 1 (One) Time., Disp: , Rfl:   •  oxyCODONE-acetaminophen (PERCOCET) 7.5-325 MG per tablet, Take 1 tablet by mouth 4 (Four) Times a Day., Disp: , Rfl: 0  •  pantoprazole (PROTONIX) 40 MG EC tablet, Take 40 mg by mouth Daily., Disp: , Rfl:   •  potassium chloride ER (K-TAB) 20 MEQ  "tablet controlled-release ER tablet, Take 1 tablet by mouth Daily., Disp: 90 tablet, Rfl: 1  •  promethazine (PHENERGAN) 25 MG tablet, TAKE 1 TABLET(S) BY MOUTH DAILY AS NEEDED NAUSEA/VOMITING, Disp: , Rfl: 5  •  spironolactone (ALDACTONE) 25 MG tablet, Take 1 tablet by mouth 2 (Two) Times a Day As Needed (swelling or elevated BP)., Disp: 180 tablet, Rfl: 1  •  terbinafine (LAMISIL) 250 MG tablet, Take 1 tablet by mouth Daily., Disp: 14 tablet, Rfl: 2  •  amoxicillin-clavulanate (AUGMENTIN) 875-125 MG per tablet, Take 1 tablet by mouth 2 (Two) Times a Day., Disp: 20 tablet, Rfl: 0  •  fexofenadine-pseudoephedrine (ALLEGRA-D)  MG per 12 hr tablet, Take 1 tablet by mouth 2 (Two) Times a Day As Needed for Allergies (sinus congestion)., Disp: 30 tablet, Rfl: 2  •  methylPREDNISolone (MEDROL, ANNA,) 4 MG tablet, Take as directed on package instructions., Disp: 21 each, Rfl: 0    Current Facility-Administered Medications:   •  methylPREDNISolone acetate (DEPO-medrol) injection 80 mg, 80 mg, Intramuscular, Once, Reji Jacobs MD    Review of Systems:  Review of Systems   Constitutional: Negative for activity change, fatigue, fever, unexpected weight gain and unexpected weight loss.   HENT: Positive for congestion, ear pain (L>R; feels full), rhinorrhea and sore throat.    Respiratory: Positive for cough (NP), shortness of breath and wheezing. Negative for hemoptysis and sputum production.    Cardiovascular: Negative for chest pain.   Gastrointestinal: Negative for abdominal pain.   Genitourinary: Negative for difficulty urinating.   Skin: Negative for rash.   Neurological: Negative for syncope and headache.       Physical Examination:  Vital Signs:  /72 (BP Location: Left arm, Patient Position: Sitting, Cuff Size: Adult)   Pulse 88   Ht 152.4 cm (60\")   Wt 75.6 kg (166 lb 9.6 oz)   LMP  (LMP Unknown)   SpO2 94%   Breastfeeding? No   BMI 32.54 kg/m²   Physical Exam   Constitutional: She is oriented " to person, place, and time. She appears well-developed and well-nourished. No distress.   HENT:   Head: Normocephalic and atraumatic.   Right Ear: A middle ear effusion is present.   Left Ear: A middle ear effusion is present.   Nose: Mucosal edema and rhinorrhea present. Right sinus exhibits maxillary sinus tenderness. Left sinus exhibits maxillary sinus tenderness.   Mouth/Throat: Mucous membranes are normal. Posterior oropharyngeal edema and posterior oropharyngeal erythema present. No oropharyngeal exudate.   Neck: Normal range of motion. Neck supple. No JVD present.   Cardiovascular: Normal rate, regular rhythm, normal heart sounds and intact distal pulses.   Pulmonary/Chest: Effort normal. No respiratory distress. She has wheezes.   Musculoskeletal: Normal range of motion. She exhibits no edema.   Neurological: She is alert and oriented to person, place, and time. No cranial nerve deficit.   Skin: Skin is warm and dry. Capillary refill takes less than 2 seconds. No rash noted.   Psychiatric: She has a normal mood and affect. Her behavior is normal.   Nursing note and vitals reviewed.      Procedures    ASSESSMENT/ PLAN:        Problem List Items Addressed This Visit        Cardiovascular and Mediastinum    Essential hypertension       Respiratory    Panlobular emphysema (CMS/HCC)    Relevant Medications    Dextromethorphan-guaiFENesin (MUCINEX DM PO)    fexofenadine-pseudoephedrine (ALLEGRA-D)  MG per 12 hr tablet    methylPREDNISolone (MEDROL, ANNA,) 4 MG tablet      Other Visit Diagnoses     Acute non-recurrent maxillary sinusitis    -  Primary    Relevant Medications    methylPREDNISolone acetate (DEPO-medrol) injection 80 mg (Start on 9/18/2019 11:05 AM)    fexofenadine-pseudoephedrine (ALLEGRA-D)  MG per 12 hr tablet    amoxicillin-clavulanate (AUGMENTIN) 875-125 MG per tablet    methylPREDNISolone (MEDROL, ANNA,) 4 MG tablet    Bronchitis        Relevant Medications     Dextromethorphan-guaiFENesin (MUCINEX DM PO)    methylPREDNISolone acetate (DEPO-medrol) injection 80 mg (Start on 9/18/2019 11:05 AM)    fexofenadine-pseudoephedrine (ALLEGRA-D)  MG per 12 hr tablet    amoxicillin-clavulanate (AUGMENTIN) 875-125 MG per tablet    methylPREDNISolone (MEDROL, ANNA,) 4 MG tablet                   Specific Patient Instructions:  MEDICATION Instructions: Encouraged patient to continue routine medicines as prescribed and maintain compliance. Patient instructed to report any adverse side effects or reactions to medicines promptly to the office. Patient instructed to make us aware of any OTC or herbal meds or supplement use.  DIET Recommendations: No new recommendations regarding diet/restrictions.  EXERCISE Instructions: No new recommendations.    Patient's Body mass index is 32.54 kg/m². BMI is above normal parameters. Recommendations include: exercise counseling and nutrition counseling.      SMOKING Recommendations: N/A  HEALTH MAINTENANCE:  N/A  MISCELLANEOUS Instructions: N/A      Medications ordered or changed this visit:  New Medications Ordered This Visit   Medications   • methylPREDNISolone acetate (DEPO-medrol) injection 80 mg   • fexofenadine-pseudoephedrine (ALLEGRA-D)  MG per 12 hr tablet     Sig: Take 1 tablet by mouth 2 (Two) Times a Day As Needed for Allergies (sinus congestion).     Dispense:  30 tablet     Refill:  2   • amoxicillin-clavulanate (AUGMENTIN) 875-125 MG per tablet     Sig: Take 1 tablet by mouth 2 (Two) Times a Day.     Dispense:  20 tablet     Refill:  0   • methylPREDNISolone (MEDROL, ANNA,) 4 MG tablet     Sig: Take as directed on package instructions.     Dispense:  21 each     Refill:  0        FOLLOW-UP:  Return in about 3 weeks (around 10/9/2019) for Recheck.    I discussed the patients findings and my recommendations with patient.  An After Visit Summary (AVS) was printed and given to the patient at discharge.      Reji Jacobs MD,  FAAFP  9/18/2019

## 2019-09-18 NOTE — PATIENT INSTRUCTIONS
Sinusitis, Adult  Sinusitis is soreness and inflammation of your sinuses. Sinuses are hollow spaces in the bones around your face. Your sinuses are located:  · Around your eyes.  · In the middle of your forehead.  · Behind your nose.  · In your cheekbones.  Your sinuses and nasal passages are lined with a stringy fluid (mucus). Mucus normally drains out of your sinuses. When your nasal tissues become inflamed or swollen, mucus can become trapped or blocked. Blocked or trapped mucus makes it difficult for air to flow through your sinuses. This allows bacteria, viruses, and funguses to grow, which leads to infection. Most infections of the sinuses are caused by a virus.  Sinusitis can develop quickly. It can last for 7?10 days (acute) or for more than 12 weeks (chronic). Sinusitis often develops after a cold.  What are the causes?  This condition is caused by anything that creates swelling in the sinuses or stops mucus from draining, including:  · Allergies.  · Asthma.  · Bacterial or viral infection.  · Abnormally shaped bones between the nasal passages.  · Nasal growths that contain mucus (nasal polyps).  · Narrow sinus openings.  · Pollutants, such as chemicals or irritants in the air.  · A foreign object stuck in the nose.  · A fungal infection. This is rare.  What increases the risk?  The following factors may make you more likely to develop this condition:  · Having allergies or asthma.  · Having had a recent cold or respiratory tract infection.  · Having structural deformities or blockages in your nose or sinuses.  · Having a weak immune system.  · Doing a lot of swimming or diving.  · Overusing nasal sprays.  · Smoking.  What are the signs or symptoms?  The main symptoms of this condition are pain and a feeling of pressure around the affected sinuses. Other symptoms include:  · Upper toothache.  · Earache.  · Headache.  · Bad breath.  · Decreased sense of smell and taste.  · A cough that may get worse at  night.  · Fatigue.  · Fever.  · Thick drainage from your nose. The drainage is often green and it may contain pus (purulent).  · Stuffy nose or congestion.  · Postnasal drip. This is when extra mucus collects in the throat or back of the nose.  · Swelling and warmth over the affected sinuses.  · Sore throat.  · Sensitivity to light.  How is this diagnosed?  This condition is diagnosed based on symptoms, a medical history, and a physical exam. To find out if your condition is acute or chronic, your health care provider may:  · Look in your nose for signs of nasal polyps.  · Tap over the affected sinus to check for signs of infection.  · View the inside of your sinuses using an imaging device that has a light attached (endoscope).  If your health care provider suspects that you have chronic sinusitis, you may also:  · Be tested for allergies.  · Have a sample of mucus taken from your nose (nasal culture) and checked for bacteria.  · Have a mucus sample examined to see if your sinusitis is related to an allergy.  If your sinusitis does not respond to treatment and it lasts longer than 8 weeks, you may have an MRI or CT scan to check your sinuses. These scans also help to determine how severe your infection is.  In rare cases, a bone biopsy may be done to rule out more serious types of fungal sinus disease.  How is this treated?  Treatment for sinusitis depends on the cause and whether your condition is chronic or acute. If a virus is causing your sinusitis, your symptoms will go away on their own within 10 days. You may be given medicines to relieve your symptoms, including:  · Topical nasal decongestants. They shrink swollen nasal passages and let mucus drain from your sinuses.  · Antihistamines. These drugs block inflammation that is triggered by allergies. This can help to ease swelling in your nose and sinuses.  · Topical nasal corticosteroids. These are nasal sprays that ease inflammation and swelling in your nose  and sinuses.  · Nasal saline washes. These rinses can help to get rid of thick mucus in your nose.  If your condition is caused by bacteria, your health care provider may recommend waiting to see if your symptoms improve. Most bacterial infections will get better without antibiotic medicine. If you have a severe infection or a weak immune system, you may be prescribed antibiotics.  Surgery may be needed to correct underlying conditions, such as narrow nasal passages. Surgery may also be needed to remove polyps.  Follow these instructions at home:  Medicines  · Take, use, or apply over-the-counter and prescription medicines only as told by your health care provider. These may include nasal sprays.  · If you were prescribed an antibiotic, take it as told by your health care provider. Do not stop taking the antibiotic even if you start to feel better.  Hydrate and Humidify  · Drink enough water to keep your urine clear or pale yellow. Staying hydrated will help to thin your mucus.  · Use a cool mist humidifier to keep the humidity level in your home above 50%.  · Inhale steam for 10-15 minutes, 3-4 times a day or as told by your health care provider. You can do this in the bathroom while a hot shower is running.  · Limit your exposure to cool or dry air.  Rest  · Rest as much as possible.  · Sleep with your head raised (elevated).  · Make sure to get enough sleep each night.  General instructions  · Apply a warm, moist washcloth to your face 3-4 times a day or as told by your health care provider. This will help with discomfort.  · Wash your hands often with soap and water to reduce your exposure to viruses and other germs. If soap and water are not available, use hand .  · Do not smoke. Avoid being around people who are smoking (secondhand smoke).  · Keep all follow-up visits as told by your health care provider. This is important.  Contact a health care provider if:  · You have a fever.  · Your symptoms get  worse.  · Your symptoms do not improve within 10 days.  Get help right away if:  · You have a severe headache.  · You have persistent vomiting.  · You have pain or swelling around your face or eyes.  · You have vision problems.  · You develop confusion.  · Your neck is stiff.  · You have trouble breathing.  This information is not intended to replace advice given to you by your health care provider. Make sure you discuss any questions you have with your health care provider.  Document Released: 12/18/2006 Document Revised: 06/28/2018 Document Reviewed: 10/12/2016  [employer] Patient Education © 2019 [employer]      Acute Bronchitis, Adult  Acute bronchitis is when air tubes (bronchi) in the lungs suddenly get swollen. The condition can make it hard to breathe. It can also cause these symptoms:  · A cough.  · Coughing up clear, yellow, or green mucus.  · Wheezing.  · Chest congestion.  · Shortness of breath.  · A fever.  · Body aches.  · Chills.  · A sore throat.  Follow these instructions at home:    Medicines  · Take over-the-counter and prescription medicines only as told by your doctor.  · If you were prescribed an antibiotic medicine, take it as told by your doctor. Do not stop taking the antibiotic even if you start to feel better.  General instructions  · Rest.  · Drink enough fluids to keep your pee (urine) pale yellow.  · Avoid smoking and secondhand smoke. If you smoke and you need help quitting, ask your doctor. Quitting will help your lungs heal faster.  · Use an inhaler, cool mist vaporizer, or humidifier as told by your doctor.  · Keep all follow-up visits as told by your doctor. This is important.  How is this prevented?  To lower your risk of getting this condition again:  · Wash your hands often with soap and water. If you cannot use soap and water, use hand .  · Avoid contact with people who have cold symptoms.  · Try not to touch your hands to your mouth, nose, or  eyes.  · Make sure to get the flu shot every year.  Contact a doctor if:  · Your symptoms do not get better in 2 weeks.  Get help right away if:  · You cough up blood.  · You have chest pain.  · You have very bad shortness of breath.  · You become dehydrated.  · You faint (pass out) or keep feeling like you are going to pass out.  · You keep throwing up (vomiting).  · You have a very bad headache.  · Your fever or chills gets worse.  This information is not intended to replace advice given to you by your health care provider. Make sure you discuss any questions you have with your health care provider.  Document Released: 06/05/2009 Document Revised: 08/01/2018 Document Reviewed: 06/07/2017  [employer] Patient Education © 2019 [employer]

## 2019-09-20 ENCOUNTER — TELEPHONE (OUTPATIENT)
Dept: FAMILY MEDICINE CLINIC | Facility: CLINIC | Age: 69
End: 2019-09-20

## 2019-09-20 NOTE — TELEPHONE ENCOUNTER
"Pt called requesting letter from Dr. Jacobs. She advised that she had trip planned for this weekend to go to [place], however due to sickness, she cannot make the trip. She stated the only way to receive a refund for the trip, is to send a letter to \"[employer].\" She is asking for a letter to be written by Dr. Jacobs, advising that she could not make trip due to health reasons.   "

## 2019-09-24 RX ORDER — QUETIAPINE FUMARATE 100 MG/1
TABLET, FILM COATED ORAL
Qty: 90 TABLET | Refills: 1 | OUTPATIENT
Start: 2019-09-24

## 2019-09-26 NOTE — TELEPHONE ENCOUNTER
"Pt called concerning \"hacking cough\" she has. She is asking if cough syrup can be called in to [facility] for her? Pt last seen in office 9/18/19.  "

## 2019-09-27 RX ORDER — DEXTROMETHORPHAN HYDROBROMIDE AND PROMETHAZINE HYDROCHLORIDE 15; 6.25 MG/5ML; MG/5ML
5 SYRUP ORAL 4 TIMES DAILY PRN
Qty: 240 ML | Refills: 1 | Status: SHIPPED | OUTPATIENT
Start: 2019-09-27 | End: 2019-10-18

## 2019-10-07 DIAGNOSIS — J43.1 PANLOBULAR EMPHYSEMA (HCC): ICD-10-CM

## 2019-10-07 RX ORDER — ALBUTEROL SULFATE 90 UG/1
AEROSOL, METERED RESPIRATORY (INHALATION)
Qty: 18 INHALER | Refills: 5 | Status: SHIPPED | OUTPATIENT
Start: 2019-10-07 | End: 2020-10-16

## 2019-10-08 DIAGNOSIS — I10 ESSENTIAL HYPERTENSION: ICD-10-CM

## 2019-10-08 RX ORDER — CLONIDINE HYDROCHLORIDE 0.2 MG/1
0.2 TABLET ORAL 2 TIMES DAILY
Qty: 180 TABLET | Refills: 1 | Status: SHIPPED | OUTPATIENT
Start: 2019-10-08 | End: 2020-10-16

## 2019-10-18 ENCOUNTER — PROCEDURE VISIT (OUTPATIENT)
Dept: FAMILY MEDICINE CLINIC | Facility: CLINIC | Age: 69
End: 2019-10-18

## 2019-10-18 VITALS
HEIGHT: 60 IN | WEIGHT: 164.9 LBS | HEART RATE: 74 BPM | DIASTOLIC BLOOD PRESSURE: 54 MMHG | BODY MASS INDEX: 32.38 KG/M2 | OXYGEN SATURATION: 97 % | SYSTOLIC BLOOD PRESSURE: 150 MMHG

## 2019-10-18 DIAGNOSIS — Z23 NEED FOR IMMUNIZATION AGAINST INFLUENZA: Primary | ICD-10-CM

## 2019-10-18 DIAGNOSIS — J43.1 PANLOBULAR EMPHYSEMA (HCC): ICD-10-CM

## 2019-10-18 DIAGNOSIS — F51.01 PRIMARY INSOMNIA: ICD-10-CM

## 2019-10-18 DIAGNOSIS — J20.9 ACUTE BRONCHITIS, UNSPECIFIED ORGANISM: ICD-10-CM

## 2019-10-18 DIAGNOSIS — R05.9 COUGH: ICD-10-CM

## 2019-10-18 PROCEDURE — 90674 CCIIV4 VAC NO PRSV 0.5 ML IM: CPT | Performed by: FAMILY MEDICINE

## 2019-10-18 PROCEDURE — G0008 ADMIN INFLUENZA VIRUS VAC: HCPCS | Performed by: FAMILY MEDICINE

## 2019-10-18 PROCEDURE — 99213 OFFICE O/P EST LOW 20 MIN: CPT | Performed by: FAMILY MEDICINE

## 2019-10-18 RX ORDER — EPINEPHRINE 0.3 MG/.3ML
INJECTION SUBCUTANEOUS
Refills: 1 | COMMUNITY
Start: 2019-09-19

## 2019-10-18 RX ORDER — DOXEPIN HYDROCHLORIDE 50 MG/1
CAPSULE ORAL
Qty: 30 CAPSULE | Refills: 5 | Status: SHIPPED | OUTPATIENT
Start: 2019-10-18 | End: 2020-03-31

## 2019-10-18 RX ORDER — BENZONATATE 100 MG/1
100 CAPSULE ORAL 3 TIMES DAILY PRN
Qty: 30 CAPSULE | Refills: 1 | Status: SHIPPED | OUTPATIENT
Start: 2019-10-18 | End: 2019-12-04

## 2019-10-18 NOTE — PROGRESS NOTES
OFFICE VISIT NOTE:    Shanell Coffman is a 68 y.o. female who presents today for URI.     Coughing up some green phlegm, no hemoptysis.       URI    This is a new problem. The current episode started in the past 7 days. The problem has been waxing and waning. There has been no fever. Associated symptoms include coughing (NP), rhinorrhea, sinus pain, sneezing and a sore throat. Pertinent negatives include no abdominal pain, chest pain or rash. She has tried acetaminophen, antihistamine and increased fluids for the symptoms. The treatment provided moderate relief.        Past medical/surgical history, Family history, Social history, Allergies and Medications have been reviewed with the patient today and are updated in [facility] EMR. See below.    Past Medical History:   Diagnosis Date   • Anxiety    • Chronic pain    • COPD (chronic obstructive pulmonary disease) (CMS/HCC)    • GERD (gastroesophageal reflux disease)    • Hyperlipidemia      Past Surgical History:   Procedure Laterality Date   • APPENDECTOMY     • CHOLECYSTECTOMY     • COLON SURGERY     • TUBAL ABDOMINAL LIGATION       Family History   Problem Relation Age of Onset   • Heart disease Mother    • Hyperlipidemia Mother    • Hypertension Mother    • Cancer Father    • Mental illness Father    • Diabetes Maternal Grandmother    • Heart attack Maternal Grandfather    • Obesity Paternal Grandmother    • Stroke Paternal Grandfather      Social History     Tobacco Use   • Smoking status: Never Smoker   • Smokeless tobacco: Never Used   Substance Use Topics   • Alcohol use: No     Frequency: Never     Binge frequency: Never   • Drug use: No       Allergies:  Ceclor [cefaclor]; Keflex [cephalexin]; Stadol [butorphanol]; Sulfa antibiotics; and Seroquel [quetiapine]    Current Meds:    Current Outpatient Medications:   •  albuterol (PROVENTIL) (2.5 MG/3ML) 0.083% nebulizer solution, USE 3 ML PER NEBULIZER 4 TIMES A DAY, Disp: 375 mL, Rfl: 0  •  ALPRAZolam (XANAX) 1 MG  tablet, Take 1 tablet by mouth 3 (Three) Times a Day As Needed for Anxiety., Disp: 90 tablet, Rfl: 2  •  amLODIPine (NORVASC) 10 MG tablet, Take 1 tablet by mouth Daily., Disp: 90 tablet, Rfl: 1  •  Calcium Carbonate-Vitamin D (OYSTER SHELL CALCIUM 500 + D) 500-125 MG-UNIT tablet, TAKE 1 TABLET BY MOUTH WITH FOOD, Disp: 90 each, Rfl: 2  •  calcium-vitamin D (OSCAL 500/200 D-3) 500-200 MG-UNIT per tablet, Take 1 tablet by mouth Daily., Disp: 30 tablet, Rfl: 11  •  cloNIDine (CATAPRES) 0.2 MG tablet, TAKE 1 TABLET BY MOUTH 2 (TWO) TIMES A DAY. BEGIN EVENING OF 4/22/19, AND DC PATCHES, Disp: 180 tablet, Rfl: 1  •  dicyclomine (BENTYL) 20 MG tablet, TAKE 1 TABLET BY MOUTH 4 TIMES A DAY PRB, Disp: , Rfl: 2  •  diltiaZEM (TIAZAC) 360 MG 24 hr capsule, TAKE 1 CAPSULE BY MOUTH EVERY DAY, Disp: 90 capsule, Rfl: 1  •  EPINEPHrine (EPIPEN) 0.3 MG/0.3ML solution auto-injector injection, INJECT 0.3 ML INTO THE APPROPRIATE MUSCLE AS DIRECTED BY PRESCRIBER 1 (ONE) TIME FOR 1 DOSE., Disp: , Rfl: 1  •  escitalopram (LEXAPRO) 20 MG tablet, Take 1 tablet by mouth Every Night., Disp: 30 tablet, Rfl: 5  •  fexofenadine-pseudoephedrine (ALLEGRA-D)  MG per 12 hr tablet, Take 1 tablet by mouth 2 (Two) Times a Day As Needed for Allergies (sinus congestion)., Disp: 30 tablet, Rfl: 2  •  furosemide (LASIX) 40 MG tablet, Take 1 tablet by mouth Daily., Disp: 30 tablet, Rfl: 5  •  ketoconazole (NIZORAL) 2 % cream, Apply  topically to the appropriate area as directed Daily., Disp: 60 g, Rfl: 5  •  losartan (COZAAR) 50 MG tablet, Take 100 mg by mouth Daily., Disp: , Rfl:   •  metoclopramide (REGLAN) 5 MG tablet, TAKE 1 TABLET BY MOUTH 4 TIMES A DAY AS NEEDED, Disp: 120 tablet, Rfl: 5  •  metoprolol succinate XL (TOPROL-XL) 25 MG 24 hr tablet, Take 1 tablet by mouth Daily., Disp: 30 tablet, Rfl: 5  •  mirtazapine (REMERON) 15 MG tablet, Take 15 mg by mouth every night at bedtime., Disp: , Rfl: 3  •  nystatin (MYCOSTATIN) 763878 UNIT/GM  powder, Apply  topically to the appropriate area as directed 3 (Three) Times a Day., Disp: 60 g, Rfl: 3  •  O2 (OXYGEN), Inhale 2 L/min 1 (One) Time., Disp: , Rfl:   •  oxyCODONE-acetaminophen (PERCOCET) 7.5-325 MG per tablet, Take 1 tablet by mouth 4 (Four) Times a Day., Disp: , Rfl: 0  •  pantoprazole (PROTONIX) 40 MG EC tablet, Take 40 mg by mouth Daily., Disp: , Rfl:   •  potassium chloride ER (K-TAB) 20 MEQ tablet controlled-release ER tablet, Take 1 tablet by mouth Daily., Disp: 90 tablet, Rfl: 1  •  promethazine (PHENERGAN) 25 MG tablet, TAKE 1 TABLET(S) BY MOUTH DAILY AS NEEDED NAUSEA/VOMITING, Disp: , Rfl: 5  •  spironolactone (ALDACTONE) 25 MG tablet, Take 1 tablet by mouth 2 (Two) Times a Day As Needed (swelling or elevated BP)., Disp: 180 tablet, Rfl: 1  •  terbinafine (LAMISIL) 250 MG tablet, Take 1 tablet by mouth Daily., Disp: 14 tablet, Rfl: 2  •  VENTOLIN  (90 Base) MCG/ACT inhaler, INHALE 2 PUFFS EVERY 4 (FOUR) HOURS AS NEEDED FOR WHEEZING OR SHORTNESS OF AIR., Disp: 18 inhaler, Rfl: 5  •  baclofen (LIORESAL) 10 MG tablet, TAKE 1 TABLET BY MOUTH EVERY DAY, Disp: 30 tablet, Rfl: 2  •  benzonatate (TESSALON PERLES) 100 MG capsule, Take 1 capsule by mouth 3 (Three) Times a Day As Needed for Cough., Disp: 30 capsule, Rfl: 1  •  doxepin (SINEquan) 50 MG capsule, TAKE 1 CAPSULE BY MOUTH EVERY DAY AT NIGHT, Disp: 30 capsule, Rfl: 5  •  tiotropium bromide monohydrate (SPIRIVA RESPIMAT) 2.5 MCG/ACT aerosol solution inhaler, Inhale 2 puffs Daily., Disp: 4 g, Rfl: 5    Current Facility-Administered Medications:   •  methylPREDNISolone acetate (DEPO-medrol) injection 80 mg, 80 mg, Intramuscular, Once, Reji Jacobs MD    Review of Systems:  Review of Systems   Constitutional: Negative for activity change, fatigue, fever, unexpected weight gain and unexpected weight loss.   HENT: Positive for rhinorrhea, sneezing and sore throat.    Respiratory: Positive for cough (NP). Negative for shortness of  "breath.    Cardiovascular: Negative for chest pain.   Gastrointestinal: Negative for abdominal pain.   Genitourinary: Negative for difficulty urinating.   Skin: Negative for rash.   Neurological: Negative for syncope and headache.       Physical Examination:  Vital Signs:  /54 (BP Location: Right arm, Patient Position: Sitting, Cuff Size: Adult)   Pulse 74   Ht 152.4 cm (60\")   Wt 74.8 kg (164 lb 14.4 oz)   LMP  (LMP Unknown)   SpO2 97%   Breastfeeding? No   BMI 32.20 kg/m²   Physical Exam   Constitutional: She is oriented to person, place, and time. She appears well-developed and well-nourished. No distress.   HENT:   Head: Normocephalic and atraumatic.   Nose: Mucosal edema and rhinorrhea present.   Mouth/Throat: Oropharynx is clear and moist.   Neck: Normal range of motion. Neck supple. No JVD present.   Cardiovascular: Normal rate, regular rhythm, normal heart sounds and intact distal pulses.   Pulmonary/Chest: Effort normal. No respiratory distress. She has decreased breath sounds in the right middle field, the right lower field, the left middle field and the left lower field. She has rhonchi in the right middle field and the left middle field. She has no rales.   Musculoskeletal: Normal range of motion. She exhibits no edema.   Neurological: She is alert and oriented to person, place, and time. No cranial nerve deficit.   Skin: Skin is warm and dry. Capillary refill takes less than 2 seconds. No rash noted.   Psychiatric: She has a normal mood and affect. Her behavior is normal.   Nursing note and vitals reviewed.      Procedures    ASSESSMENT/ PLAN:        Problem List Items Addressed This Visit        Respiratory    Panlobular emphysema (CMS/HCC)    Relevant Medications    benzonatate (TESSALON PERLES) 100 MG capsule    tiotropium bromide monohydrate (SPIRIVA RESPIMAT) 2.5 MCG/ACT aerosol solution inhaler      Other Visit Diagnoses     Need for immunization against influenza    -  Primary    " Relevant Orders    Flucelvax Quad=>4Years ([id number]) (Completed)    Acute bronchitis, unspecified organism        Relevant Medications    benzonatate (TESSALON PERLES) 100 MG capsule    tiotropium bromide monohydrate (SPIRIVA RESPIMAT) 2.5 MCG/ACT aerosol solution inhaler    Cough        Relevant Medications    benzonatate (TESSALON PERLES) 100 MG capsule               Specific Patient Instructions:  MEDICATION Instructions: Encouraged patient to continue routine medicines as prescribed and maintain compliance. Patient instructed to report any adverse side effects or reactions to medicines promptly to the office. Patient instructed to make us aware of any OTC or herbal meds or supplement use.  DIET Recommendations: Patient instructed and provided information on the following nutrition and DIET(s): Calorie restriction for weight reduction and maintenance. Necessity for adequate daily intake of fluids/water.  EXERCISE Instructions: Discussed with patient the need for routine aerobic activity for cardiovascular fitness, 3 times a week for about 30 minutes. Daily exercise for increased fitness and weight reduction goals.    Patient's Body mass index is 32.2 kg/m². BMI is above normal parameters. Recommendations include: exercise counseling and nutrition counseling.      SMOKING Recommendations: Counseled patient and encouraged them on smoking cessation. Discussed the benefits to all body systems with smoking cessation, including decreased cardiac/lung/stroke/cancer risk.  HEALTH MAINTENANCE:  Counseling provided to patient/family about routine health maintenance and ANNUAL physicals/labs. Counseling on recommended Vaccinations appropriate for age needed.  MISCELLANEOUS Instructions: N/A      Medications ordered or changed this visit:  New Medications Ordered This Visit   Medications   • benzonatate (TESSALON PERLES) 100 MG capsule     Sig: Take 1 capsule by mouth 3 (Three) Times a Day As Needed for Cough.     Dispense:   30 capsule     Refill:  1   • tiotropium bromide monohydrate (SPIRIVA RESPIMAT) 2.5 MCG/ACT aerosol solution inhaler     Sig: Inhale 2 puffs Daily.     Dispense:  4 g     Refill:  5        FOLLOW-UP:  Return in about 3 months (around 1/18/2020) for Recheck.    I discussed the patients findings and my recommendations with patient.  An After Visit Summary (AVS) was printed and given to the patient at discharge.      Reji Jacobs MD, FAAFP  10/21/2019

## 2019-10-18 NOTE — PATIENT INSTRUCTIONS
Cough, Adult    Coughing is a reflex that clears your throat and your airways. Coughing helps to heal and protect your lungs. It is normal to cough occasionally, but a cough that happens with other symptoms or lasts a long time may be a sign of a condition that needs treatment. A cough may last only 2-3 weeks (acute), or it may last longer than 8 weeks (chronic).  What are the causes?  Coughing is commonly caused by:  · Breathing in substances that irritate your lungs.  · A viral or bacterial respiratory infection.  · Allergies.  · Asthma.  · Postnasal drip.  · Smoking.  · Acid backing up from the stomach into the esophagus (gastroesophageal reflux).  · Certain medicines.  · Chronic lung problems, including COPD (or rarely, lung cancer).  · Other medical conditions such as heart failure.  Follow these instructions at home:  Pay attention to any changes in your symptoms. Take these actions to help with your discomfort:  · Take medicines only as told by your health care provider.  ? If you were prescribed an antibiotic medicine, take it as told by your health care provider. Do not stop taking the antibiotic even if you start to feel better.  ? Talk with your health care provider before you take a cough suppressant medicine.  · Drink enough fluid to keep your urine clear or pale yellow.  · If the air is dry, use a cold steam vaporizer or humidifier in your bedroom or your home to help loosen secretions.  · Avoid anything that causes you to cough at work or at home.  · If your cough is worse at night, try sleeping in a semi-upright position.  · Avoid cigarette smoke. If you smoke, quit smoking. If you need help quitting, ask your health care provider.  · Avoid caffeine.  · Avoid alcohol.  · Rest as needed.  Contact a health care provider if:  · You have new symptoms.  · You cough up pus.  · Your cough does not get better after 2-3 weeks, or your cough gets worse.  · You cannot control your cough with suppressant  medicines and you are losing sleep.  · You develop pain that is getting worse or pain that is not controlled with pain medicines.  · You have a fever.  · You have unexplained weight loss.  · You have night sweats.  Get help right away if:  · You cough up blood.  · You have difficulty breathing.  · Your heartbeat is very fast.  This information is not intended to replace advice given to you by your health care provider. Make sure you discuss any questions you have with your health care provider.  Document Released: 06/15/2012 Document Revised: 05/25/2017 Document Reviewed: 02/24/2016  [employer] Interactive Patient Education © 2019 [employer]      Chronic Obstructive Pulmonary Disease    Chronic obstructive pulmonary disease (COPD) is a long-term (chronic) condition that affects the lungs. COPD is a general term that can be used to describe many different lung problems that cause lung swelling (inflammation) and limit airflow, including chronic bronchitis and emphysema. If you have COPD, your lung function will probably never return to normal. In most cases, it gets worse over time. However, there are steps you can take to slow the progression of the disease and improve your quality of life.  What are the causes?  This condition may be caused by:  · Smoking. This is the most common cause.  · Certain genes passed down through families.  What increases the risk?  The following factors may make you more likely to develop this condition:  · Secondhand smoke from cigarettes, pipes, or cigars.  · Exposure to chemicals and other irritants such as fumes and dust in the work environment.  · Chronic lung conditions or infections.  What are the signs or symptoms?  Symptoms of this condition include:  · Shortness of breath, especially during physical activity.  · Chronic cough with a large amount of thick mucus. Sometimes the cough may not have any mucus (dry cough).  · Wheezing.  · Rapid breaths.  · Gray or bluish  discoloration (cyanosis) of the skin, especially in your fingers, toes, or lips.  · Feeling tired (fatigue).  · Weight loss.  · Chest tightness.  · Frequent infections.  · Episodes when breathing symptoms become much worse (exacerbations).  · Swelling in the ankles, feet, or legs. This may occur in later stages of the disease.  How is this diagnosed?  This condition is diagnosed based on:  · Your medical history.  · A physical exam.  You may also have tests, including:  · Lung (pulmonary) function tests. This may include a spirometry test, which measures your ability to exhale properly.  · Chest X-ray.  · CT scan.  · Blood tests.  How is this treated?  This condition may be treated with:  · Medicines. These may include inhaled rescue medicines to treat acute exacerbations as well as long-term, or maintenance, medicines to prevent flare-ups of COPD.  ? Bronchodilators help treat COPD by dilating the airways to allow increased airflow and make your breathing more comfortable.  ? Steroids can reduce airway inflammation and help prevent exacerbations.  · Smoking cessation. If you smoke, your health care provider may ask you to quit, and may also recommend therapy or replacement products to help you quit.  · Pulmonary rehabilitation. This may involve working with a team of health care providers and specialists, such as respiratory, occupational, and physical therapists.  · Exercise and physical activity. These are beneficial for nearly all people with COPD.  · Nutrition therapy to gain weight, if you are underweight.  · Oxygen. Supplemental oxygen therapy is only helpful if you have a low oxygen level in your blood (hypoxemia).  · Lung surgery or transplant.  · Palliative care. This is to help people with COPD feel comfortable when treatment is no longer working.  Follow these instructions at home:  Medicines  · Take over-the-counter and prescription medicines (inhaled or pills) only as told by your health care  provider.  · Talk to your health care provider before taking any cough or allergy medicines. You may need to avoid certain medicines that dry out your airways.  Lifestyle  · If you are a smoker, the most important thing that you can do is to stop smoking. Do not use any products that contain nicotine or tobacco, such as cigarettes and e-cigarettes. If you need help quitting, ask your health care provider. Continuing to smoke will cause the disease to progress faster.  · Avoid exposure to things that irritate your lungs, such as smoke, chemicals, and fumes.  · Stay active, but balance activity with periods of rest. Exercise and physical activity will help you maintain your ability to do things you want to do.  · Learn and use relaxation techniques to manage stress and to control your breathing.  · Get the right amount of sleep and get quality sleep. Most adults need 7 or more hours per night.  · Eat healthy foods. Eating smaller, more frequent meals and resting before meals may help you maintain your strength.  Controlled breathing  Learn and use controlled breathing techniques as directed by your health care provider. Controlled breathing techniques include:  · Pursed lip breathing. Start by breathing in (inhaling) through your nose for 1 second. Then, purse your lips as if you were going to whistle and breathe out (exhale) through the pursed lips for 2 seconds.  · Diaphragmatic breathing. Start by putting one hand on your abdomen just above your waist. Inhale slowly through your nose. The hand on your abdomen should move out. Then purse your lips and exhale slowly. You should be able to feel the hand on your abdomen moving in as you exhale.  Controlled coughing  Learn and use controlled coughing to clear mucus from your lungs. Controlled coughing is a series of short, progressive coughs. The steps of controlled coughing are:  1. Lean your head slightly forward.  2. Breathe in deeply using diaphragmatic  breathing.  3. Try to hold your breath for 3 seconds.  4. Keep your mouth slightly open while coughing twice.  5. Spit any mucus out into a tissue.  6. Rest and repeat the steps once or twice as needed.  General instructions  · Make sure you receive all the vaccines that your health care provider recommends, especially the pneumococcal and influenza vaccines. Preventing infection and hospitalization is very important when you have COPD.  · Use oxygen therapy and pulmonary rehabilitation if directed to by your health care provider. If you require home oxygen therapy, ask your health care provider whether you should purchase a pulse oximeter to measure your oxygen level at home.  · Work with your health care provider to develop a COPD action plan. This will help you know what steps to take if your condition gets worse.  · Keep other chronic health conditions under control as told by your health care provider.  · Avoid extreme temperature and humidity changes.  · Avoid contact with people who have an illness that spreads from person to person (is contagious), such as viral infections or pneumonia.  · Keep all follow-up visits as told by your health care provider. This is important.  Contact a health care provider if:  · You are coughing up more mucus than usual.  · There is a change in the color or thickness of your mucus.  · Your breathing is more labored than usual.  · Your breathing is faster than usual.  · You have difficulty sleeping.  · You need to use your rescue medicines or inhalers more often than expected.  · You have trouble doing routine activities such as getting dressed or walking around the house.  Get help right away if:  · You have shortness of breath while you are resting.  · You have shortness of breath that prevents you from:  ? Being able to talk.  ? Performing your usual physical activities.  · You have chest pain lasting longer than 5 minutes.  · Your skin color is more blue (cyanotic) than  usual.  · You measure low oxygen saturations for longer than 5 minutes with a pulse oximeter.  · You have a fever.  · You feel too tired to breathe normally.  Summary  · Chronic obstructive pulmonary disease (COPD) is a long-term (chronic) condition that affects the lungs.  · Your lung function will probably never return to normal. In most cases, it gets worse over time. However, there are steps you can take to slow the progression of the disease and improve your quality of life.  · Treatment for COPD may include taking medicines, quitting smoking, pulmonary rehabilitation, and changes to diet and exercise. As the disease progresses, you may need oxygen therapy, a lung transplant, or palliative care.  · To help manage your condition, do not smoke, avoid exposure to things that irritate your lungs, stay up to date on all vaccines, and follow your health care provider's instructions for taking medicines.  This information is not intended to replace advice given to you by your health care provider. Make sure you discuss any questions you have with your health care provider.  Document Released: 09/27/2006 Document Revised: 06/13/2018 Document Reviewed: 01/22/2018  [employer] Interactive Patient Education © 2019 [employer]

## 2019-10-19 DIAGNOSIS — M62.830 LUMBAR PARASPINAL MUSCLE SPASM: ICD-10-CM

## 2019-10-21 RX ORDER — BACLOFEN 10 MG/1
TABLET ORAL
Qty: 30 TABLET | Refills: 2 | Status: SHIPPED | OUTPATIENT
Start: 2019-10-21 | End: 2020-03-02 | Stop reason: SDUPTHER

## 2019-10-22 ENCOUNTER — TELEPHONE (OUTPATIENT)
Dept: FAMILY MEDICINE CLINIC | Facility: CLINIC | Age: 69
End: 2019-10-22

## 2019-10-22 NOTE — TELEPHONE ENCOUNTER
Pt called, advised that she is still not feeling well. Stated that her and her  have a trip booked, however she cannot go due to how she's feeling. Pt is asking for a letter to turn in to her travel agency, to state she is not feeling well enough to travel.

## 2019-10-23 ENCOUNTER — TELEPHONE (OUTPATIENT)
Dept: FAMILY MEDICINE CLINIC | Facility: CLINIC | Age: 69
End: 2019-10-23

## 2019-10-23 NOTE — TELEPHONE ENCOUNTER
Patient needs letter stating she is unable to travel.  Patient stated you have written her one like this in the past.  Please advise?

## 2019-10-25 ENCOUNTER — OFFICE VISIT (OUTPATIENT)
Dept: FAMILY MEDICINE CLINIC | Facility: CLINIC | Age: 69
End: 2019-10-25

## 2019-10-25 VITALS
WEIGHT: 165 LBS | HEART RATE: 78 BPM | OXYGEN SATURATION: 93 % | DIASTOLIC BLOOD PRESSURE: 78 MMHG | SYSTOLIC BLOOD PRESSURE: 150 MMHG | BODY MASS INDEX: 32.39 KG/M2 | HEIGHT: 60 IN

## 2019-10-25 DIAGNOSIS — J20.9 ACUTE BRONCHITIS, UNSPECIFIED ORGANISM: Primary | ICD-10-CM

## 2019-10-25 DIAGNOSIS — J43.1 PANLOBULAR EMPHYSEMA (HCC): ICD-10-CM

## 2019-10-25 PROCEDURE — 99214 OFFICE O/P EST MOD 30 MIN: CPT | Performed by: FAMILY MEDICINE

## 2019-10-25 PROCEDURE — 96372 THER/PROPH/DIAG INJ SC/IM: CPT | Performed by: FAMILY MEDICINE

## 2019-10-25 RX ORDER — ALBUTEROL SULFATE 2.5 MG/3ML
2.5 SOLUTION RESPIRATORY (INHALATION) ONCE
Status: DISCONTINUED | OUTPATIENT
Start: 2019-10-25 | End: 2020-01-08

## 2019-10-25 RX ORDER — PROMETHAZINE HCL/CODEINE 6.25-10/5
5 SYRUP ORAL EVERY 4 HOURS PRN
Qty: 240 ML | Refills: 0 | Status: SHIPPED | OUTPATIENT
Start: 2019-10-25 | End: 2019-12-04

## 2019-10-25 RX ORDER — METHYLPREDNISOLONE 4 MG/1
TABLET ORAL
Qty: 21 EACH | Refills: 0 | Status: SHIPPED | OUTPATIENT
Start: 2019-10-25 | End: 2019-12-04

## 2019-10-25 RX ORDER — AZITHROMYCIN 250 MG/1
TABLET, FILM COATED ORAL
Qty: 6 TABLET | Refills: 0 | Status: SHIPPED | OUTPATIENT
Start: 2019-10-25 | End: 2019-11-12 | Stop reason: SDUPTHER

## 2019-10-25 RX ORDER — METHYLPREDNISOLONE ACETATE 80 MG/ML
80 INJECTION, SUSPENSION INTRA-ARTICULAR; INTRALESIONAL; INTRAMUSCULAR; SOFT TISSUE ONCE
Status: COMPLETED | OUTPATIENT
Start: 2019-10-25 | End: 2019-10-25

## 2019-10-25 RX ADMIN — METHYLPREDNISOLONE ACETATE 80 MG: 80 INJECTION, SUSPENSION INTRA-ARTICULAR; INTRALESIONAL; INTRAMUSCULAR; SOFT TISSUE at 10:05

## 2019-10-25 NOTE — PROGRESS NOTES
OFFICE VISIT NOTE:    Shanell Coffman is a 68 y.o. female who presents today for URI (1 week recheck, not feeling any better).     Coughing but NP, and fever to 101 a day ago.       URI    This is a new problem. The current episode started in the past 7 days. The problem has been gradually worsening. The maximum temperature recorded prior to her arrival was 101 - 101.9 F. The fever has been present for less than 1 day. Associated symptoms include coughing and rhinorrhea. Pertinent negatives include no abdominal pain, chest pain or rash. She has tried acetaminophen, increased fluids and antihistamine for the symptoms. The treatment provided moderate relief.        Past medical/surgical history, Family history, Social history, Allergies and Medications have been reviewed with the patient today and are updated in [facility] EMR. See below.    Past Medical History:   Diagnosis Date   • Anxiety    • Chronic pain    • COPD (chronic obstructive pulmonary disease) (CMS/[facility])    • GERD (gastroesophageal reflux disease)    • Hyperlipidemia      Past Surgical History:   Procedure Laterality Date   • APPENDECTOMY     • CHOLECYSTECTOMY     • COLON SURGERY     • TUBAL ABDOMINAL LIGATION       Family History   Problem Relation Age of Onset   • Heart disease Mother    • Hyperlipidemia Mother    • Hypertension Mother    • Cancer Father    • Mental illness Father    • Diabetes Maternal Grandmother    • Heart attack Maternal Grandfather    • Obesity Paternal Grandmother    • Stroke Paternal Grandfather      Social History     Tobacco Use   • Smoking status: Never Smoker   • Smokeless tobacco: Never Used   Substance Use Topics   • Alcohol use: No     Frequency: Never     Binge frequency: Never   • Drug use: No       ALLERGIES:  Ceclor [cefaclor]; Keflex [cephalexin]; Stadol [butorphanol]; Sulfa antibiotics; and Seroquel [quetiapine]    CURRENT MEDS:    Current Outpatient Medications:   •  albuterol (PROVENTIL) (2.5 MG/3ML) 0.083% nebulizer  solution, USE 3 ML PER NEBULIZER 4 TIMES A DAY, Disp: 375 mL, Rfl: 0  •  ALPRAZolam (XANAX) 1 MG tablet, Take 1 tablet by mouth 3 (Three) Times a Day As Needed for Anxiety., Disp: 90 tablet, Rfl: 2  •  amLODIPine (NORVASC) 10 MG tablet, Take 1 tablet by mouth Daily., Disp: 90 tablet, Rfl: 1  •  baclofen (LIORESAL) 10 MG tablet, TAKE 1 TABLET BY MOUTH EVERY DAY, Disp: 30 tablet, Rfl: 2  •  benzonatate (TESSALON PERLES) 100 MG capsule, Take 1 capsule by mouth 3 (Three) Times a Day As Needed for Cough., Disp: 30 capsule, Rfl: 1  •  Calcium Carbonate-Vitamin D (OYSTER SHELL CALCIUM 500 + D) 500-125 MG-UNIT tablet, TAKE 1 TABLET BY MOUTH WITH FOOD, Disp: 90 each, Rfl: 2  •  calcium-vitamin D (OSCAL 500/200 D-3) 500-200 MG-UNIT per tablet, Take 1 tablet by mouth Daily., Disp: 30 tablet, Rfl: 11  •  cloNIDine (CATAPRES) 0.2 MG tablet, TAKE 1 TABLET BY MOUTH 2 (TWO) TIMES A DAY. BEGIN EVENING OF 4/22/19, AND DC PATCHES, Disp: 180 tablet, Rfl: 1  •  dicyclomine (BENTYL) 20 MG tablet, TAKE 1 TABLET BY MOUTH 4 TIMES A DAY PRB, Disp: , Rfl: 2  •  diltiaZEM (TIAZAC) 360 MG 24 hr capsule, TAKE 1 CAPSULE BY MOUTH EVERY DAY, Disp: 90 capsule, Rfl: 1  •  doxepin (SINEquan) 50 MG capsule, TAKE 1 CAPSULE BY MOUTH EVERY DAY AT NIGHT, Disp: 30 capsule, Rfl: 5  •  EPINEPHrine (EPIPEN) 0.3 MG/0.3ML solution auto-injector injection, INJECT 0.3 ML INTO THE APPROPRIATE MUSCLE AS DIRECTED BY PRESCRIBER 1 (ONE) TIME FOR 1 DOSE., Disp: , Rfl: 1  •  escitalopram (LEXAPRO) 20 MG tablet, Take 1 tablet by mouth Every Night., Disp: 30 tablet, Rfl: 5  •  fexofenadine-pseudoephedrine (ALLEGRA-D)  MG per 12 hr tablet, Take 1 tablet by mouth 2 (Two) Times a Day As Needed for Allergies (sinus congestion)., Disp: 30 tablet, Rfl: 2  •  furosemide (LASIX) 40 MG tablet, Take 1 tablet by mouth Daily., Disp: 30 tablet, Rfl: 5  •  ketoconazole (NIZORAL) 2 % cream, Apply  topically to the appropriate area as directed Daily., Disp: 60 g, Rfl: 5  •  losartan  (COZAAR) 50 MG tablet, Take 100 mg by mouth Daily., Disp: , Rfl:   •  metoclopramide (REGLAN) 5 MG tablet, TAKE 1 TABLET BY MOUTH 4 TIMES A DAY AS NEEDED, Disp: 120 tablet, Rfl: 5  •  metoprolol succinate XL (TOPROL-XL) 25 MG 24 hr tablet, Take 1 tablet by mouth Daily., Disp: 30 tablet, Rfl: 5  •  mirtazapine (REMERON) 15 MG tablet, Take 15 mg by mouth every night at bedtime., Disp: , Rfl: 3  •  nystatin (MYCOSTATIN) 724351 UNIT/GM powder, Apply  topically to the appropriate area as directed 3 (Three) Times a Day., Disp: 60 g, Rfl: 3  •  O2 (OXYGEN), Inhale 2 L/min 1 (One) Time., Disp: , Rfl:   •  oxyCODONE-acetaminophen (PERCOCET) 7.5-325 MG per tablet, Take 1 tablet by mouth 4 (Four) Times a Day., Disp: , Rfl: 0  •  pantoprazole (PROTONIX) 40 MG EC tablet, Take 40 mg by mouth Daily., Disp: , Rfl:   •  potassium chloride ER (K-TAB) 20 MEQ tablet controlled-release ER tablet, Take 1 tablet by mouth Daily., Disp: 90 tablet, Rfl: 1  •  promethazine (PHENERGAN) 25 MG tablet, TAKE 1 TABLET(S) BY MOUTH DAILY AS NEEDED NAUSEA/VOMITING, Disp: , Rfl: 5  •  spironolactone (ALDACTONE) 25 MG tablet, Take 1 tablet by mouth 2 (Two) Times a Day As Needed (swelling or elevated BP)., Disp: 180 tablet, Rfl: 1  •  terbinafine (LAMISIL) 250 MG tablet, Take 1 tablet by mouth Daily., Disp: 14 tablet, Rfl: 2  •  tiotropium bromide monohydrate (SPIRIVA RESPIMAT) 2.5 MCG/ACT aerosol solution inhaler, Inhale 2 puffs Daily., Disp: 4 g, Rfl: 5  •  VENTOLIN  (90 Base) MCG/ACT inhaler, INHALE 2 PUFFS EVERY 4 (FOUR) HOURS AS NEEDED FOR WHEEZING OR SHORTNESS OF AIR., Disp: 18 inhaler, Rfl: 5  •  azithromycin (ZITHROMAX) 250 MG tablet, Take 2 tablets the first day, then 1 tablet daily for 4 days., Disp: 6 tablet, Rfl: 0  •  methylPREDNISolone (MEDROL, ANNA,) 4 MG tablet, Take as directed on package instructions., Disp: 21 each, Rfl: 0  •  promethazine-codeine (PHENERGAN with CODEINE) 6.25-10 MG/5ML syrup, Take 5 mL by mouth Every 4 (Four)  "Hours As Needed for Cough., Disp: 240 mL, Rfl: 0    Current Facility-Administered Medications:   •  albuterol (PROVENTIL) nebulizer solution 0.083% 2.5 mg/3mL, 2.5 mg, Nebulization, Once, Reji Jacobs MD    REVIEW OF SYSTEMS:  Review of Systems   Constitutional: Negative for activity change, fatigue, fever, unexpected weight gain and unexpected weight loss.   HENT: Positive for rhinorrhea and sinus pressure.    Respiratory: Positive for cough. Negative for shortness of breath.    Cardiovascular: Negative for chest pain.   Gastrointestinal: Negative for abdominal pain.   Genitourinary: Negative for difficulty urinating.   Skin: Negative for rash.   Neurological: Negative for syncope and headache.       PHYSICAL EXAMINATION:  Vital Signs:  /78 (BP Location: Left arm, Patient Position: Sitting, Cuff Size: Adult)   Pulse 78   Ht 152.4 cm (60\")   Wt 74.8 kg (165 lb)   LMP  (LMP Unknown)   SpO2 93% Comment: on 3 LPM of O2  BMI 32.22 kg/m²   Physical Exam   Constitutional: She is oriented to person, place, and time. She appears well-developed and well-nourished. No distress.   HENT:   Head: Normocephalic and atraumatic.   Nose: Mucosal edema and rhinorrhea present. Right sinus exhibits maxillary sinus tenderness. Left sinus exhibits maxillary sinus tenderness.   Mouth/Throat: Oropharynx is clear and moist.   Neck: Normal range of motion. Neck supple. No JVD present.   Cardiovascular: Normal rate, regular rhythm, normal heart sounds and intact distal pulses.   Pulmonary/Chest: Effort normal and breath sounds normal. No respiratory distress.   Musculoskeletal: Normal range of motion. She exhibits no edema.   Neurological: She is alert and oriented to person, place, and time. No cranial nerve deficit.   Skin: Skin is warm and dry. Capillary refill takes less than 2 seconds. No rash noted.   Psychiatric: She has a normal mood and affect. Her behavior is normal.   Nursing note and vitals " reviewed.      Procedures    ASSESSMENT/ PLAN:  Problem List Items Addressed This Visit        Respiratory    Panlobular emphysema (CMS/HCC)    Relevant Medications    albuterol (PROVENTIL) nebulizer solution 0.083% 2.5 mg/3mL    methylPREDNISolone acetate (DEPO-medrol) injection 80 mg (Completed)    methylPREDNISolone (MEDROL, ANNA,) 4 MG tablet    promethazine-codeine (PHENERGAN with CODEINE) 6.25-10 MG/5ML syrup      Other Visit Diagnoses     Acute bronchitis, unspecified organism    -  Primary    Relevant Medications    albuterol (PROVENTIL) nebulizer solution 0.083% 2.5 mg/3mL    methylPREDNISolone acetate (DEPO-medrol) injection 80 mg (Completed)    methylPREDNISolone (MEDROL, ANNA,) 4 MG tablet    azithromycin (ZITHROMAX) 250 MG tablet    promethazine-codeine (PHENERGAN with CODEINE) 6.25-10 MG/5ML syrup            Specific Patient Instructions:  MEDICATION Instructions: Encouraged patient to continue routine medicines as prescribed and maintain compliance. Patient instructed to report any adverse side effects or reactions to medicines promptly to the office. Patient instructed to make us aware of any OTC or herbal meds or supplement use.  DIET Recommendations: Patient instructed and provided information on the following nutrition and DIET(s): Calorie restriction for weight reduction and maintenance. Necessity for adequate daily intake of fluids/water.  EXERCISE Instructions: Discussed with patient the need for routine aerobic activity for cardiovascular fitness, 3 times a week for about 30 minutes. Daily exercise for increased fitness and weight reduction goals.    Patient's Body mass index is 32.22 kg/m². BMI is above normal parameters. Recommendations include: exercise counseling and nutrition counseling.      SMOKING Recommendations: Counseled patient and encouraged them on smoking cessation. Discussed the benefits to all body systems with smoking cessation, including decreased cardiac/lung/stroke/cancer  risk.  HEALTH MAINTENANCE:  Counseling provided to patient/family about routine health maintenance and ANNUAL physicals/labs. Counseling on recommended Vaccinations appropriate for age needed.  MISCELLANEOUS Instructions: N/A      Medications or Orders placed this visit:  No orders of the defined types were placed in this encounter.      Medications DISCONTINUED this visit:  Medications Discontinued During This Encounter   Medication Reason   • methylPREDNISolone acetate (DEPO-medrol) injection 80 mg *Therapy completed       FOLLOW-UP:  No Follow-up on file.    I discussed the patients findings and my recommendations with patient.  An After Visit Summary (AVS) was printed and given to the patient at discharge.      Reji Jacobs MD, FAAFP  10/29/2019

## 2019-10-30 ENCOUNTER — TELEPHONE (OUTPATIENT)
Dept: FAMILY MEDICINE CLINIC | Facility: CLINIC | Age: 69
End: 2019-10-30

## 2019-10-30 NOTE — TELEPHONE ENCOUNTER
"Pt called, stated she is feeling better in regards to her lungs. However, she advised that she now has a \"stomach bug.\" She is requesting medication for nausea be sent to [facility].   "

## 2019-10-31 RX ORDER — ONDANSETRON 4 MG/1
4 TABLET, ORALLY DISINTEGRATING ORAL EVERY 8 HOURS PRN
Qty: 30 TABLET | Refills: 1 | Status: SHIPPED | OUTPATIENT
Start: 2019-10-31 | End: 2019-12-04 | Stop reason: SDUPTHER

## 2019-11-11 ENCOUNTER — TELEPHONE (OUTPATIENT)
Dept: FAMILY MEDICINE CLINIC | Facility: CLINIC | Age: 69
End: 2019-11-11

## 2019-11-11 DIAGNOSIS — J20.9 ACUTE BRONCHITIS, UNSPECIFIED ORGANISM: ICD-10-CM

## 2019-11-11 NOTE — TELEPHONE ENCOUNTER
Pt called, stated that she is coughing up green with some blood in it. Pt advised that she has been running a fever as well. Pt does not want to make appt as she fears she will be exposed to more in the office. Pt said her oxygen level is 87 while using her oxygen at this time.

## 2019-11-12 RX ORDER — AZITHROMYCIN 250 MG/1
TABLET, FILM COATED ORAL
Qty: 6 TABLET | Refills: 0 | Status: SHIPPED | OUTPATIENT
Start: 2019-11-12 | End: 2019-12-04

## 2019-11-12 NOTE — TELEPHONE ENCOUNTER
Pt called, stated she missed phone call from our office. Advised of Mrs. Whitman's message. Pt voiced understanding.

## 2019-11-12 NOTE — TELEPHONE ENCOUNTER
Prescription called in.  Please advise patient to seek medical attention if her symptoms do not improve OR if at any time, they worsen.

## 2019-11-20 ENCOUNTER — TELEPHONE (OUTPATIENT)
Dept: FAMILY MEDICINE CLINIC | Facility: CLINIC | Age: 69
End: 2019-11-20

## 2019-11-20 NOTE — TELEPHONE ENCOUNTER
Patient just wanted MD to know if an order came through from an outside company requesting pads this is something she has been receiving for several years now and would appreciate it if you would sign off on them.

## 2019-12-04 ENCOUNTER — OFFICE VISIT (OUTPATIENT)
Dept: FAMILY MEDICINE CLINIC | Facility: CLINIC | Age: 69
End: 2019-12-04

## 2019-12-04 ENCOUNTER — NURSE TRIAGE (OUTPATIENT)
Dept: CALL CENTER | Facility: HOSPITAL | Age: 69
End: 2019-12-04

## 2019-12-04 VITALS
WEIGHT: 168.6 LBS | DIASTOLIC BLOOD PRESSURE: 68 MMHG | BODY MASS INDEX: 32.93 KG/M2 | HEART RATE: 80 BPM | SYSTOLIC BLOOD PRESSURE: 140 MMHG | OXYGEN SATURATION: 98 %

## 2019-12-04 DIAGNOSIS — R63.5 WEIGHT GAIN: ICD-10-CM

## 2019-12-04 DIAGNOSIS — R11.2 NON-INTRACTABLE VOMITING WITH NAUSEA, UNSPECIFIED VOMITING TYPE: ICD-10-CM

## 2019-12-04 DIAGNOSIS — J20.9 ACUTE BRONCHITIS, UNSPECIFIED ORGANISM: ICD-10-CM

## 2019-12-04 DIAGNOSIS — J43.1 PANLOBULAR EMPHYSEMA (HCC): ICD-10-CM

## 2019-12-04 DIAGNOSIS — I10 ESSENTIAL HYPERTENSION: ICD-10-CM

## 2019-12-04 DIAGNOSIS — F41.9 ANXIETY: Primary | ICD-10-CM

## 2019-12-04 DIAGNOSIS — M47.819 ARTHRITIS, LOW BACK: ICD-10-CM

## 2019-12-04 PROCEDURE — 99214 OFFICE O/P EST MOD 30 MIN: CPT | Performed by: FAMILY MEDICINE

## 2019-12-04 RX ORDER — PROMETHAZINE HCL/CODEINE 6.25-10/5
5 SYRUP ORAL EVERY 4 HOURS PRN
Qty: 240 ML | Refills: 0 | Status: SHIPPED | OUTPATIENT
Start: 2019-12-04 | End: 2020-01-08

## 2019-12-04 RX ORDER — NABUMETONE 500 MG/1
500 TABLET, FILM COATED ORAL 2 TIMES DAILY PRN
Qty: 60 TABLET | Refills: 5 | Status: SHIPPED | OUTPATIENT
Start: 2019-12-04 | End: 2020-10-16 | Stop reason: SDUPTHER

## 2019-12-04 RX ORDER — ALPRAZOLAM 1 MG/1
1 TABLET ORAL 3 TIMES DAILY PRN
Qty: 90 TABLET | Refills: 2 | Status: SHIPPED | OUTPATIENT
Start: 2019-12-04 | End: 2020-03-02 | Stop reason: SDUPTHER

## 2019-12-04 RX ORDER — ONDANSETRON 4 MG/1
4 TABLET, ORALLY DISINTEGRATING ORAL EVERY 8 HOURS PRN
Qty: 30 TABLET | Refills: 1 | Status: SHIPPED | OUTPATIENT
Start: 2019-12-04 | End: 2020-01-27 | Stop reason: SDUPTHER

## 2019-12-04 NOTE — PROGRESS NOTES
OFFICE VISIT NOTE:    Shanell Coffman is a 69 y.o. female who presents today for Anxiety (3 mth recheck) and Med Refill.     Also, needs refills for several meds for chronic stable conditions.      Anxiety   Presents for follow-up visit. Symptoms include nervous/anxious behavior. Patient reports no chest pain, excessive worry, obsessions, palpitations, panic, restlessness, shortness of breath or suicidal ideas. Symptoms occur most days. The severity of symptoms is causing significant distress. The quality of sleep is good. Nighttime awakenings: occasional.     Compliance with medications is %.   Hypertension   This is a chronic problem. The current episode started more than 1 year ago. The problem is unchanged. The problem is controlled. Associated symptoms include anxiety. Pertinent negatives include no chest pain, headaches, orthopnea, palpitations, peripheral edema, PND or shortness of breath. There are no associated agents to hypertension. The current treatment provides mild improvement. There are no compliance problems.         Past medical/surgical history, Family history, Social history, Allergies and Medications have been reviewed with the patient today and are updated in [facility] EMR. See below.    Past Medical History:   Diagnosis Date   • Anxiety    • Chronic pain    • COPD (chronic obstructive pulmonary disease) (CMS/HCC)    • GERD (gastroesophageal reflux disease)    • Hyperlipidemia      Past Surgical History:   Procedure Laterality Date   • APPENDECTOMY     • CHOLECYSTECTOMY     • COLON SURGERY     • TUBAL ABDOMINAL LIGATION       Family History   Problem Relation Age of Onset   • Heart disease Mother    • Hyperlipidemia Mother    • Hypertension Mother    • Cancer Father    • Mental illness Father    • Diabetes Maternal Grandmother    • Heart attack Maternal Grandfather    • Obesity Paternal Grandmother    • Stroke Paternal Grandfather      Social History     Tobacco Use   • Smoking status: Never  Smoker   • Smokeless tobacco: Never Used   Substance Use Topics   • Alcohol use: No     Frequency: Never     Binge frequency: Never   • Drug use: No       ALLERGIES:  Ceclor [cefaclor]; Keflex [cephalexin]; Stadol [butorphanol]; Sulfa antibiotics; and Seroquel [quetiapine]    CURRENT MEDS:    Current Outpatient Medications:   •  albuterol (PROVENTIL) (2.5 MG/3ML) 0.083% nebulizer solution, USE 3 ML PER NEBULIZER 4 TIMES A DAY, Disp: 375 mL, Rfl: 0  •  ALPRAZolam (XANAX) 1 MG tablet, Take 1 tablet by mouth 3 (Three) Times a Day As Needed for Anxiety., Disp: 90 tablet, Rfl: 2  •  amLODIPine (NORVASC) 10 MG tablet, Take 1 tablet by mouth Daily., Disp: 90 tablet, Rfl: 1  •  baclofen (LIORESAL) 10 MG tablet, TAKE 1 TABLET BY MOUTH EVERY DAY, Disp: 30 tablet, Rfl: 2  •  Calcium Carbonate-Vitamin D (OYSTER SHELL CALCIUM 500 + D) 500-125 MG-UNIT tablet, TAKE 1 TABLET BY MOUTH WITH FOOD, Disp: 90 each, Rfl: 2  •  calcium-vitamin D (OSCAL 500/200 D-3) 500-200 MG-UNIT per tablet, Take 1 tablet by mouth Daily., Disp: 30 tablet, Rfl: 11  •  cloNIDine (CATAPRES) 0.2 MG tablet, TAKE 1 TABLET BY MOUTH 2 (TWO) TIMES A DAY. BEGIN EVENING OF 4/22/19, AND DC PATCHES, Disp: 180 tablet, Rfl: 1  •  dicyclomine (BENTYL) 20 MG tablet, TAKE 1 TABLET BY MOUTH 4 TIMES A DAY PRB, Disp: , Rfl: 2  •  diltiaZEM (TIAZAC) 360 MG 24 hr capsule, TAKE 1 CAPSULE BY MOUTH EVERY DAY, Disp: 90 capsule, Rfl: 1  •  doxepin (SINEquan) 50 MG capsule, TAKE 1 CAPSULE BY MOUTH EVERY DAY AT NIGHT, Disp: 30 capsule, Rfl: 5  •  EPINEPHrine (EPIPEN) 0.3 MG/0.3ML solution auto-injector injection, INJECT 0.3 ML INTO THE APPROPRIATE MUSCLE AS DIRECTED BY PRESCRIBER 1 (ONE) TIME FOR 1 DOSE., Disp: , Rfl: 1  •  escitalopram (LEXAPRO) 20 MG tablet, Take 1 tablet by mouth Every Night., Disp: 30 tablet, Rfl: 5  •  fexofenadine-pseudoephedrine (ALLEGRA-D)  MG per 12 hr tablet, Take 1 tablet by mouth 2 (Two) Times a Day As Needed for Allergies (sinus congestion)., Disp:  30 tablet, Rfl: 2  •  furosemide (LASIX) 40 MG tablet, Take 1 tablet by mouth Daily., Disp: 30 tablet, Rfl: 5  •  ketoconazole (NIZORAL) 2 % cream, Apply  topically to the appropriate area as directed Daily., Disp: 60 g, Rfl: 5  •  losartan (COZAAR) 50 MG tablet, Take 100 mg by mouth Daily., Disp: , Rfl:   •  metoclopramide (REGLAN) 5 MG tablet, TAKE 1 TABLET BY MOUTH 4 TIMES A DAY AS NEEDED, Disp: 120 tablet, Rfl: 5  •  metoprolol succinate XL (TOPROL-XL) 25 MG 24 hr tablet, Take 1 tablet by mouth Daily., Disp: 30 tablet, Rfl: 5  •  mirtazapine (REMERON) 15 MG tablet, Take 15 mg by mouth every night at bedtime., Disp: , Rfl: 3  •  nystatin (MYCOSTATIN) 931253 UNIT/GM powder, Apply  topically to the appropriate area as directed 3 (Three) Times a Day., Disp: 60 g, Rfl: 3  •  O2 (OXYGEN), Inhale 2 L/min 1 (One) Time., Disp: , Rfl:   •  ondansetron ODT (ZOFRAN ODT) 4 MG disintegrating tablet, Take 1 tablet by mouth Every 8 (Eight) Hours As Needed for Nausea or Vomiting., Disp: 30 tablet, Rfl: 1  •  pantoprazole (PROTONIX) 40 MG EC tablet, Take 40 mg by mouth Daily., Disp: , Rfl:   •  potassium chloride ER (K-TAB) 20 MEQ tablet controlled-release ER tablet, Take 1 tablet by mouth Daily., Disp: 90 tablet, Rfl: 1  •  promethazine (PHENERGAN) 25 MG tablet, TAKE 1 TABLET(S) BY MOUTH DAILY AS NEEDED NAUSEA/VOMITING, Disp: , Rfl: 5  •  spironolactone (ALDACTONE) 25 MG tablet, Take 1 tablet by mouth 2 (Two) Times a Day As Needed (swelling or elevated BP)., Disp: 180 tablet, Rfl: 1  •  terbinafine (LAMISIL) 250 MG tablet, Take 1 tablet by mouth Daily., Disp: 14 tablet, Rfl: 2  •  tiotropium bromide monohydrate (SPIRIVA RESPIMAT) 2.5 MCG/ACT aerosol solution inhaler, Inhale 2 puffs Daily., Disp: 4 g, Rfl: 5  •  VENTOLIN  (90 Base) MCG/ACT inhaler, INHALE 2 PUFFS EVERY 4 (FOUR) HOURS AS NEEDED FOR WHEEZING OR SHORTNESS OF AIR., Disp: 18 inhaler, Rfl: 5  •  linaclotide (LINZESS) 72 MCG capsule capsule, Take 1 capsule by  mouth Every Morning Before Breakfast., Disp: 30 capsule, Rfl: 5  •  nabumetone (RELAFEN) 500 MG tablet, Take 1 tablet by mouth 2 (Two) Times a Day As Needed for Moderate Pain ., Disp: 60 tablet, Rfl: 5  •  promethazine-codeine (PHENERGAN with CODEINE) 6.25-10 MG/5ML syrup, Take 5 mL by mouth Every 4 (Four) Hours As Needed for Cough., Disp: 240 mL, Rfl: 0    Current Facility-Administered Medications:   •  albuterol (PROVENTIL) nebulizer solution 0.083% 2.5 mg/3mL, 2.5 mg, Nebulization, Once, Reji Jacobs MD    REVIEW OF SYSTEMS:  Review of Systems   Constitutional: Negative for activity change, fatigue, fever, unexpected weight gain and unexpected weight loss.   Respiratory: Negative for shortness of breath.    Cardiovascular: Negative for chest pain, palpitations, orthopnea and PND.   Gastrointestinal: Negative for abdominal pain.   Genitourinary: Negative for difficulty urinating.   Skin: Negative for rash.   Neurological: Negative for syncope and headache.   Psychiatric/Behavioral: Negative for suicidal ideas. The patient is nervous/anxious.        PHYSICAL EXAMINATION:  Vital Signs:  /68 (BP Location: Left arm, Patient Position: Sitting, Cuff Size: Adult)   Pulse 80   Wt 76.5 kg (168 lb 9.6 oz)   LMP  (LMP Unknown)   SpO2 98%   Breastfeeding No   BMI 32.93 kg/m²   Physical Exam   Constitutional: She is oriented to person, place, and time. She appears well-developed and well-nourished. No distress.   HENT:   Head: Normocephalic and atraumatic.   Mouth/Throat: Oropharynx is clear and moist.   Neck: Normal range of motion. Neck supple. No JVD present.   Cardiovascular: Normal rate, regular rhythm, normal heart sounds and intact distal pulses.   Pulmonary/Chest: Effort normal and breath sounds normal. No respiratory distress.   Musculoskeletal: Normal range of motion. She exhibits no edema.   Neurological: She is alert and oriented to person, place, and time. No cranial nerve deficit.   Skin: Skin  is warm and dry. Capillary refill takes less than 2 seconds. No rash noted.   Psychiatric: She has a normal mood and affect. Her behavior is normal.   Nursing note and vitals reviewed.      Procedures    ASSESSMENT/ PLAN:  Problem List Items Addressed This Visit        Cardiovascular and Mediastinum    Essential hypertension    Relevant Orders    Comprehensive Metabolic Panel (Completed)    CBC (No Diff) (Completed)       Respiratory    Panlobular emphysema (CMS/HCC)    Relevant Medications    promethazine-codeine (PHENERGAN with CODEINE) 6.25-10 MG/5ML syrup       Musculoskeletal and Integument    Arthritis, low back    Relevant Medications    nabumetone (RELAFEN) 500 MG tablet       Other    Anxiety - Primary    Relevant Medications    ALPRAZolam (XANAX) 1 MG tablet      Other Visit Diagnoses     Weight gain        Relevant Orders    Comprehensive Metabolic Panel (Completed)    CBC (No Diff) (Completed)    Non-intractable vomiting with nausea, unspecified vomiting type        Relevant Medications    ondansetron ODT (ZOFRAN ODT) 4 MG disintegrating tablet    Acute bronchitis, unspecified organism        Relevant Medications    promethazine-codeine (PHENERGAN with CODEINE) 6.25-10 MG/5ML syrup            Specific Patient Instructions:  MEDICATION Instructions: Encouraged patient to continue routine medicines as prescribed and maintain compliance. Patient instructed to report any adverse side effects or reactions to medicines promptly to the office. Patient instructed to make us aware of any OTC or herbal meds or supplement use.  DIET Recommendations: Patient instructed and provided information on the following nutrition and DIET(s): Calorie restriction for weight reduction and maintenance. Necessity for adequate daily intake of fluids/water.  EXERCISE Instructions: Discussed with patient the need for routine aerobic activity for cardiovascular fitness, 3 times a week for about 30 minutes. Daily exercise for  increased fitness and weight reduction goals.    Patient's Body mass index is 32.93 kg/m². BMI is above normal parameters. Recommendations include: exercise counseling and nutrition counseling.      SMOKING Recommendations: Counseled patient and encouraged them on smoking cessation. Discussed the benefits to all body systems with smoking cessation, including decreased cardiac/lung/stroke/cancer risk.  HEALTH MAINTENANCE:  Counseling provided to patient/family about routine health maintenance and ANNUAL physicals/labs. Counseling on recommended Vaccinations appropriate for age needed.  MISCELLANEOUS Instructions: N/A      Medications or Orders placed this visit:  Orders Placed This Encounter   Procedures   • Comprehensive Metabolic Panel   • CBC (No Diff)       Medications DISCONTINUED this visit:  Medications Discontinued During This Encounter   Medication Reason   • azithromycin (ZITHROMAX) 250 MG tablet *Therapy completed   • benzonatate (TESSALON PERLES) 100 MG capsule *Therapy completed   • methylPREDNISolone (MEDROL, ANNA,) 4 MG tablet *Therapy completed   • oxyCODONE-acetaminophen (PERCOCET) 7.5-325 MG per tablet *Therapy completed   • promethazine-codeine (PHENERGAN with CODEINE) 6.25-10 MG/5ML syrup *Therapy completed   • ALPRAZolam (XANAX) 1 MG tablet Reorder   • ondansetron ODT (ZOFRAN ODT) 4 MG disintegrating tablet Reorder       FOLLOW-UP:  Return in about 3 months (around 3/4/2020) for Recheck.    I discussed the patients findings and my recommendations with patient.  An After Visit Summary (AVS) was printed and given to the patient at discharge.      Reji Jacobs MD, FAAFP  12/8/2019

## 2019-12-04 NOTE — TELEPHONE ENCOUNTER
"States she saw Dr. Jacobs today and he was supposed to call in [facility] for her. Spoke with Dr. Jacobs and he states he tried to call patient for dosage she had previously but was not able to reach her. This nurse checked with patient and she states she took \"I think 75\", explained comes in 72 mcg dose. She states that is correct dosage. This nurse notified Dr. Jacobs who states he will send it in now. This nurse notified patient.     Reason for Disposition  • [1] Prescription not at pharmacy AND [2] was prescribed today by PCP    Additional Information  • Negative: Drug overdose and nurse unable to answer question  • Negative: Caller requesting information not related to medicine  • Negative: Caller requesting a prescription for Strep throat and has a positive culture result  • Negative: Rash while taking a medication or within 3 days of stopping it  • Negative: Immunization reaction suspected  • Negative: [1] Asthma and [2] having symptoms of asthma (cough, wheezing, etc)  • Negative: MORE THAN A DOUBLE DOSE of a prescription or over-the-counter (OTC) drug  • Negative: [1] DOUBLE DOSE (an extra dose or lesser amount) of over-the-counter (OTC) drug AND [2] any symptoms (e.g., dizziness, nausea, pain, sleepiness)  • Negative: [1] DOUBLE DOSE (an extra dose or lesser amount) of prescription drug AND [2] any symptoms (e.g., dizziness, nausea, pain, sleepiness)  • Negative: Took another person's prescription drug  • Negative: [1] DOUBLE DOSE (an extra dose or lesser amount) of prescription drug AND [2] NO symptoms (Exception: a double dose of antibiotics)  • Negative: Diabetes drug error or overdose (e.g., insulin or extra dose)  • Negative: [1] Request for URGENT new prescription or refill of \"essential\" medication (i.e., likelihood of harm to patient if not taken) AND [2] triager unable to fill per unit policy    Answer Assessment - Initial Assessment Questions  1. SYMPTOMS: \"Do you have any symptoms?\"      See " "note  2. SEVERITY: If symptoms are present, ask \"Are they mild, moderate or severe?\"      n/a    Protocols used: MEDICATION QUESTION CALL-ADULT-      "

## 2019-12-04 NOTE — PATIENT INSTRUCTIONS
Generalized Anxiety Disorder, Adult  Generalized anxiety disorder (SHENG) is a mental health disorder. People with this condition constantly worry about everyday events. Unlike normal anxiety, worry related to SHENG is not triggered by a specific event. These worries also do not fade or get better with time. SHENG interferes with life functions, including relationships, work, and school.  SHENG can vary from mild to severe. People with severe SHENG can have intense waves of anxiety with physical symptoms (panic attacks).  What are the causes?  The exact cause of SHENG is not known.  What increases the risk?  This condition is more likely to develop in:  · Women.  · People who have a family history of anxiety disorders.  · People who are very shy.  · People who experience very stressful life events, such as the death of a loved one.  · People who have a very stressful family environment.  What are the signs or symptoms?  People with SHENG often worry excessively about many things in their lives, such as their health and family. They may also be overly concerned about:  · Doing well at work.  · Being on time.  · Natural disasters.  · Friendships.  Physical symptoms of SHENG include:  · Fatigue.  · Muscle tension or having muscle twitches.  · Trembling or feeling shaky.  · Being easily startled.  · Feeling like your heart is pounding or racing.  · Feeling out of breath or like you cannot take a deep breath.  · Having trouble falling asleep or staying asleep.  · Sweating.  · Nausea, diarrhea, or irritable bowel syndrome (IBS).  · Headaches.  · Trouble concentrating or remembering facts.  · Restlessness.  · Irritability.  How is this diagnosed?  Your health care provider can diagnose SHENG based on your symptoms and medical history. You will also have a physical exam. The health care provider will ask specific questions about your symptoms, including how severe they are, when they started, and if they come and go. Your health care  provider may ask you about your use of alcohol or drugs, including prescription medicines. Your health care provider may refer you to a mental health specialist for further evaluation.  Your health care provider will do a thorough examination and may perform additional tests to rule out other possible causes of your symptoms.  To be diagnosed with SHENG, a person must have anxiety that:  · Is out of his or her control.  · Affects several different aspects of his or her life, such as work and relationships.  · Causes distress that makes him or her unable to take part in normal activities.  · Includes at least three physical symptoms of SHENG, such as restlessness, fatigue, trouble concentrating, irritability, muscle tension, or sleep problems.  Before your health care provider can confirm a diagnosis of SHENG, these symptoms must be present more days than they are not, and they must last for six months or longer.  How is this treated?  The following therapies are usually used to treat SHENG:  · Medicine. Antidepressant medicine is usually prescribed for long-term daily control. Antianxiety medicines may be added in severe cases, especially when panic attacks occur.  · Talk therapy (psychotherapy). Certain types of talk therapy can be helpful in treating SHENG by providing support, education, and guidance. Options include:  ? Cognitive behavioral therapy (CBT). People learn coping skills and techniques to ease their anxiety. They learn to identify unrealistic or negative thoughts and behaviors and to replace them with positive ones.  ? Acceptance and commitment therapy (ACT). This treatment teaches people how to be mindful as a way to cope with unwanted thoughts and feelings.  ? Biofeedback. This process trains you to manage your body's response (physiological response) through breathing techniques and relaxation methods. You will work with a therapist while machines are used to monitor your physical symptoms.  · Stress  management techniques. These include yoga, meditation, and exercise.  A mental health specialist can help determine which treatment is best for you. Some people see improvement with one type of therapy. However, other people require a combination of therapies.  Follow these instructions at home:  · Take over-the-counter and prescription medicines only as told by your health care provider.  · Try to maintain a normal routine.  · Try to anticipate stressful situations and allow extra time to manage them.  · Practice any stress management or self-calming techniques as taught by your health care provider.  · Do not punish yourself for setbacks or for not making progress.  · Try to recognize your accomplishments, even if they are small.  · Keep all follow-up visits as told by your health care provider. This is important.  Contact a health care provider if:  · Your symptoms do not get better.  · Your symptoms get worse.  · You have signs of depression, such as:  ? A persistently sad, cranky, or irritable mood.  ? Loss of enjoyment in activities that used to bring you bella.  ? Change in weight or eating.  ? Changes in sleeping habits.  ? Avoiding friends or family members.  ? Loss of energy for normal tasks.  ? Feelings of guilt or worthlessness.  Get help right away if:  · You have serious thoughts about hurting yourself or others.  If you ever feel like you may hurt yourself or others, or have thoughts about taking your own life, get help right away. You can go to your nearest emergency department or call:  · Your local emergency services (911 in the [place]  · A suicide crisis helpline, such as the National Suicide Prevention Lifeline at 1-411.749.3745. This is open 24 hours a day.  Summary  · Generalized anxiety disorder (SHENG) is a mental health disorder that involves worry that is not triggered by a specific event.  · People with SHENG often worry excessively about many things in their lives, such as their health and  family.  · SHENG may cause physical symptoms such as restlessness, trouble concentrating, sleep problems, frequent sweating, nausea, diarrhea, headaches, and trembling or muscle twitching.  · A mental health specialist can help determine which treatment is best for you. Some people see improvement with one type of therapy. However, other people require a combination of therapies.  This information is not intended to replace advice given to you by your health care provider. Make sure you discuss any questions you have with your health care provider.  Document Released: 04/14/2014 Document Revised: 11/07/2017 Document Reviewed: 11/07/2017  [employer] Interactive Patient Education © 2019 [employer]    Suspect Essential HTN.Good BP control is encouraged with Goal BP based on JNC 8 guidelines 2014 <140/90 for patients with known cardiac disease and diabetes. (MARQUIS. 2014:322 (5):507-520. doi:[id number]/marquis.2013.62382): general population <60 yr old goal BP <140/90 and for those >60 <150/90.  For patients of all ages with Diabetes, CKD, Known CAD <140/90. Recommended to the patient to obtain electronic home BP machine with upper arm blood pressure cuff and to check regularly as instructed.  Keep BP log and bring to subsequent visits. Stable, at goal.  a. LABS: routine for hypertension recommended and ordered if necessary.  b. Recommend if you do not have a home BP machine to obtain an electronic machine with arm blood pressure cuff.      c. Monitor BP over the next week and keep log to bring back to office. Discussed medication therapy however pt wants to try to control with diet exercise. .  Your provider  has recommended self-monitoring of your blood pressure.  If you do not have a blood pressure cuff you may purchase one from the local pharmacy.  You may ask the pharmacist which brand and model they recommend.  Obtain your blood pressure measurement at least 2x per week.  You should also check your blood pressure if you  experience any symptoms of blurred visit, dizziness or headache.  Please record all blood pressure measurements and bring them to next office visit.  If you have any questions about the accuracy of your blood pressure machine please bring it in to the office and our staff will be happy to check accuracy.   d. Encouraged to eat a low sodium heart healthy diet  e. Offered handout on HTN educational topics.  These were provided if patient requested these today.  f. MEDS: as listed in today's visit.  g. Risks/benefits of current and new medications discussed with the patient and or family today.  The patient/family are aware and accept that if there any side effects they should call or return to clinic as soon as possible.  Appropriate F/U discussed for topics addressed today. All questions were answered to the  satisfactory state of patient/family.  Should symptoms fail to improve or worsen they agree to call or return to clinic or to go to the ER. Education handouts were offered on any new Rx if requested.  Discussed the importance of following up with any needed screening tests/labs/specialist appointments and any requested follow-up recommended by me today.  Importance of maintaining follow-up discussed and patient accepts that missed appointments can delay diagnosis and potentially lead to worsening of conditions.      Calorie Counting for Weight Loss  Calories are units of energy. Your body needs a certain amount of calories from food to keep you going throughout the day. When you eat more calories than your body needs, your body stores the extra calories as fat. When you eat fewer calories than your body needs, your body burns fat to get the energy it needs.  Calorie counting means keeping track of how many calories you eat and drink each day. Calorie counting can be helpful if you need to lose weight. If you make sure to eat fewer calories than your body needs, you should lose weight. Ask your health care  provider what a healthy weight is for you.  For calorie counting to work, you will need to eat the right number of calories in a day in order to lose a healthy amount of weight per week. A dietitian can help you determine how many calories you need in a day and will give you suggestions on how to reach your calorie goal.  · A healthy amount of weight to lose per week is usually 1-2 lb (0.5-0.9 kg). This usually means that your daily calorie intake should be reduced by 500-750 calories.  · Eating 1,200 - 1,500 calories per day can help most women lose weight.  · Eating 1,500 - 1,800 calories per day can help most men lose weight.  What is my plan?  My goal is to have __________ calories per day.  If I have this many calories per day, I should lose around __________ pounds per week.  What do I need to know about calorie counting?  In order to meet your daily calorie goal, you will need to:  · Find out how many calories are in each food you would like to eat. Try to do this before you eat.  · Decide how much of the food you plan to eat.  · Write down what you ate and how many calories it had. Doing this is called keeping a food log.  To successfully lose weight, it is important to balance calorie counting with a healthy lifestyle that includes regular activity. Aim for 150 minutes of moderate exercise (such as walking) or 75 minutes of vigorous exercise (such as running) each week.  Where do I find calorie information?    The number of calories in a food can be found on a Nutrition Facts label. If a food does not have a Nutrition Facts label, try to look up the calories online or ask your dietitian for help.  Remember that calories are listed per serving. If you choose to have more than one serving of a food, you will have to multiply the calories per serving by the amount of servings you plan to eat. For example, the label on a package of bread might say that a serving size is 1 slice and that there are 90 calories in  "a serving. If you eat 1 slice, you will have eaten 90 calories. If you eat 2 slices, you will have eaten 180 calories.  How do I keep a food log?  Immediately after each meal, record the following information in your food log:  · What you ate. Don't forget to include toppings, sauces, and other extras on the food.  · How much you ate. This can be measured in cups, ounces, or number of items.  · How many calories each food and drink had.  · The total number of calories in the meal.  Keep your food log near you, such as in a small notebook in your pocket, or use a mobile jyoti or website. Some programs will calculate calories for you and show you how many calories you have left for the day to meet your goal.  What are some calorie counting tips?    · Use your calories on foods and drinks that will fill you up and not leave you hungry:  ? Some examples of foods that fill you up are nuts and nut butters, vegetables, lean proteins, and high-fiber foods like whole grains. High-fiber foods are foods with more than 5 g fiber per serving.  ? Drinks such as sodas, specialty coffee drinks, alcohol, and juices have a lot of calories, yet do not fill you up.  · Eat nutritious foods and avoid empty calories. Empty calories are calories you get from foods or beverages that do not have many vitamins or protein, such as candy, sweets, and soda. It is better to have a nutritious high-calorie food (such as an avocado) than a food with few nutrients (such as a bag of chips).  · Know how many calories are in the foods you eat most often. This will help you calculate calorie counts faster.  · Pay attention to calories in drinks. Low-calorie drinks include water and unsweetened drinks.  · Pay attention to nutrition labels for \"low fat\" or \"fat free\" foods. These foods sometimes have the same amount of calories or more calories than the full fat versions. They also often have added sugar, starch, or salt, to make up for flavor that was " removed with the fat.  · Find a way of tracking calories that works for you. Get creative. Try different apps or programs if writing down calories does not work for you.  What are some portion control tips?  · Know how many calories are in a serving. This will help you know how many servings of a certain food you can have.  · Use a measuring cup to measure serving sizes. You could also try weighing out portions on a kitchen scale. With time, you will be able to estimate serving sizes for some foods.  · Take some time to put servings of different foods on your favorite plates, bowls, and cups so you know what a serving looks like.  · Try not to eat straight from a bag or box. Doing this can lead to overeating. Put the amount you would like to eat in a cup or on a plate to make sure you are eating the right portion.  · Use smaller plates, glasses, and bowls to prevent overeating.  · Try not to multitask (for example, watch TV or use your computer) while eating. If it is time to eat, sit down at a table and enjoy your food. This will help you to know when you are full. It will also help you to be aware of what you are eating and how much you are eating.  What are tips for following this plan?  Reading food labels  · Check the calorie count compared to the serving size. The serving size may be smaller than what you are used to eating.  · Check the source of the calories. Make sure the food you are eating is high in vitamins and protein and low in saturated and trans fats.  Shopping  · Read nutrition labels while you shop. This will help you make healthy decisions before you decide to purchase your food.  · Make a grocery list and stick to it.  Cooking  · Try to cook your favorite foods in a healthier way. For example, try baking instead of frying.  · Use low-fat dairy products.  Meal planning  · Use more fruits and vegetables. Half of your plate should be fruits and vegetables.  · Include lean proteins like poultry and  "fish.  How do I count calories when eating out?  · Ask for smaller portion sizes.  · Consider sharing an entree and sides instead of getting your own entree.  · If you get your own entree, eat only half. Ask for a box at the beginning of your meal and put the rest of your entree in it so you are not tempted to eat it.  · If calories are listed on the menu, choose the lower calorie options.  · Choose dishes that include vegetables, fruits, whole grains, low-fat dairy products, and lean protein.  · Choose items that are boiled, broiled, grilled, or steamed. Stay away from items that are buttered, battered, fried, or served with cream sauce. Items labeled \"crispy\" are usually fried, unless stated otherwise.  · Choose water, low-fat milk, unsweetened iced tea, or other drinks without added sugar. If you want an alcoholic beverage, choose a lower calorie option such as a glass of wine or light beer.  · Ask for dressings, sauces, and syrups on the side. These are usually high in calories, so you should limit the amount you eat.  · If you want a salad, choose a garden salad and ask for grilled meats. Avoid extra toppings like warner, cheese, or fried items. Ask for the dressing on the side, or ask for olive oil and vinegar or lemon to use as dressing.  · Estimate how many servings of a food you are given. For example, a serving of cooked rice is ½ cup or about the size of half a baseball. Knowing serving sizes will help you be aware of how much food you are eating at restaurants. The list below tells you how big or small some common portion sizes are based on everyday objects:  ? 1 oz--4 stacked dice.  ? 3 oz--1 deck of cards.  ? 1 tsp--1 die.  ? 1 Tbsp--½ a ping-pong ball.  ? 2 Tbsp--1 ping-pong ball.  ? ½ cup--½ baseball.  ? 1 cup--1 baseball.  Summary  · Calorie counting means keeping track of how many calories you eat and drink each day. If you eat fewer calories than your body needs, you should lose weight.  · A " healthy amount of weight to lose per week is usually 1-2 lb (0.5-0.9 kg). This usually means reducing your daily calorie intake by 500-750 calories.  · The number of calories in a food can be found on a Nutrition Facts label. If a food does not have a Nutrition Facts label, try to look up the calories online or ask your dietitian for help.  · Use your calories on foods and drinks that will fill you up, and not on foods and drinks that will leave you hungry.  · Use smaller plates, glasses, and bowls to prevent overeating.  This information is not intended to replace advice given to you by your health care provider. Make sure you discuss any questions you have with your health care provider.  Document Released: 12/18/2006 Document Revised: 09/06/2019 Document Reviewed: 11/17/2017  [employer] Interactive Patient Education © 2019 [employer]

## 2019-12-05 DIAGNOSIS — K59.09 CHRONIC CONSTIPATION: Primary | ICD-10-CM

## 2019-12-05 LAB
ALBUMIN SERPL-MCNC: 4.3 G/DL (ref 3.6–4.8)
ALBUMIN/GLOB SERPL: 1.4 {RATIO} (ref 1.2–2.2)
ALP SERPL-CCNC: 86 IU/L (ref 39–117)
ALT SERPL-CCNC: 13 IU/L (ref 0–32)
AST SERPL-CCNC: 21 IU/L (ref 0–40)
BILIRUB SERPL-MCNC: 0.4 MG/DL (ref 0–1.2)
BUN SERPL-MCNC: 18 MG/DL (ref 8–27)
BUN/CREAT SERPL: 20 (ref 12–28)
CALCIUM SERPL-MCNC: 9.6 MG/DL (ref 8.7–10.3)
CHLORIDE SERPL-SCNC: 96 MMOL/L (ref 96–106)
CO2 SERPL-SCNC: 25 MMOL/L (ref 20–29)
CREAT SERPL-MCNC: 0.9 MG/DL (ref 0.57–1)
ERYTHROCYTE [DISTWIDTH] IN BLOOD BY AUTOMATED COUNT: 14.1 % (ref 12.3–15.4)
GLOBULIN SER CALC-MCNC: 3.1 G/DL (ref 1.5–4.5)
GLUCOSE SERPL-MCNC: 99 MG/DL (ref 65–99)
HCT VFR BLD AUTO: 42.4 % (ref 34–46.6)
HGB BLD-MCNC: 14.1 G/DL (ref 11.1–15.9)
MCH RBC QN AUTO: 32.3 PG (ref 26.6–33)
MCHC RBC AUTO-ENTMCNC: 33.3 G/DL (ref 31.5–35.7)
MCV RBC AUTO: 97 FL (ref 79–97)
PLATELET # BLD AUTO: 297 X10E3/UL (ref 150–450)
POTASSIUM SERPL-SCNC: 4.3 MMOL/L (ref 3.5–5.2)
PROT SERPL-MCNC: 7.4 G/DL (ref 6–8.5)
RBC # BLD AUTO: 4.37 X10E6/UL (ref 3.77–5.28)
SODIUM SERPL-SCNC: 139 MMOL/L (ref 134–144)
WBC # BLD AUTO: 7.3 X10E3/UL (ref 3.4–10.8)

## 2019-12-16 ENCOUNTER — TELEPHONE (OUTPATIENT)
Dept: FAMILY MEDICINE CLINIC | Facility: CLINIC | Age: 69
End: 2019-12-16

## 2019-12-16 NOTE — TELEPHONE ENCOUNTER
Pt called, stated that she is very congested and having nausea as well. She advised she is coughing up dark green, and asking that medication be sent to [facility].

## 2019-12-18 ENCOUNTER — OUTSIDE FACILITY SERVICE (OUTPATIENT)
Dept: FAMILY MEDICINE CLINIC | Facility: CLINIC | Age: 69
End: 2019-12-18

## 2019-12-18 ENCOUNTER — OFFICE VISIT (OUTPATIENT)
Dept: FAMILY MEDICINE CLINIC | Facility: CLINIC | Age: 69
End: 2019-12-18

## 2019-12-18 VITALS
DIASTOLIC BLOOD PRESSURE: 70 MMHG | SYSTOLIC BLOOD PRESSURE: 142 MMHG | OXYGEN SATURATION: 93 % | BODY MASS INDEX: 32.59 KG/M2 | HEIGHT: 60 IN | WEIGHT: 166 LBS | HEART RATE: 77 BPM

## 2019-12-18 DIAGNOSIS — J98.01 BRONCHOSPASM: Primary | ICD-10-CM

## 2019-12-18 DIAGNOSIS — J40 BRONCHITIS: ICD-10-CM

## 2019-12-18 DIAGNOSIS — J43.1 PANLOBULAR EMPHYSEMA (HCC): ICD-10-CM

## 2019-12-18 PROCEDURE — 96372 THER/PROPH/DIAG INJ SC/IM: CPT | Performed by: FAMILY MEDICINE

## 2019-12-18 PROCEDURE — 99214 OFFICE O/P EST MOD 30 MIN: CPT | Performed by: FAMILY MEDICINE

## 2019-12-18 PROCEDURE — 99219 PR INITIAL OBSERVATION CARE/DAY 50 MINUTES: CPT | Performed by: FAMILY MEDICINE

## 2019-12-18 RX ORDER — METHYLPREDNISOLONE ACETATE 80 MG/ML
80 INJECTION, SUSPENSION INTRA-ARTICULAR; INTRALESIONAL; INTRAMUSCULAR; SOFT TISSUE ONCE
Status: COMPLETED | OUTPATIENT
Start: 2019-12-18 | End: 2019-12-18

## 2019-12-18 RX ORDER — IPRATROPIUM BROMIDE AND ALBUTEROL SULFATE 2.5; .5 MG/3ML; MG/3ML
3 SOLUTION RESPIRATORY (INHALATION) ONCE
Status: DISCONTINUED | OUTPATIENT
Start: 2019-12-18 | End: 2020-01-08

## 2019-12-18 RX ADMIN — METHYLPREDNISOLONE ACETATE 80 MG: 80 INJECTION, SUSPENSION INTRA-ARTICULAR; INTRALESIONAL; INTRAMUSCULAR; SOFT TISSUE at 14:56

## 2019-12-18 NOTE — PROGRESS NOTES
OFFICE VISIT NOTE:    Shanell Coffman is a 69 y.o. female who presents today for Earache; Sore Throat; and URI.     Cough   This is a recurrent problem. The current episode started in the past 7 days. The problem has been gradually worsening. The problem occurs every few minutes. The cough is productive of purulent sputum. Associated symptoms include shortness of breath. Pertinent negatives include no chest pain, fever, hemoptysis, rash or weight loss. The symptoms are aggravated by cold air, exercise and stress. She has tried a beta-agonist inhaler, body position changes, cool air, OTC cough suppressant and rest for the symptoms. The treatment provided moderate relief.        Past medical/surgical history, Family history, Social history, Allergies and Medications have been reviewed with the patient today and are updated in [facility] EMR. See below.    Past Medical History:   Diagnosis Date   • Anxiety    • Chronic pain    • COPD (chronic obstructive pulmonary disease) (CMS/[facility])    • GERD (gastroesophageal reflux disease)    • Hyperlipidemia      Past Surgical History:   Procedure Laterality Date   • APPENDECTOMY     • CHOLECYSTECTOMY     • COLON SURGERY     • TUBAL ABDOMINAL LIGATION       Family History   Problem Relation Age of Onset   • Heart disease Mother    • Hyperlipidemia Mother    • Hypertension Mother    • Cancer Father    • Mental illness Father    • Diabetes Maternal Grandmother    • Heart attack Maternal Grandfather    • Obesity Paternal Grandmother    • Stroke Paternal Grandfather      Social History     Tobacco Use   • Smoking status: Never Smoker   • Smokeless tobacco: Never Used   Substance Use Topics   • Alcohol use: No     Frequency: Never     Binge frequency: Never   • Drug use: No       ALLERGIES:  Ceclor [cefaclor]; Keflex [cephalexin]; Stadol [butorphanol]; Sulfa antibiotics; and Seroquel [quetiapine]    CURRENT MEDS:    Current Outpatient Medications:   •  albuterol (PROVENTIL) (2.5 MG/3ML) 0.083%  nebulizer solution, USE 3 ML PER NEBULIZER 4 TIMES A DAY, Disp: 375 mL, Rfl: 0  •  ALPRAZolam (XANAX) 1 MG tablet, Take 1 tablet by mouth 3 (Three) Times a Day As Needed for Anxiety., Disp: 90 tablet, Rfl: 2  •  amLODIPine (NORVASC) 10 MG tablet, Take 1 tablet by mouth Daily., Disp: 90 tablet, Rfl: 1  •  baclofen (LIORESAL) 10 MG tablet, TAKE 1 TABLET BY MOUTH EVERY DAY, Disp: 30 tablet, Rfl: 2  •  Calcium Carbonate-Vitamin D (OYSTER SHELL CALCIUM 500 + D) 500-125 MG-UNIT tablet, TAKE 1 TABLET BY MOUTH WITH FOOD, Disp: 90 each, Rfl: 2  •  cloNIDine (CATAPRES) 0.2 MG tablet, TAKE 1 TABLET BY MOUTH 2 (TWO) TIMES A DAY. BEGIN EVENING OF 4/22/19, AND DC PATCHES, Disp: 180 tablet, Rfl: 1  •  dicyclomine (BENTYL) 20 MG tablet, TAKE 1 TABLET BY MOUTH 4 TIMES A DAY PRB, Disp: , Rfl: 2  •  doxepin (SINEquan) 50 MG capsule, TAKE 1 CAPSULE BY MOUTH EVERY DAY AT NIGHT, Disp: 30 capsule, Rfl: 5  •  EPINEPHrine (EPIPEN) 0.3 MG/0.3ML solution auto-injector injection, INJECT 0.3 ML INTO THE APPROPRIATE MUSCLE AS DIRECTED BY PRESCRIBER 1 (ONE) TIME FOR 1 DOSE., Disp: , Rfl: 1  •  escitalopram (LEXAPRO) 20 MG tablet, Take 1 tablet by mouth Every Night., Disp: 30 tablet, Rfl: 5  •  fexofenadine-pseudoephedrine (ALLEGRA-D)  MG per 12 hr tablet, Take 1 tablet by mouth 2 (Two) Times a Day As Needed for Allergies (sinus congestion)., Disp: 30 tablet, Rfl: 2  •  furosemide (LASIX) 40 MG tablet, Take 1 tablet by mouth Daily., Disp: 30 tablet, Rfl: 5  •  ketoconazole (NIZORAL) 2 % cream, Apply  topically to the appropriate area as directed Daily., Disp: 60 g, Rfl: 5  •  linaclotide (LINZESS) 72 MCG capsule capsule, Take 1 capsule by mouth Every Morning Before Breakfast., Disp: 30 capsule, Rfl: 5  •  losartan (COZAAR) 50 MG tablet, Take 100 mg by mouth Daily., Disp: , Rfl:   •  metoclopramide (REGLAN) 5 MG tablet, TAKE 1 TABLET BY MOUTH 4 TIMES A DAY AS NEEDED, Disp: 120 tablet, Rfl: 5  •  metoprolol succinate XL (TOPROL-XL) 25 MG 24 hr  tablet, Take 1 tablet by mouth Daily., Disp: 30 tablet, Rfl: 5  •  mirtazapine (REMERON) 15 MG tablet, Take 15 mg by mouth every night at bedtime., Disp: , Rfl: 3  •  nabumetone (RELAFEN) 500 MG tablet, Take 1 tablet by mouth 2 (Two) Times a Day As Needed for Moderate Pain ., Disp: 60 tablet, Rfl: 5  •  nystatin (MYCOSTATIN) 507228 UNIT/GM powder, Apply  topically to the appropriate area as directed 3 (Three) Times a Day., Disp: 60 g, Rfl: 3  •  O2 (OXYGEN), Inhale 2 L/min 1 (One) Time., Disp: , Rfl:   •  ondansetron ODT (ZOFRAN ODT) 4 MG disintegrating tablet, Take 1 tablet by mouth Every 8 (Eight) Hours As Needed for Nausea or Vomiting., Disp: 30 tablet, Rfl: 1  •  pantoprazole (PROTONIX) 40 MG EC tablet, Take 40 mg by mouth Daily., Disp: , Rfl:   •  potassium chloride ER (K-TAB) 20 MEQ tablet controlled-release ER tablet, Take 1 tablet by mouth Daily., Disp: 90 tablet, Rfl: 1  •  promethazine (PHENERGAN) 25 MG tablet, TAKE 1 TABLET(S) BY MOUTH DAILY AS NEEDED NAUSEA/VOMITING, Disp: , Rfl: 5  •  promethazine-codeine (PHENERGAN with CODEINE) 6.25-10 MG/5ML syrup, Take 5 mL by mouth Every 4 (Four) Hours As Needed for Cough., Disp: 240 mL, Rfl: 0  •  spironolactone (ALDACTONE) 25 MG tablet, Take 1 tablet by mouth 2 (Two) Times a Day As Needed (swelling or elevated BP)., Disp: 180 tablet, Rfl: 1  •  terbinafine (LAMISIL) 250 MG tablet, Take 1 tablet by mouth Daily., Disp: 14 tablet, Rfl: 2  •  tiotropium bromide monohydrate (SPIRIVA RESPIMAT) 2.5 MCG/ACT aerosol solution inhaler, Inhale 2 puffs Daily., Disp: 4 g, Rfl: 5  •  VENTOLIN  (90 Base) MCG/ACT inhaler, INHALE 2 PUFFS EVERY 4 (FOUR) HOURS AS NEEDED FOR WHEEZING OR SHORTNESS OF AIR., Disp: 18 inhaler, Rfl: 5  •  dilTIAZem (TIAZAC) 360 MG 24 hr capsule, TAKE 1 CAPSULE BY MOUTH EVERY DAY, Disp: 90 capsule, Rfl: 1    Current Facility-Administered Medications:   •  albuterol (PROVENTIL) nebulizer solution 0.083% 2.5 mg/3mL, 2.5 mg, Nebulization, Once, French,  "Reji Albrecht MD  •  ipratropium-albuterol (DUO-NEB) nebulizer solution 3 mL, 3 mL, Nebulization, Once, Reji Jacobs MD    REVIEW OF SYSTEMS:  Review of Systems   Constitutional: Negative for activity change, fatigue, fever, unexpected weight gain and unexpected weight loss.   Respiratory: Positive for cough and shortness of breath. Negative for hemoptysis.    Cardiovascular: Negative for chest pain.   Gastrointestinal: Negative for abdominal pain.   Genitourinary: Negative for difficulty urinating.   Skin: Negative for rash.   Neurological: Negative for syncope and headache.       PHYSICAL EXAMINATION:  Vital Signs:  /70 (BP Location: Left arm, Patient Position: Sitting, Cuff Size: Adult)   Pulse 77   Ht 152.4 cm (60\")   Wt 75.3 kg (166 lb)   LMP  (LMP Unknown)   SpO2 93%   Breastfeeding No   BMI 32.42 kg/m²   Physical Exam   Constitutional: She is oriented to person, place, and time. She appears well-developed and well-nourished. No distress.   HENT:   Head: Normocephalic and atraumatic.   Mouth/Throat: Oropharynx is clear and moist.   Neck: Normal range of motion. Neck supple. No JVD present.   Cardiovascular: Normal rate, regular rhythm, normal heart sounds and intact distal pulses.   Pulmonary/Chest: She is in respiratory distress (initially, but after neb treatment in office, less wheezes, but scattered rhonchi remained.). She has wheezes. She has rales.   Musculoskeletal: Normal range of motion. She exhibits no edema.   Neurological: She is alert and oriented to person, place, and time. No cranial nerve deficit.   Skin: Skin is warm and dry. Capillary refill takes less than 2 seconds. No rash noted.   Psychiatric: She has a normal mood and affect. Her behavior is normal.   Nursing note and vitals reviewed.      Procedures    ASSESSMENT/ PLAN:  Problem List Items Addressed This Visit        Respiratory    Panlobular emphysema (CMS/HCC)    Relevant Medications    methylPREDNISolone acetate " (DEPO-medrol) injection 80 mg (Completed)      Other Visit Diagnoses     Bronchospasm    -  Primary    Relevant Medications    ipratropium-albuterol (DUO-NEB) nebulizer solution 3 mL    Bronchitis        Relevant Medications    methylPREDNISolone acetate (DEPO-medrol) injection 80 mg (Completed)        To [facility] [place] - failed outpatient treatment.     Specific Patient Instructions:  MEDICATION Instructions: Encouraged patient to continue routine medicines as prescribed and maintain compliance. Patient instructed to report any adverse side effects or reactions to medicines promptly to the office. Patient instructed to make us aware of any OTC or herbal meds or supplement use.  DIET Recommendations: Patient instructed and provided information on the following nutrition and DIET(s): Calorie restriction for weight reduction and maintenance. Necessity for adequate daily intake of fluids/water.  EXERCISE Instructions: Discussed with patient the need for routine aerobic activity for cardiovascular fitness, 3 times a week for about 30 minutes. Daily exercise for increased fitness and weight reduction goals.    Patient's Body mass index is 32.42 kg/m². BMI is above normal parameters. Recommendations include: exercise counseling and nutrition counseling.      SMOKING Recommendations: Counseled patient and encouraged them on smoking cessation. Discussed the benefits to all body systems with smoking cessation, including decreased cardiac/lung/stroke/cancer risk.  HEALTH MAINTENANCE:  Counseling provided to patient/family about routine health maintenance and ANNUAL physicals/labs. Counseling on recommended Vaccinations appropriate for age needed.  MISCELLANEOUS Instructions: N/A      Medications or Orders placed this visit:  No orders of the defined types were placed in this encounter.      Medications DISCONTINUED this visit:  There are no discontinued medications.    FOLLOW-UP:  Return in about 2 weeks (around  1/1/2020) for Recheck.    I discussed the patients findings and my recommendations with patient.  An After Visit Summary (AVS) was printed and given to the patient at discharge.      Reji Jacobs MD, FAAFP  12/24/2019

## 2019-12-19 DIAGNOSIS — I10 ESSENTIAL HYPERTENSION: ICD-10-CM

## 2019-12-19 PROCEDURE — 99225 PR SBSQ OBSERVATION CARE/DAY 25 MINUTES: CPT | Performed by: FAMILY MEDICINE

## 2019-12-19 RX ORDER — DILTIAZEM HYDROCHLORIDE 360 MG/1
CAPSULE, EXTENDED RELEASE ORAL
Qty: 90 CAPSULE | Refills: 1 | Status: SHIPPED | OUTPATIENT
Start: 2019-12-19 | End: 2021-04-07 | Stop reason: ALTCHOICE

## 2019-12-20 PROCEDURE — 99225 PR SBSQ OBSERVATION CARE/DAY 25 MINUTES: CPT | Performed by: FAMILY MEDICINE

## 2019-12-24 PROCEDURE — 99217 PR OBSERVATION CARE DISCHARGE MANAGEMENT: CPT | Performed by: FAMILY MEDICINE

## 2019-12-26 ENCOUNTER — TRANSITIONAL CARE MANAGEMENT TELEPHONE ENCOUNTER (OUTPATIENT)
Dept: FAMILY MEDICINE CLINIC | Facility: CLINIC | Age: 69
End: 2019-12-26

## 2020-01-08 ENCOUNTER — OFFICE VISIT (OUTPATIENT)
Dept: FAMILY MEDICINE CLINIC | Facility: CLINIC | Age: 70
End: 2020-01-08

## 2020-01-08 VITALS
HEIGHT: 60 IN | DIASTOLIC BLOOD PRESSURE: 86 MMHG | BODY MASS INDEX: 33.81 KG/M2 | SYSTOLIC BLOOD PRESSURE: 174 MMHG | OXYGEN SATURATION: 94 % | HEART RATE: 74 BPM | WEIGHT: 172.2 LBS

## 2020-01-08 DIAGNOSIS — R06.03 ACUTE RESPIRATORY DISTRESS: Primary | ICD-10-CM

## 2020-01-08 DIAGNOSIS — J43.1 PANLOBULAR EMPHYSEMA (HCC): ICD-10-CM

## 2020-01-08 DIAGNOSIS — J41.8 MIXED SIMPLE AND MUCOPURULENT CHRONIC BRONCHITIS (HCC): ICD-10-CM

## 2020-01-08 DIAGNOSIS — M47.819 ARTHRITIS, LOW BACK: ICD-10-CM

## 2020-01-08 DIAGNOSIS — F32.9 REACTIVE DEPRESSION: ICD-10-CM

## 2020-01-08 DIAGNOSIS — M62.830 LUMBAR PARASPINAL MUSCLE SPASM: ICD-10-CM

## 2020-01-08 DIAGNOSIS — I10 ESSENTIAL HYPERTENSION: ICD-10-CM

## 2020-01-08 DIAGNOSIS — F41.9 ANXIETY: ICD-10-CM

## 2020-01-08 PROCEDURE — 99495 TRANSJ CARE MGMT MOD F2F 14D: CPT | Performed by: FAMILY MEDICINE

## 2020-01-08 RX ORDER — IPRATROPIUM BROMIDE AND ALBUTEROL SULFATE 2.5; .5 MG/3ML; MG/3ML
SOLUTION RESPIRATORY (INHALATION)
COMMUNITY
Start: 2019-12-24 | End: 2021-04-07 | Stop reason: SDUPTHER

## 2020-01-08 NOTE — PROGRESS NOTES
[facility]  TRANSITIONAL CARE MANAGEMENT VISIT         DISCHARGING PHYSICIAN: French  DISCHARGE DATE: 12/18/19-12/24/19  DISCHARGE from FACILITY: [facility]    DISCHARGE DIAGNOSES:  There are no active hospital problems to display for this patient.      In [facility] for COPD exacerbation. Tolerated Nebs and oxygen well, with IV steroids, and eventually DC'd home.       INITIAL TCM Phone Encounter was made on 12/26/19.    FACE-to-FACE evaluation performed on 01/08/2020.  This was within within 14 (Medium Complexity) days of discharge.    MEDICATION RECONCILIATION: Medication list was reviewed and reconciled, and new list provided to patient/family/caregiver via AVS.    ALLERGIES:  Ceclor [cefaclor]; Keflex [cephalexin]; Stadol [butorphanol]; Sulfa antibiotics; and Seroquel [quetiapine]    CURRENT MEDICATION LIST:    Current Outpatient Medications:   •  albuterol (PROVENTIL) (2.5 MG/3ML) 0.083% nebulizer solution, USE 3 ML PER NEBULIZER 4 TIMES A DAY, Disp: 375 mL, Rfl: 0  •  ALPRAZolam (XANAX) 1 MG tablet, Take 1 tablet by mouth 3 (Three) Times a Day As Needed for Anxiety., Disp: 90 tablet, Rfl: 2  •  amLODIPine (NORVASC) 10 MG tablet, Take 1 tablet by mouth Daily., Disp: 90 tablet, Rfl: 1  •  baclofen (LIORESAL) 10 MG tablet, TAKE 1 TABLET BY MOUTH EVERY DAY, Disp: 30 tablet, Rfl: 2  •  Calcium Carbonate-Vitamin D (OYSTER SHELL CALCIUM 500 + D) 500-125 MG-UNIT tablet, TAKE 1 TABLET BY MOUTH WITH FOOD, Disp: 90 each, Rfl: 2  •  cloNIDine (CATAPRES) 0.2 MG tablet, TAKE 1 TABLET BY MOUTH 2 (TWO) TIMES A DAY. BEGIN EVENING OF 4/22/19, AND DC PATCHES, Disp: 180 tablet, Rfl: 1  •  dicyclomine (BENTYL) 20 MG tablet, TAKE 1 TABLET BY MOUTH 4 TIMES A DAY PRB, Disp: , Rfl: 2  •  dilTIAZem (TIAZAC) 360 MG 24 hr capsule, TAKE 1 CAPSULE BY MOUTH EVERY DAY, Disp: 90 capsule, Rfl: 1  •  doxepin (SINEquan) 50 MG capsule, TAKE 1 CAPSULE BY MOUTH EVERY DAY AT NIGHT, Disp: 30  capsule, Rfl: 5  •  EPINEPHrine (EPIPEN) 0.3 MG/0.3ML solution auto-injector injection, INJECT 0.3 ML INTO THE APPROPRIATE MUSCLE AS DIRECTED BY PRESCRIBER 1 (ONE) TIME FOR 1 DOSE., Disp: , Rfl: 1  •  escitalopram (LEXAPRO) 20 MG tablet, Take 1 tablet by mouth Every Night., Disp: 30 tablet, Rfl: 5  •  fexofenadine-pseudoephedrine (ALLEGRA-D)  MG per 12 hr tablet, Take 1 tablet by mouth 2 (Two) Times a Day As Needed for Allergies (sinus congestion)., Disp: 30 tablet, Rfl: 2  •  furosemide (LASIX) 40 MG tablet, Take 1 tablet by mouth Daily., Disp: 30 tablet, Rfl: 5  •  ipratropium-albuterol (DUO-NEB) 0.5-2.5 mg/3 ml nebulizer, USE 1 VIAL PER NEBULIZER FOUR TIMES DAILY, Disp: , Rfl:   •  ketoconazole (NIZORAL) 2 % cream, Apply  topically to the appropriate area as directed Daily., Disp: 60 g, Rfl: 5  •  linaclotide (LINZESS) 72 MCG capsule capsule, Take 1 capsule by mouth Every Morning Before Breakfast., Disp: 30 capsule, Rfl: 5  •  losartan (COZAAR) 50 MG tablet, Take 100 mg by mouth Daily., Disp: , Rfl:   •  metoclopramide (REGLAN) 5 MG tablet, TAKE 1 TABLET BY MOUTH 4 TIMES A DAY AS NEEDED, Disp: 120 tablet, Rfl: 5  •  metoprolol succinate XL (TOPROL-XL) 25 MG 24 hr tablet, Take 1 tablet by mouth Daily., Disp: 30 tablet, Rfl: 5  •  mirtazapine (REMERON) 15 MG tablet, Take 15 mg by mouth every night at bedtime., Disp: , Rfl: 3  •  nabumetone (RELAFEN) 500 MG tablet, Take 1 tablet by mouth 2 (Two) Times a Day As Needed for Moderate Pain ., Disp: 60 tablet, Rfl: 5  •  nystatin (MYCOSTATIN) 129577 UNIT/GM powder, Apply  topically to the appropriate area as directed 3 (Three) Times a Day., Disp: 60 g, Rfl: 3  •  O2 (OXYGEN), Inhale 2 L/min 1 (One) Time., Disp: , Rfl:   •  ondansetron ODT (ZOFRAN ODT) 4 MG disintegrating tablet, Take 1 tablet by mouth Every 8 (Eight) Hours As Needed for Nausea or Vomiting., Disp: 30 tablet, Rfl: 1  •  pantoprazole (PROTONIX) 40 MG EC tablet, Take 40 mg by mouth Daily., Disp: , Rfl:  "  •  potassium chloride ER (K-TAB) 20 MEQ tablet controlled-release ER tablet, Take 1 tablet by mouth Daily., Disp: 90 tablet, Rfl: 1  •  promethazine (PHENERGAN) 25 MG tablet, TAKE 1 TABLET(S) BY MOUTH DAILY AS NEEDED NAUSEA/VOMITING, Disp: , Rfl: 5  •  spironolactone (ALDACTONE) 25 MG tablet, Take 1 tablet by mouth 2 (Two) Times a Day As Needed (swelling or elevated BP)., Disp: 180 tablet, Rfl: 1  •  terbinafine (LAMISIL) 250 MG tablet, Take 1 tablet by mouth Daily., Disp: 14 tablet, Rfl: 2  •  tiotropium bromide monohydrate (SPIRIVA RESPIMAT) 2.5 MCG/ACT aerosol solution inhaler, Inhale 2 puffs Daily., Disp: 4 g, Rfl: 5  •  VENTOLIN  (90 Base) MCG/ACT inhaler, INHALE 2 PUFFS EVERY 4 (FOUR) HOURS AS NEEDED FOR WHEEZING OR SHORTNESS OF AIR., Disp: 18 inhaler, Rfl: 5    ROS:  Review of Systems   Constitutional: Negative for activity change, fatigue, fever, unexpected weight gain and unexpected weight loss.   Respiratory: Positive for cough (NP), shortness of breath and wheezing.    Cardiovascular: Negative for chest pain, palpitations and leg swelling.   Gastrointestinal: Negative for abdominal pain.   Genitourinary: Negative for difficulty urinating.   Skin: Negative for rash.   Neurological: Negative for syncope and headache.       PATIENT EXAM:  Vital Signs:  /86 (BP Location: Left arm, Patient Position: Sitting, Cuff Size: Adult)   Pulse 74   Ht 152.4 cm (60\")   Wt 78.1 kg (172 lb 3.2 oz)   LMP  (LMP Unknown)   SpO2 94%   Breastfeeding No   BMI 33.63 kg/m²   Physical Exam   Constitutional: She is oriented to person, place, and time. She appears well-developed and well-nourished. No distress.   HENT:   Head: Normocephalic and atraumatic.   Mouth/Throat: Oropharynx is clear and moist.   Neck: Normal range of motion. Neck supple. No JVD present.   Cardiovascular: Normal rate, regular rhythm, normal heart sounds and intact distal pulses.   Pulmonary/Chest: Effort normal. No respiratory distress. " She has wheezes (rare fine scattered today).   Musculoskeletal: Normal range of motion. She exhibits no edema.   Neurological: She is alert and oriented to person, place, and time. No cranial nerve deficit.   Skin: Skin is warm and dry. Capillary refill takes less than 2 seconds. No rash noted.   Psychiatric: She has a normal mood and affect. Her behavior is normal.   Nursing note and vitals reviewed.       ASSESSMENT:  Diagnoses and all orders for this visit:    1. Acute respiratory distress (Primary)    2. Essential hypertension    3. Panlobular emphysema (CMS/HCC)    4. Arthritis, low back    5. Lumbar paraspinal muscle spasm    6. Anxiety    7. Reactive depression    8. Mixed simple and mucopurulent chronic bronchitis (CMS/HCC)          REFERRALS:  Pulmonology  Nephrology       LABS and/or ADDITIONAL TESTS NEEDED:  No orders of the defined types were placed in this encounter.      DURABLE MEDICAL EQUIPMENT (DME):  None Needed    COMMUNITY RESOURCES IDENTIFIED FOR PATIENT/FAMILY:  None Needed.    ADDITIONAL COMMUNICATION DELIVERED OR PLANNED:  Please refer to AVS for specifics of Patient Education, Updated Medication List, Handouts, Referrals. As necessary, information was made available or sent to Specialists or members of Care Team.    COMPLEXITY OF MEDICAL DECISION MAKING:  Moderate Complexity (66400)  (Considerations: Number of diagnoses, number of management options considered, amount and/or complexity of medical records/diagnostic tests, and/or other information obtained/reviewed/analyzed. Risk of significant complications, morbidity, and/or mortality as well as co-morbidities associated with presenting problems, diagnostic procedures, and/or the possible management options).

## 2020-01-08 NOTE — PATIENT INSTRUCTIONS
Chronic Obstructive Pulmonary Disease    Chronic obstructive pulmonary disease (COPD) is a long-term (chronic) condition that affects the lungs. COPD is a general term that can be used to describe many different lung problems that cause lung swelling (inflammation) and limit airflow, including chronic bronchitis and emphysema. If you have COPD, your lung function will probably never return to normal. In most cases, it gets worse over time. However, there are steps you can take to slow the progression of the disease and improve your quality of life.  What are the causes?  This condition may be caused by:  · Smoking. This is the most common cause.  · Certain genes passed down through families.  What increases the risk?  The following factors may make you more likely to develop this condition:  · Secondhand smoke from cigarettes, pipes, or cigars.  · Exposure to chemicals and other irritants such as fumes and dust in the work environment.  · Chronic lung conditions or infections.  What are the signs or symptoms?  Symptoms of this condition include:  · Shortness of breath, especially during physical activity.  · Chronic cough with a large amount of thick mucus. Sometimes the cough may not have any mucus (dry cough).  · Wheezing.  · Rapid breaths.  · Gray or bluish discoloration (cyanosis) of the skin, especially in your fingers, toes, or lips.  · Feeling tired (fatigue).  · Weight loss.  · Chest tightness.  · Frequent infections.  · Episodes when breathing symptoms become much worse (exacerbations).  · Swelling in the ankles, feet, or legs. This may occur in later stages of the disease.  How is this diagnosed?  This condition is diagnosed based on:  · Your medical history.  · A physical exam.  You may also have tests, including:  · Lung (pulmonary) function tests. This may include a spirometry test, which measures your ability to exhale properly.  · Chest X-ray.  · CT scan.  · Blood tests.  How is this treated?  This  condition may be treated with:  · Medicines. These may include inhaled rescue medicines to treat acute exacerbations as well as long-term, or maintenance, medicines to prevent flare-ups of COPD.  ? Bronchodilators help treat COPD by dilating the airways to allow increased airflow and make your breathing more comfortable.  ? Steroids can reduce airway inflammation and help prevent exacerbations.  · Smoking cessation. If you smoke, your health care provider may ask you to quit, and may also recommend therapy or replacement products to help you quit.  · Pulmonary rehabilitation. This may involve working with a team of health care providers and specialists, such as respiratory, occupational, and physical therapists.  · Exercise and physical activity. These are beneficial for nearly all people with COPD.  · Nutrition therapy to gain weight, if you are underweight.  · Oxygen. Supplemental oxygen therapy is only helpful if you have a low oxygen level in your blood (hypoxemia).  · Lung surgery or transplant.  · Palliative care. This is to help people with COPD feel comfortable when treatment is no longer working.  Follow these instructions at home:  Medicines  · Take over-the-counter and prescription medicines (inhaled or pills) only as told by your health care provider.  · Talk to your health care provider before taking any cough or allergy medicines. You may need to avoid certain medicines that dry out your airways.  Lifestyle  · If you are a smoker, the most important thing that you can do is to stop smoking. Do not use any products that contain nicotine or tobacco, such as cigarettes and e-cigarettes. If you need help quitting, ask your health care provider. Continuing to smoke will cause the disease to progress faster.  · Avoid exposure to things that irritate your lungs, such as smoke, chemicals, and fumes.  · Stay active, but balance activity with periods of rest. Exercise and physical activity will help you maintain  your ability to do things you want to do.  · Learn and use relaxation techniques to manage stress and to control your breathing.  · Get the right amount of sleep and get quality sleep. Most adults need 7 or more hours per night.  · Eat healthy foods. Eating smaller, more frequent meals and resting before meals may help you maintain your strength.  Controlled breathing  Learn and use controlled breathing techniques as directed by your health care provider. Controlled breathing techniques include:  · Pursed lip breathing. Start by breathing in (inhaling) through your nose for 1 second. Then, purse your lips as if you were going to whistle and breathe out (exhale) through the pursed lips for 2 seconds.  · Diaphragmatic breathing. Start by putting one hand on your abdomen just above your waist. Inhale slowly through your nose. The hand on your abdomen should move out. Then purse your lips and exhale slowly. You should be able to feel the hand on your abdomen moving in as you exhale.  Controlled coughing  Learn and use controlled coughing to clear mucus from your lungs. Controlled coughing is a series of short, progressive coughs. The steps of controlled coughing are:  1. Lean your head slightly forward.  2. Breathe in deeply using diaphragmatic breathing.  3. Try to hold your breath for 3 seconds.  4. Keep your mouth slightly open while coughing twice.  5. Spit any mucus out into a tissue.  6. Rest and repeat the steps once or twice as needed.  General instructions  · Make sure you receive all the vaccines that your health care provider recommends, especially the pneumococcal and influenza vaccines. Preventing infection and hospitalization is very important when you have COPD.  · Use oxygen therapy and pulmonary rehabilitation if directed to by your health care provider. If you require home oxygen therapy, ask your health care provider whether you should purchase a pulse oximeter to measure your oxygen level at  home.  · Work with your health care provider to develop a COPD action plan. This will help you know what steps to take if your condition gets worse.  · Keep other chronic health conditions under control as told by your health care provider.  · Avoid extreme temperature and humidity changes.  · Avoid contact with people who have an illness that spreads from person to person (is contagious), such as viral infections or pneumonia.  · Keep all follow-up visits as told by your health care provider. This is important.  Contact a health care provider if:  · You are coughing up more mucus than usual.  · There is a change in the color or thickness of your mucus.  · Your breathing is more labored than usual.  · Your breathing is faster than usual.  · You have difficulty sleeping.  · You need to use your rescue medicines or inhalers more often than expected.  · You have trouble doing routine activities such as getting dressed or walking around the house.  Get help right away if:  · You have shortness of breath while you are resting.  · You have shortness of breath that prevents you from:  ? Being able to talk.  ? Performing your usual physical activities.  · You have chest pain lasting longer than 5 minutes.  · Your skin color is more blue (cyanotic) than usual.  · You measure low oxygen saturations for longer than 5 minutes with a pulse oximeter.  · You have a fever.  · You feel too tired to breathe normally.  Summary  · Chronic obstructive pulmonary disease (COPD) is a long-term (chronic) condition that affects the lungs.  · Your lung function will probably never return to normal. In most cases, it gets worse over time. However, there are steps you can take to slow the progression of the disease and improve your quality of life.  · Treatment for COPD may include taking medicines, quitting smoking, pulmonary rehabilitation, and changes to diet and exercise. As the disease progresses, you may need oxygen therapy, a lung  transplant, or palliative care.  · To help manage your condition, do not smoke, avoid exposure to things that irritate your lungs, stay up to date on all vaccines, and follow your health care provider's instructions for taking medicines.  This information is not intended to replace advice given to you by your health care provider. Make sure you discuss any questions you have with your health care provider.  Document Released: 09/27/2006 Document Revised: 06/13/2018 Document Reviewed: 01/22/2018  [employer] Patient Education © 2019 [employer]

## 2020-01-16 DIAGNOSIS — I10 ESSENTIAL HYPERTENSION: ICD-10-CM

## 2020-01-16 RX ORDER — FUROSEMIDE 40 MG/1
TABLET ORAL
Qty: 90 TABLET | Refills: 1 | Status: SHIPPED | OUTPATIENT
Start: 2020-01-16 | End: 2020-07-13

## 2020-01-17 ENCOUNTER — OFFICE VISIT (OUTPATIENT)
Dept: FAMILY MEDICINE CLINIC | Facility: CLINIC | Age: 70
End: 2020-01-17

## 2020-01-17 VITALS
HEART RATE: 72 BPM | OXYGEN SATURATION: 97 % | BODY MASS INDEX: 33.38 KG/M2 | WEIGHT: 170 LBS | SYSTOLIC BLOOD PRESSURE: 124 MMHG | DIASTOLIC BLOOD PRESSURE: 72 MMHG | HEIGHT: 60 IN | TEMPERATURE: 97.3 F

## 2020-01-17 DIAGNOSIS — F51.01 PRIMARY INSOMNIA: ICD-10-CM

## 2020-01-17 DIAGNOSIS — R05.9 COUGH: Primary | ICD-10-CM

## 2020-01-17 PROCEDURE — 99213 OFFICE O/P EST LOW 20 MIN: CPT | Performed by: FAMILY MEDICINE

## 2020-01-17 RX ORDER — TRAZODONE HYDROCHLORIDE 100 MG/1
100 TABLET ORAL NIGHTLY
Qty: 30 TABLET | Refills: 5 | Status: SHIPPED | OUTPATIENT
Start: 2020-01-17 | End: 2020-02-20 | Stop reason: SDUPTHER

## 2020-01-17 NOTE — PROGRESS NOTES
OFFICE VISIT NOTE:    Shanell Coffman is a 69 y.o. female who presents today for Cough and Insomnia.     Having trouble sleeping and felt like it was related to anniversary of daughter's death coming up. Goes to sleep around 12 and awakens around 2 and cannot go to sleep again.     Cough   This is a chronic problem. The current episode started more than 1 year ago. The problem has been waxing and waning. The problem occurs every few hours. The cough is productive of sputum. Pertinent negatives include no chest pain, fever, hemoptysis, rash, shortness of breath or weight loss. The symptoms are aggravated by cold air, stress and exercise. She has tried a beta-agonist inhaler, body position changes, cool air, OTC cough suppressant and rest for the symptoms. The treatment provided moderate relief.   Insomnia   This is a chronic problem. The current episode started more than 1 year ago. The problem occurs daily. The problem has been unchanged. Associated symptoms include coughing. Pertinent negatives include no abdominal pain, chest pain, fatigue, fever or rash. The symptoms are aggravated by stress. She has tried rest for the symptoms. The treatment provided moderate relief.        Past medical/surgical history, Family history, Social history, Allergies and Medications have been reviewed with the patient today and are updated in [facility] EMR. See below.    Past Medical History:   Diagnosis Date   • Anxiety    • Chronic pain    • COPD (chronic obstructive pulmonary disease) (CMS/HCC)    • GERD (gastroesophageal reflux disease)    • Hyperlipidemia      Past Surgical History:   Procedure Laterality Date   • APPENDECTOMY     • CHOLECYSTECTOMY     • COLON SURGERY     • TUBAL ABDOMINAL LIGATION       Family History   Problem Relation Age of Onset   • Heart disease Mother    • Hyperlipidemia Mother    • Hypertension Mother    • Cancer Father    • Mental illness Father    • Diabetes Maternal Grandmother    • Heart attack Maternal  Grandfather    • Obesity Paternal Grandmother    • Stroke Paternal Grandfather      Social History     Tobacco Use   • Smoking status: Never Smoker   • Smokeless tobacco: Never Used   Substance Use Topics   • Alcohol use: No     Frequency: Never     Binge frequency: Never   • Drug use: No       ALLERGIES:  Ceclor [cefaclor]; Keflex [cephalexin]; Stadol [butorphanol]; Sulfa antibiotics; and Seroquel [quetiapine]    CURRENT MEDS:    Current Outpatient Medications:   •  albuterol (PROVENTIL) (2.5 MG/3ML) 0.083% nebulizer solution, USE 3 ML PER NEBULIZER 4 TIMES A DAY, Disp: 375 mL, Rfl: 0  •  ALPRAZolam (XANAX) 1 MG tablet, Take 1 tablet by mouth 3 (Three) Times a Day As Needed for Anxiety., Disp: 90 tablet, Rfl: 2  •  amLODIPine (NORVASC) 10 MG tablet, Take 1 tablet by mouth Daily., Disp: 90 tablet, Rfl: 1  •  baclofen (LIORESAL) 10 MG tablet, TAKE 1 TABLET BY MOUTH EVERY DAY, Disp: 30 tablet, Rfl: 2  •  Calcium Carbonate-Vitamin D (OYSTER SHELL CALCIUM 500 + D) 500-125 MG-UNIT tablet, TAKE 1 TABLET BY MOUTH WITH FOOD, Disp: 90 each, Rfl: 2  •  cloNIDine (CATAPRES) 0.2 MG tablet, TAKE 1 TABLET BY MOUTH 2 (TWO) TIMES A DAY. BEGIN EVENING OF 4/22/19, AND DC PATCHES, Disp: 180 tablet, Rfl: 1  •  dicyclomine (BENTYL) 20 MG tablet, TAKE 1 TABLET BY MOUTH 4 TIMES A DAY PRB, Disp: , Rfl: 2  •  dilTIAZem (TIAZAC) 360 MG 24 hr capsule, TAKE 1 CAPSULE BY MOUTH EVERY DAY, Disp: 90 capsule, Rfl: 1  •  doxepin (SINEquan) 50 MG capsule, TAKE 1 CAPSULE BY MOUTH EVERY DAY AT NIGHT, Disp: 30 capsule, Rfl: 5  •  EPINEPHrine (EPIPEN) 0.3 MG/0.3ML solution auto-injector injection, INJECT 0.3 ML INTO THE APPROPRIATE MUSCLE AS DIRECTED BY PRESCRIBER 1 (ONE) TIME FOR 1 DOSE., Disp: , Rfl: 1  •  escitalopram (LEXAPRO) 20 MG tablet, Take 1 tablet by mouth Every Night., Disp: 30 tablet, Rfl: 5  •  fexofenadine-pseudoephedrine (ALLEGRA-D)  MG per 12 hr tablet, Take 1 tablet by mouth 2 (Two) Times a Day As Needed for Allergies (sinus  congestion)., Disp: 30 tablet, Rfl: 2  •  furosemide (LASIX) 40 MG tablet, TAKE 1 TABLET BY MOUTH EVERY DAY, Disp: 90 tablet, Rfl: 1  •  ipratropium-albuterol (DUO-NEB) 0.5-2.5 mg/3 ml nebulizer, USE 1 VIAL PER NEBULIZER FOUR TIMES DAILY, Disp: , Rfl:   •  ketoconazole (NIZORAL) 2 % cream, Apply  topically to the appropriate area as directed Daily., Disp: 60 g, Rfl: 5  •  linaclotide (LINZESS) 72 MCG capsule capsule, Take 1 capsule by mouth Every Morning Before Breakfast., Disp: 30 capsule, Rfl: 5  •  losartan (COZAAR) 50 MG tablet, Take 100 mg by mouth Daily., Disp: , Rfl:   •  metoclopramide (REGLAN) 5 MG tablet, TAKE 1 TABLET BY MOUTH 4 TIMES A DAY AS NEEDED, Disp: 120 tablet, Rfl: 5  •  metoprolol succinate XL (TOPROL-XL) 25 MG 24 hr tablet, Take 1 tablet by mouth Daily., Disp: 30 tablet, Rfl: 5  •  mirtazapine (REMERON) 15 MG tablet, Take 15 mg by mouth every night at bedtime., Disp: , Rfl: 3  •  nabumetone (RELAFEN) 500 MG tablet, Take 1 tablet by mouth 2 (Two) Times a Day As Needed for Moderate Pain ., Disp: 60 tablet, Rfl: 5  •  nystatin (MYCOSTATIN) 946454 UNIT/GM powder, Apply  topically to the appropriate area as directed 3 (Three) Times a Day., Disp: 60 g, Rfl: 3  •  O2 (OXYGEN), Inhale 2 L/min 1 (One) Time., Disp: , Rfl:   •  ondansetron ODT (ZOFRAN ODT) 4 MG disintegrating tablet, Take 1 tablet by mouth Every 8 (Eight) Hours As Needed for Nausea or Vomiting., Disp: 30 tablet, Rfl: 1  •  pantoprazole (PROTONIX) 40 MG EC tablet, Take 40 mg by mouth Daily., Disp: , Rfl:   •  potassium chloride ER (K-TAB) 20 MEQ tablet controlled-release ER tablet, Take 1 tablet by mouth Daily., Disp: 90 tablet, Rfl: 1  •  promethazine (PHENERGAN) 25 MG tablet, TAKE 1 TABLET(S) BY MOUTH DAILY AS NEEDED NAUSEA/VOMITING, Disp: , Rfl: 5  •  spironolactone (ALDACTONE) 25 MG tablet, Take 1 tablet by mouth 2 (Two) Times a Day As Needed (swelling or elevated BP)., Disp: 180 tablet, Rfl: 1  •  terbinafine (LAMISIL) 250 MG tablet,  "Take 1 tablet by mouth Daily., Disp: 14 tablet, Rfl: 2  •  tiotropium bromide monohydrate (SPIRIVA RESPIMAT) 2.5 MCG/ACT aerosol solution inhaler, Inhale 2 puffs Daily., Disp: 4 g, Rfl: 5  •  VENTOLIN  (90 Base) MCG/ACT inhaler, INHALE 2 PUFFS EVERY 4 (FOUR) HOURS AS NEEDED FOR WHEEZING OR SHORTNESS OF AIR., Disp: 18 inhaler, Rfl: 5  •  HYDROcod Polst-CPM Polst ER (TUSSIONEX PENNKINETIC ER) 10-8 MG/5ML ER suspension, Take 5 mL by mouth Every 12 (Twelve) Hours As Needed for Cough., Disp: 240 mL, Rfl: 0  •  traZODone (DESYREL) 100 MG tablet, Take 1 tablet by mouth Every Night., Disp: 30 tablet, Rfl: 5    REVIEW OF SYSTEMS:  Review of Systems   Constitutional: Negative for activity change, fatigue, fever, unexpected weight gain and unexpected weight loss.   Respiratory: Positive for cough. Negative for hemoptysis and shortness of breath.    Cardiovascular: Negative for chest pain.   Gastrointestinal: Negative for abdominal pain.   Genitourinary: Negative for difficulty urinating.   Skin: Negative for rash.   Neurological: Negative for syncope and headache.   Psychiatric/Behavioral: The patient has insomnia.        PHYSICAL EXAMINATION:  Vital Signs:  /72 (BP Location: Left arm, Patient Position: Sitting, Cuff Size: Adult)   Pulse 72   Temp 97.3 °F (36.3 °C) (Temporal)   Ht 152.4 cm (60\")   Wt 77.1 kg (170 lb)   LMP  (LMP Unknown)   SpO2 97%   Breastfeeding No   BMI 33.20 kg/m²   Vitals:    01/17/20 0853   Patient Position: Sitting       Physical Exam   Constitutional: She is oriented to person, place, and time. She appears well-developed and well-nourished. No distress.   HENT:   Head: Normocephalic and atraumatic.   Mouth/Throat: Oropharynx is clear and moist.   Neck: Normal range of motion. Neck supple. No JVD present.   Cardiovascular: Normal rate, regular rhythm, normal heart sounds and intact distal pulses.   Pulmonary/Chest: Effort normal. No respiratory distress. She has rales (rare fine " scattered).   Musculoskeletal: Normal range of motion. She exhibits no edema.   Neurological: She is alert and oriented to person, place, and time. No cranial nerve deficit.   Skin: Skin is warm and dry. Capillary refill takes less than 2 seconds. No rash noted.   Psychiatric: She has a normal mood and affect. Her behavior is normal.   Nursing note and vitals reviewed.      Procedures    ASSESSMENT/ PLAN:  Problem List Items Addressed This Visit     None      Visit Diagnoses     Cough    -  Primary    Relevant Medications    HYDROcod Polst-CPM Polst ER (TUSSIONEX PENNKINETIC ER) 10-8 MG/5ML ER suspension    Primary insomnia        Relevant Medications    traZODone (DESYREL) 100 MG tablet            Specific Patient Instructions:  MEDICATION Instructions: Encouraged patient to continue routine medicines as prescribed and maintain compliance. Patient instructed to report any adverse side effects or reactions to medicines promptly to the office. Patient instructed to make us aware of any OTC or herbal meds or supplement use.    DIET Recommendations: Patient instructed and provided information on the following nutrition and diet recommendations: Calorie restriction for weight reduction and maintenance. Necessity for adequate daily intake of fluids/water. Also, diet information provided in AVS for specifics.    EXERCISE Instructions: Discussed with patient the need for routine aerobic activity for cardiovascular fitness, 3 times a week for about 30 minutes. Daily exercise for increased fitness and weight reduction goals.    SMOKING Recommendations: Counseled patient and encouraged them on smoking and tobacco cessation if applicable. Discussed the benefits to all body systems with smoking/tobacco cessation, including decreased cardiac/lung/stroke/cancer risk. Encouraged no vaping use.    HEALTH MAINTENANCE:  Counseling provided to patient/family about routine health maintenance and ANNUAL physicals/labs. Counseling on  recommended Vaccinations appropriate for age needed.        Patient's Body mass index is 33.2 kg/m². BMI is above normal parameters. Recommendations include: exercise counseling and nutrition counseling.      Medications or Orders placed this visit:  No orders of the defined types were placed in this encounter.      Medications DISCONTINUED this visit:  There are no discontinued medications.    FOLLOW-UP:  Return in about 3 months (around 4/17/2020) for Recheck.    I discussed the patients findings and my recommendations with patient.  An After Visit Summary (AVS) was printed and given to the patient at discharge.      Reji Jacobs MD, FAAFP  1/17/2020

## 2020-01-17 NOTE — PATIENT INSTRUCTIONS
Cough, Adult    Coughing is a reflex that clears your throat and your airways. Coughing helps to heal and protect your lungs. It is normal to cough occasionally, but a cough that happens with other symptoms or lasts a long time may be a sign of a condition that needs treatment. A cough may last only 2-3 weeks (acute), or it may last longer than 8 weeks (chronic).  What are the causes?  Coughing is commonly caused by:  · Breathing in substances that irritate your lungs.  · A viral or bacterial respiratory infection.  · Allergies.  · Asthma.  · Postnasal drip.  · Smoking.  · Acid backing up from the stomach into the esophagus (gastroesophageal reflux).  · Certain medicines.  · Chronic lung problems, including COPD (or rarely, lung cancer).  · Other medical conditions such as heart failure.  Follow these instructions at home:  Pay attention to any changes in your symptoms. Take these actions to help with your discomfort:  · Take medicines only as told by your health care provider.  ? If you were prescribed an antibiotic medicine, take it as told by your health care provider. Do not stop taking the antibiotic even if you start to feel better.  ? Talk with your health care provider before you take a cough suppressant medicine.  · Drink enough fluid to keep your urine clear or pale yellow.  · If the air is dry, use a cold steam vaporizer or humidifier in your bedroom or your home to help loosen secretions.  · Avoid anything that causes you to cough at work or at home.  · If your cough is worse at night, try sleeping in a semi-upright position.  · Avoid cigarette smoke. If you smoke, quit smoking. If you need help quitting, ask your health care provider.  · Avoid caffeine.  · Avoid alcohol.  · Rest as needed.  Contact a health care provider if:  · You have new symptoms.  · You cough up pus.  · Your cough does not get better after 2-3 weeks, or your cough gets worse.  · You cannot control your cough with suppressant  medicines and you are losing sleep.  · You develop pain that is getting worse or pain that is not controlled with pain medicines.  · You have a fever.  · You have unexplained weight loss.  · You have night sweats.  Get help right away if:  · You cough up blood.  · You have difficulty breathing.  · Your heartbeat is very fast.  This information is not intended to replace advice given to you by your health care provider. Make sure you discuss any questions you have with your health care provider.  Document Released: 06/15/2012 Document Revised: 05/25/2017 Document Reviewed: 02/24/2016  [employer] Interactive Patient Education © 2019 [employer]      Insomnia  Insomnia is a sleep disorder that makes it difficult to fall asleep or stay asleep. Insomnia can cause fatigue, low energy, difficulty concentrating, mood swings, and poor performance at work or school.  There are three different ways to classify insomnia:  · Difficulty falling asleep.  · Difficulty staying asleep.  · Waking up too early in the morning.  Any type of insomnia can be long-term (chronic) or short-term (acute). Both are common. Short-term insomnia usually lasts for three months or less. Chronic insomnia occurs at least three times a week for longer than three months.  What are the causes?  Insomnia may be caused by another condition, situation, or substance, such as:  · Anxiety.  · Certain medicines.  · Gastroesophageal reflux disease (GERD) or other gastrointestinal conditions.  · Asthma or other breathing conditions.  · Restless legs syndrome, sleep apnea, or other sleep disorders.  · Chronic pain.  · Menopause.  · Stroke.  · Abuse of alcohol, tobacco, or illegal drugs.  · Mental health conditions, such as depression.  · Caffeine.  · Neurological disorders, such as Alzheimer's disease.  · An overactive thyroid (hyperthyroidism).  Sometimes, the cause of insomnia may not be known.  What increases the risk?  Risk factors for insomnia  include:  · Gender. Women are affected more often than men.  · Age. Insomnia is more common as you get older.  · Stress.  · Lack of exercise.  · Irregular work schedule or working night shifts.  · Traveling between different time zones.  · Certain medical and mental health conditions.  What are the signs or symptoms?  If you have insomnia, the main symptom is having trouble falling asleep or having trouble staying asleep. This may lead to other symptoms, such as:  · Feeling fatigued or having low energy.  · Feeling nervous about going to sleep.  · Not feeling rested in the morning.  · Having trouble concentrating.  · Feeling irritable, anxious, or depressed.  How is this diagnosed?  This condition may be diagnosed based on:  · Your symptoms and medical history. Your health care provider may ask about:  ? Your sleep habits.  ? Any medical conditions you have.  ? Your mental health.  · A physical exam.  How is this treated?  Treatment for insomnia depends on the cause. Treatment may focus on treating an underlying condition that is causing insomnia. Treatment may also include:  · Medicines to help you sleep.  · Counseling or therapy.  · Lifestyle adjustments to help you sleep better.  Follow these instructions at home:  Eating and drinking    · Limit or avoid alcohol, caffeinated beverages, and cigarettes, especially close to bedtime. These can disrupt your sleep.  · Do not eat a large meal or eat spicy foods right before bedtime. This can lead to digestive discomfort that can make it hard for you to sleep.  Sleep habits    · Keep a sleep diary to help you and your health care provider figure out what could be causing your insomnia. Write down:  ? When you sleep.  ? When you wake up during the night.  ? How well you sleep.  ? How rested you feel the next day.  ? Any side effects of medicines you are taking.  ? What you eat and drink.  · Make your bedroom a dark, comfortable place where it is easy to fall  asleep.  ? Put up shades or blackout curtains to block light from outside.  ? Use a white noise machine to block noise.  ? Keep the temperature cool.  · Limit screen use before bedtime. This includes:  ? Watching TV.  ? Using your smartphone, tablet, or computer.  · Stick to a routine that includes going to bed and waking up at the same times every day and night. This can help you fall asleep faster. Consider making a quiet activity, such as reading, part of your nighttime routine.  · Try to avoid taking naps during the day so that you sleep better at night.  · Get out of bed if you are still awake after 15 minutes of trying to sleep. Keep the lights down, but try reading or doing a quiet activity. When you feel sleepy, go back to bed.  General instructions  · Take over-the-counter and prescription medicines only as told by your health care provider.  · Exercise regularly, as told by your health care provider. Avoid exercise starting several hours before bedtime.  · Use relaxation techniques to manage stress. Ask your health care provider to suggest some techniques that may work well for you. These may include:  ? Breathing exercises.  ? Routines to release muscle tension.  ? Visualizing peaceful scenes.  · Make sure that you drive carefully. Avoid driving if you feel very sleepy.  · Keep all follow-up visits as told by your health care provider. This is important.  Contact a health care provider if:  · You are tired throughout the day.  · You have trouble in your daily routine due to sleepiness.  · You continue to have sleep problems, or your sleep problems get worse.  Get help right away if:  · You have serious thoughts about hurting yourself or someone else.  If you ever feel like you may hurt yourself or others, or have thoughts about taking your own life, get help right away. You can go to your nearest emergency department or call:  · Your local emergency services (911 in the [place]  · A suicide crisis  helpline, such as the National Suicide Prevention Lifeline at 1-680.762.4939. This is open 24 hours a day.  Summary  · Insomnia is a sleep disorder that makes it difficult to fall asleep or stay asleep.  · Insomnia can be long-term (chronic) or short-term (acute).  · Treatment for insomnia insomnia.  This information is not intended to replace advice given to you by your health care provider. Make sure you discuss any questions you have with your health care provider.  Document Released: 12/15/2001 Document Revised: 09/27/2018 Document Reviewed: 09/27/2018  [employer] Interactive Patient Education © 2019 [employer]

## 2020-01-24 ENCOUNTER — TELEPHONE (OUTPATIENT)
Dept: FAMILY MEDICINE CLINIC | Facility: CLINIC | Age: 70
End: 2020-01-24

## 2020-01-24 DIAGNOSIS — R11.2 NON-INTRACTABLE VOMITING WITH NAUSEA, UNSPECIFIED VOMITING TYPE: ICD-10-CM

## 2020-01-27 RX ORDER — ONDANSETRON 4 MG/1
4 TABLET, ORALLY DISINTEGRATING ORAL EVERY 8 HOURS PRN
Qty: 30 TABLET | Refills: 1 | Status: SHIPPED | OUTPATIENT
Start: 2020-01-27 | End: 2020-03-10

## 2020-02-06 RX ORDER — MIRTAZAPINE 15 MG/1
TABLET, FILM COATED ORAL
Qty: 90 TABLET | Refills: 3 | Status: SHIPPED | OUTPATIENT
Start: 2020-02-06 | End: 2020-08-10 | Stop reason: ALTCHOICE

## 2020-02-19 DIAGNOSIS — I10 ESSENTIAL HYPERTENSION: ICD-10-CM

## 2020-02-19 RX ORDER — SPIRONOLACTONE 25 MG/1
TABLET ORAL
Qty: 90 TABLET | Refills: 1 | Status: SHIPPED | OUTPATIENT
Start: 2020-02-19 | End: 2020-08-10 | Stop reason: ALTCHOICE

## 2020-02-20 DIAGNOSIS — F51.01 PRIMARY INSOMNIA: ICD-10-CM

## 2020-02-20 RX ORDER — TRAZODONE HYDROCHLORIDE 100 MG/1
100 TABLET ORAL NIGHTLY
Qty: 90 TABLET | Refills: 1 | Status: SHIPPED | OUTPATIENT
Start: 2020-02-20 | End: 2020-03-02 | Stop reason: SDUPTHER

## 2020-03-02 ENCOUNTER — OFFICE VISIT (OUTPATIENT)
Dept: FAMILY MEDICINE CLINIC | Facility: CLINIC | Age: 70
End: 2020-03-02

## 2020-03-02 ENCOUNTER — TELEPHONE (OUTPATIENT)
Dept: FAMILY MEDICINE CLINIC | Facility: CLINIC | Age: 70
End: 2020-03-02

## 2020-03-02 VITALS
SYSTOLIC BLOOD PRESSURE: 166 MMHG | DIASTOLIC BLOOD PRESSURE: 80 MMHG | HEART RATE: 92 BPM | OXYGEN SATURATION: 87 % | HEIGHT: 60 IN | TEMPERATURE: 98 F | BODY MASS INDEX: 34.75 KG/M2 | WEIGHT: 177 LBS

## 2020-03-02 DIAGNOSIS — B37.0 THRUSH: ICD-10-CM

## 2020-03-02 DIAGNOSIS — F51.01 PRIMARY INSOMNIA: ICD-10-CM

## 2020-03-02 DIAGNOSIS — Z79.899 LONG-TERM USE OF HIGH-RISK MEDICATION: Primary | ICD-10-CM

## 2020-03-02 DIAGNOSIS — B35.4 TINEA CORPORIS: ICD-10-CM

## 2020-03-02 DIAGNOSIS — F32.9 REACTIVE DEPRESSION: ICD-10-CM

## 2020-03-02 DIAGNOSIS — F41.9 ANXIETY: ICD-10-CM

## 2020-03-02 DIAGNOSIS — M62.830 LUMBAR PARASPINAL MUSCLE SPASM: ICD-10-CM

## 2020-03-02 PROCEDURE — 99214 OFFICE O/P EST MOD 30 MIN: CPT | Performed by: FAMILY MEDICINE

## 2020-03-02 RX ORDER — BACLOFEN 10 MG/1
10 TABLET ORAL DAILY
Qty: 30 TABLET | Refills: 2 | Status: SHIPPED | OUTPATIENT
Start: 2020-03-02 | End: 2020-06-12

## 2020-03-02 RX ORDER — ESCITALOPRAM OXALATE 20 MG/1
20 TABLET ORAL NIGHTLY
Qty: 90 TABLET | Refills: 3 | Status: SHIPPED | OUTPATIENT
Start: 2020-03-02 | End: 2020-08-10 | Stop reason: SDUPTHER

## 2020-03-02 RX ORDER — ALPRAZOLAM 1 MG/1
1 TABLET ORAL 3 TIMES DAILY PRN
Qty: 90 TABLET | Refills: 2 | Status: SHIPPED | OUTPATIENT
Start: 2020-03-02 | End: 2020-06-23 | Stop reason: SDUPTHER

## 2020-03-02 RX ORDER — TRAZODONE HYDROCHLORIDE 100 MG/1
100 TABLET ORAL NIGHTLY
Qty: 90 TABLET | Refills: 3 | Status: SHIPPED | OUTPATIENT
Start: 2020-03-02 | End: 2020-08-10 | Stop reason: SDUPTHER

## 2020-03-02 RX ORDER — TERBINAFINE HYDROCHLORIDE 250 MG/1
250 TABLET ORAL DAILY
Qty: 14 TABLET | Refills: 0 | Status: SHIPPED | OUTPATIENT
Start: 2020-03-02 | End: 2020-11-24 | Stop reason: SDUPTHER

## 2020-03-02 NOTE — PROGRESS NOTES
OFFICE VISIT NOTE:    Shanell Coffman is a 69 y.o. female who presents today for Back Pain (3 mth recheck ); med refills; and Anxiety (recheck).     Reports her oxygen at night is running around 72% in the middle of the night -  relates that she may go to bathroom and forget to put the oxygen back on after she gets back in bed. No reported apnea episodes. May have been related to being out of the neb solution a day or 2 last week. He states she is better now back on the nebs routinely.     Patient presents for follow-up of long term use of high risk medication (narcotics, sedatives, stimulants or other controlled medications). The patient has read and signed the [facility] Controlled Substance Contract - copy in chart and updated annually. A recent Jethro was reviewed and is present in the chart, updated annually and as needed. UDS has been reviewed and is up to date, and performed at least annually and PRN discretion of provider. No aberrant behavioral issues are noted, and no significant side effects/complications are present. The patient is aware of the potential for addiction and dependence of these medications and accepts responsibility for proper med use. Patient is aware of the PRN use of these medications (unless otherwise prescribed), and not to be used routinely.       Back Pain   This is a chronic problem. The current episode started more than 1 year ago. The problem occurs constantly. The problem is unchanged. The pain is present in the lumbar spine and sacro-iliac. The quality of the pain is described as cramping and aching. The pain does not radiate. The pain is severe. The pain is the same all the time. The symptoms are aggravated by bending, standing and twisting. Stiffness is present all day. Pertinent negatives include no abdominal pain, chest pain, fever or weight loss. She has tried analgesics, bed rest and home exercises for the symptoms. The treatment provided moderate relief.    Anxiety   Presents for follow-up visit. Symptoms include nervous/anxious behavior. Patient reports no chest pain, excessive worry, obsessions, palpitations, panic, restlessness, shortness of breath or suicidal ideas. Symptoms occur most days. The severity of symptoms is causing significant distress. The quality of sleep is fair. Nighttime awakenings: occasional.     Compliance with medications is %.        Past medical/surgical history, Family history, Social history, Allergies and Medications have been reviewed with the patient today and are updated in [facility] EMR. See below.    Past Medical History:   Diagnosis Date   • Anxiety    • Chronic pain    • COPD (chronic obstructive pulmonary disease) (CMS/HCC)    • GERD (gastroesophageal reflux disease)    • Hyperlipidemia      Past Surgical History:   Procedure Laterality Date   • APPENDECTOMY     • CHOLECYSTECTOMY     • COLON SURGERY     • TUBAL ABDOMINAL LIGATION       Family History   Problem Relation Age of Onset   • Heart disease Mother    • Hyperlipidemia Mother    • Hypertension Mother    • Cancer Father    • Mental illness Father    • Diabetes Maternal Grandmother    • Heart attack Maternal Grandfather    • Obesity Paternal Grandmother    • Stroke Paternal Grandfather      Social History     Tobacco Use   • Smoking status: Never Smoker   • Smokeless tobacco: Never Used   Substance Use Topics   • Alcohol use: No     Frequency: Never     Binge frequency: Never   • Drug use: No       ALLERGIES:  Ceclor [cefaclor]; Keflex [cephalexin]; Stadol [butorphanol]; Sulfa antibiotics; and Seroquel [quetiapine]    CURRENT MEDS:    Current Outpatient Medications:   •  albuterol (PROVENTIL) (2.5 MG/3ML) 0.083% nebulizer solution, USE 3 ML PER NEBULIZER 4 TIMES A DAY, Disp: 375 mL, Rfl: 0  •  ALPRAZolam (XANAX) 1 MG tablet, Take 1 tablet by mouth 3 (Three) Times a Day As Needed for Anxiety., Disp: 90 tablet, Rfl: 2  •  amLODIPine (NORVASC) 10 MG tablet, Take 1 tablet by  mouth Daily., Disp: 90 tablet, Rfl: 1  •  baclofen (LIORESAL) 10 MG tablet, Take 1 tablet by mouth Daily., Disp: 30 tablet, Rfl: 2  •  Calcium Carbonate-Vitamin D (OYSTER SHELL CALCIUM 500 + D) 500-125 MG-UNIT tablet, TAKE 1 TABLET BY MOUTH WITH FOOD, Disp: 90 each, Rfl: 2  •  cloNIDine (CATAPRES) 0.2 MG tablet, TAKE 1 TABLET BY MOUTH 2 (TWO) TIMES A DAY. BEGIN EVENING OF 4/22/19, AND DC PATCHES, Disp: 180 tablet, Rfl: 1  •  dicyclomine (BENTYL) 20 MG tablet, TAKE 1 TABLET BY MOUTH 4 TIMES A DAY PRB, Disp: , Rfl: 2  •  dilTIAZem (TIAZAC) 360 MG 24 hr capsule, TAKE 1 CAPSULE BY MOUTH EVERY DAY, Disp: 90 capsule, Rfl: 1  •  doxepin (SINEquan) 50 MG capsule, TAKE 1 CAPSULE BY MOUTH EVERY DAY AT NIGHT, Disp: 30 capsule, Rfl: 5  •  EPINEPHrine (EPIPEN) 0.3 MG/0.3ML solution auto-injector injection, INJECT 0.3 ML INTO THE APPROPRIATE MUSCLE AS DIRECTED BY PRESCRIBER 1 (ONE) TIME FOR 1 DOSE., Disp: , Rfl: 1  •  escitalopram (LEXAPRO) 20 MG tablet, Take 1 tablet by mouth Every Night., Disp: 90 tablet, Rfl: 3  •  fexofenadine-pseudoephedrine (ALLEGRA-D)  MG per 12 hr tablet, Take 1 tablet by mouth 2 (Two) Times a Day As Needed for Allergies (sinus congestion)., Disp: 30 tablet, Rfl: 2  •  furosemide (LASIX) 40 MG tablet, TAKE 1 TABLET BY MOUTH EVERY DAY, Disp: 90 tablet, Rfl: 1  •  ipratropium-albuterol (DUO-NEB) 0.5-2.5 mg/3 ml nebulizer, USE 1 VIAL PER NEBULIZER FOUR TIMES DAILY, Disp: , Rfl:   •  ketoconazole (NIZORAL) 2 % cream, Apply  topically to the appropriate area as directed Daily., Disp: 60 g, Rfl: 5  •  linaclotide (LINZESS) 72 MCG capsule capsule, Take 1 capsule by mouth Every Morning Before Breakfast., Disp: 30 capsule, Rfl: 5  •  losartan (COZAAR) 50 MG tablet, Take 100 mg by mouth Daily., Disp: , Rfl:   •  metoclopramide (REGLAN) 5 MG tablet, TAKE 1 TABLET BY MOUTH 4 TIMES A DAY AS NEEDED, Disp: 120 tablet, Rfl: 5  •  metoprolol succinate XL (TOPROL-XL) 25 MG 24 hr tablet, Take 1 tablet by mouth Daily.,  Disp: 30 tablet, Rfl: 5  •  mirtazapine (REMERON) 15 MG tablet, TAKE 1 TABLET BY MOUTH EVERY DAY AT NIGHT, Disp: 90 tablet, Rfl: 3  •  nabumetone (RELAFEN) 500 MG tablet, Take 1 tablet by mouth 2 (Two) Times a Day As Needed for Moderate Pain ., Disp: 60 tablet, Rfl: 5  •  nystatin (MYCOSTATIN) 844580 UNIT/GM powder, Apply  topically to the appropriate area as directed 3 (Three) Times a Day., Disp: 60 g, Rfl: 3  •  O2 (OXYGEN), Inhale 2 L/min 1 (One) Time., Disp: , Rfl:   •  ondansetron ODT (ZOFRAN ODT) 4 MG disintegrating tablet, Take 1 tablet by mouth Every 8 (Eight) Hours As Needed for Nausea or Vomiting., Disp: 30 tablet, Rfl: 1  •  pantoprazole (PROTONIX) 40 MG EC tablet, Take 40 mg by mouth Daily., Disp: , Rfl:   •  potassium chloride ER (K-TAB) 20 MEQ tablet controlled-release ER tablet, Take 1 tablet by mouth Daily., Disp: 90 tablet, Rfl: 1  •  promethazine (PHENERGAN) 25 MG tablet, TAKE 1 TABLET(S) BY MOUTH DAILY AS NEEDED NAUSEA/VOMITING, Disp: , Rfl: 5  •  spironolactone (ALDACTONE) 25 MG tablet, TAKE 1 TABLET BY MOUTH EVERY DAY, Disp: 90 tablet, Rfl: 1  •  terbinafine (LAMISIL) 250 MG tablet, Take 1 tablet by mouth Daily., Disp: 14 tablet, Rfl: 2  •  tiotropium bromide monohydrate (SPIRIVA RESPIMAT) 2.5 MCG/ACT aerosol solution inhaler, Inhale 2 puffs Daily., Disp: 4 g, Rfl: 5  •  traZODone (DESYREL) 100 MG tablet, Take 1 tablet by mouth Every Night., Disp: 90 tablet, Rfl: 3  •  VENTOLIN  (90 Base) MCG/ACT inhaler, INHALE 2 PUFFS EVERY 4 (FOUR) HOURS AS NEEDED FOR WHEEZING OR SHORTNESS OF AIR., Disp: 18 inhaler, Rfl: 5  •  terbinafine (lamiSIL) 250 MG tablet, Take 1 tablet by mouth Daily., Disp: 14 tablet, Rfl: 0    REVIEW OF SYSTEMS:  Review of Systems   Constitutional: Negative for activity change, fatigue, fever, unexpected weight gain and unexpected weight loss.   Respiratory: Negative for shortness of breath.    Cardiovascular: Negative for chest pain and palpitations.   Gastrointestinal:  "Negative for abdominal pain.   Genitourinary: Negative for difficulty urinating.   Musculoskeletal: Positive for back pain.   Skin: Negative for rash.   Neurological: Negative for syncope and headache.   Psychiatric/Behavioral: Negative for suicidal ideas. The patient is nervous/anxious.        PHYSICAL EXAMINATION:  Vital Signs:  /80 (BP Location: Left arm, Patient Position: Sitting, Cuff Size: Adult)   Pulse 92   Temp 98 °F (36.7 °C) (Temporal)   Ht 152.4 cm (60\")   Wt 80.3 kg (177 lb)   LMP  (LMP Unknown)   SpO2 (!) 87% Comment: on 2 LPM of O2 with exertion  Breastfeeding No   BMI 34.57 kg/m²   Vitals:    03/02/20 0830   Patient Position: Sitting       Physical Exam   Constitutional: She is oriented to person, place, and time. She appears well-developed and well-nourished. No distress.   HENT:   Head: Normocephalic and atraumatic.   Mouth/Throat: Oropharynx is clear and moist.   Neck: Normal range of motion. Neck supple. No JVD present.   Cardiovascular: Normal rate, regular rhythm, normal heart sounds and intact distal pulses.   Pulmonary/Chest: Effort normal. No respiratory distress. She has wheezes.   Musculoskeletal: Normal range of motion. She exhibits no edema.   Neurological: She is alert and oriented to person, place, and time. No cranial nerve deficit.   Skin: Skin is warm and dry. Capillary refill takes less than 2 seconds. No rash noted.   Psychiatric: She has a normal mood and affect. Her behavior is normal.   Nursing note and vitals reviewed.      Procedures    ASSESSMENT/ PLAN:  Problem List Items Addressed This Visit        Musculoskeletal and Integument    Tinea corporis    Relevant Medications    terbinafine (lamiSIL) 250 MG tablet    Lumbar paraspinal muscle spasm    Relevant Medications    baclofen (LIORESAL) 10 MG tablet       Other    Anxiety    Relevant Medications    ALPRAZolam (XANAX) 1 MG tablet    Other Relevant Orders    Compliance Drug Analysis, Ur - Urine, Clean Catch    " Reactive depression    Relevant Medications    ALPRAZolam (XANAX) 1 MG tablet    escitalopram (LEXAPRO) 20 MG tablet    traZODone (DESYREL) 100 MG tablet      Other Visit Diagnoses     Long-term use of high-risk medication    -  Primary    Relevant Orders    Compliance Drug Analysis, Ur - Urine, Clean Catch    Thrush        Relevant Medications    terbinafine (lamiSIL) 250 MG tablet    Primary insomnia        Relevant Medications    traZODone (DESYREL) 100 MG tablet            Specific Patient Instructions:  MEDICATION Instructions: Encouraged patient to continue routine medicines as prescribed and maintain compliance. Patient instructed to report any adverse side effects or reactions to medicines promptly to the office. Patient instructed to make us aware of any OTC or herbal meds or supplement use.    DIET Recommendations: Patient instructed and provided information on the following nutrition and diet recommendations: Calorie restriction for weight reduction and maintenance. Necessity for adequate daily intake of fluids/water. Also, diet information provided in AVS for specifics.    EXERCISE Instructions: Discussed with patient the need for routine aerobic activity for cardiovascular fitness, 3 times a week for about 30 minutes. Daily exercise for increased fitness and weight reduction goals.    SMOKING Recommendations: Counseled patient and encouraged them on smoking and tobacco cessation if applicable. Discussed the benefits to all body systems with smoking/tobacco cessation, including decreased cardiac/lung/stroke/cancer risk. Encouraged no vaping use.    HEALTH MAINTENANCE:  Counseling provided to patient/family about routine health maintenance and ANNUAL physicals/labs. Counseling on recommended Vaccinations appropriate for age needed.        Patient's Body mass index is 34.57 kg/m². BMI is above normal parameters. Recommendations include: exercise counseling and nutrition counseling.      Medications or  Orders placed this visit:  Orders Placed This Encounter   Procedures   • Compliance Drug Analysis, Ur - Urine, Clean Catch       Medications DISCONTINUED this visit:  Medications Discontinued During This Encounter   Medication Reason   • HYDROcod Polst-CPM Polst ER (TUSSIONEX PENNKINETIC ER) 10-8 MG/5ML ER suspension Cost of medication   • ALPRAZolam (XANAX) 1 MG tablet Reorder   • baclofen (LIORESAL) 10 MG tablet Reorder   • escitalopram (LEXAPRO) 20 MG tablet Reorder   • traZODone (DESYREL) 100 MG tablet Reorder       FOLLOW-UP:  Return in about 3 months (around 6/2/2020) for Recheck.    I discussed the patients findings and my recommendations with patient.  An After Visit Summary (AVS) was printed and given to the patient at discharge.      Reji Jacobs MD, FAAFP  3/3/2020

## 2020-03-02 NOTE — PATIENT INSTRUCTIONS
Chronic Obstructive Pulmonary Disease    Chronic obstructive pulmonary disease (COPD) is a long-term (chronic) condition that affects the lungs. COPD is a general term that can be used to describe many different lung problems that cause lung swelling (inflammation) and limit airflow, including chronic bronchitis and emphysema. If you have COPD, your lung function will probably never return to normal. In most cases, it gets worse over time. However, there are steps you can take to slow the progression of the disease and improve your quality of life.  What are the causes?  This condition may be caused by:  · Smoking. This is the most common cause.  · Certain genes passed down through families.  What increases the risk?  The following factors may make you more likely to develop this condition:  · Secondhand smoke from cigarettes, pipes, or cigars.  · Exposure to chemicals and other irritants such as fumes and dust in the work environment.  · Chronic lung conditions or infections.  What are the signs or symptoms?  Symptoms of this condition include:  · Shortness of breath, especially during physical activity.  · Chronic cough with a large amount of thick mucus. Sometimes the cough may not have any mucus (dry cough).  · Wheezing.  · Rapid breaths.  · Gray or bluish discoloration (cyanosis) of the skin, especially in your fingers, toes, or lips.  · Feeling tired (fatigue).  · Weight loss.  · Chest tightness.  · Frequent infections.  · Episodes when breathing symptoms become much worse (exacerbations).  · Swelling in the ankles, feet, or legs. This may occur in later stages of the disease.  How is this diagnosed?  This condition is diagnosed based on:  · Your medical history.  · A physical exam.  You may also have tests, including:  · Lung (pulmonary) function tests. This may include a spirometry test, which measures your ability to exhale properly.  · Chest X-ray.  · CT scan.  · Blood tests.  How is this treated?  This  condition may be treated with:  · Medicines. These may include inhaled rescue medicines to treat acute exacerbations as well as long-term, or maintenance, medicines to prevent flare-ups of COPD.  ? Bronchodilators help treat COPD by dilating the airways to allow increased airflow and make your breathing more comfortable.  ? Steroids can reduce airway inflammation and help prevent exacerbations.  · Smoking cessation. If you smoke, your health care provider may ask you to quit, and may also recommend therapy or replacement products to help you quit.  · Pulmonary rehabilitation. This may involve working with a team of health care providers and specialists, such as respiratory, occupational, and physical therapists.  · Exercise and physical activity. These are beneficial for nearly all people with COPD.  · Nutrition therapy to gain weight, if you are underweight.  · Oxygen. Supplemental oxygen therapy is only helpful if you have a low oxygen level in your blood (hypoxemia).  · Lung surgery or transplant.  · Palliative care. This is to help people with COPD feel comfortable when treatment is no longer working.  Follow these instructions at home:  Medicines  · Take over-the-counter and prescription medicines (inhaled or pills) only as told by your health care provider.  · Talk to your health care provider before taking any cough or allergy medicines. You may need to avoid certain medicines that dry out your airways.  Lifestyle  · If you are a smoker, the most important thing that you can do is to stop smoking. Do not use any products that contain nicotine or tobacco, such as cigarettes and e-cigarettes. If you need help quitting, ask your health care provider. Continuing to smoke will cause the disease to progress faster.  · Avoid exposure to things that irritate your lungs, such as smoke, chemicals, and fumes.  · Stay active, but balance activity with periods of rest. Exercise and physical activity will help you maintain  your ability to do things you want to do.  · Learn and use relaxation techniques to manage stress and to control your breathing.  · Get the right amount of sleep and get quality sleep. Most adults need 7 or more hours per night.  · Eat healthy foods. Eating smaller, more frequent meals and resting before meals may help you maintain your strength.  Controlled breathing  Learn and use controlled breathing techniques as directed by your health care provider. Controlled breathing techniques include:  · Pursed lip breathing. Start by breathing in (inhaling) through your nose for 1 second. Then, purse your lips as if you were going to whistle and breathe out (exhale) through the pursed lips for 2 seconds.  · Diaphragmatic breathing. Start by putting one hand on your abdomen just above your waist. Inhale slowly through your nose. The hand on your abdomen should move out. Then purse your lips and exhale slowly. You should be able to feel the hand on your abdomen moving in as you exhale.  Controlled coughing  Learn and use controlled coughing to clear mucus from your lungs. Controlled coughing is a series of short, progressive coughs. The steps of controlled coughing are:  1. Lean your head slightly forward.  2. Breathe in deeply using diaphragmatic breathing.  3. Try to hold your breath for 3 seconds.  4. Keep your mouth slightly open while coughing twice.  5. Spit any mucus out into a tissue.  6. Rest and repeat the steps once or twice as needed.  General instructions  · Make sure you receive all the vaccines that your health care provider recommends, especially the pneumococcal and influenza vaccines. Preventing infection and hospitalization is very important when you have COPD.  · Use oxygen therapy and pulmonary rehabilitation if directed to by your health care provider. If you require home oxygen therapy, ask your health care provider whether you should purchase a pulse oximeter to measure your oxygen level at  home.  · Work with your health care provider to develop a COPD action plan. This will help you know what steps to take if your condition gets worse.  · Keep other chronic health conditions under control as told by your health care provider.  · Avoid extreme temperature and humidity changes.  · Avoid contact with people who have an illness that spreads from person to person (is contagious), such as viral infections or pneumonia.  · Keep all follow-up visits as told by your health care provider. This is important.  Contact a health care provider if:  · You are coughing up more mucus than usual.  · There is a change in the color or thickness of your mucus.  · Your breathing is more labored than usual.  · Your breathing is faster than usual.  · You have difficulty sleeping.  · You need to use your rescue medicines or inhalers more often than expected.  · You have trouble doing routine activities such as getting dressed or walking around the house.  Get help right away if:  · You have shortness of breath while you are resting.  · You have shortness of breath that prevents you from:  ? Being able to talk.  ? Performing your usual physical activities.  · You have chest pain lasting longer than 5 minutes.  · Your skin color is more blue (cyanotic) than usual.  · You measure low oxygen saturations for longer than 5 minutes with a pulse oximeter.  · You have a fever.  · You feel too tired to breathe normally.  Summary  · Chronic obstructive pulmonary disease (COPD) is a long-term (chronic) condition that affects the lungs.  · Your lung function will probably never return to normal. In most cases, it gets worse over time. However, there are steps you can take to slow the progression of the disease and improve your quality of life.  · Treatment for COPD may include taking medicines, quitting smoking, pulmonary rehabilitation, and changes to diet and exercise. As the disease progresses, you may need oxygen therapy, a lung  transplant, or palliative care.  · To help manage your condition, do not smoke, avoid exposure to things that irritate your lungs, stay up to date on all vaccines, and follow your health care provider's instructions for taking medicines.  This information is not intended to replace advice given to you by your health care provider. Make sure you discuss any questions you have with your health care provider.  Document Released: 09/27/2006 Document Revised: 06/13/2018 Document Reviewed: 01/22/2018  [employer] Interactive Patient Education © 2020 [employer]      Generalized Anxiety Disorder, Adult  Generalized anxiety disorder (SHENG) is a mental health disorder. People with this condition constantly worry about everyday events. Unlike normal anxiety, worry related to SHENG is not triggered by a specific event. These worries also do not fade or get better with time. SHENG interferes with life functions, including relationships, work, and school.  SHENG can vary from mild to severe. People with severe SHENG can have intense waves of anxiety with physical symptoms (panic attacks).  What are the causes?  The exact cause of SHENG is not known.  What increases the risk?  This condition is more likely to develop in:  · Women.  · People who have a family history of anxiety disorders.  · People who are very shy.  · People who experience very stressful life events, such as the death of a loved one.  · People who have a very stressful family environment.  What are the signs or symptoms?  People with SHENG often worry excessively about many things in their lives, such as their health and family. They may also be overly concerned about:  · Doing well at work.  · Being on time.  · Natural disasters.  · Friendships.  Physical symptoms of SHENG include:  · Fatigue.  · Muscle tension or having muscle twitches.  · Trembling or feeling shaky.  · Being easily startled.  · Feeling like your heart is pounding or racing.  · Feeling out of breath or like  you cannot take a deep breath.  · Having trouble falling asleep or staying asleep.  · Sweating.  · Nausea, diarrhea, or irritable bowel syndrome (IBS).  · Headaches.  · Trouble concentrating or remembering facts.  · Restlessness.  · Irritability.  How is this diagnosed?  Your health care provider can diagnose SHENG based on your symptoms and medical history. You will also have a physical exam. The health care provider will ask specific questions about your symptoms, including how severe they are, when they started, and if they come and go. Your health care provider may ask you about your use of alcohol or drugs, including prescription medicines. Your health care provider may refer you to a mental health specialist for further evaluation.  Your health care provider will do a thorough examination and may perform additional tests to rule out other possible causes of your symptoms.  To be diagnosed with SHENG, a person must have anxiety that:  · Is out of his or her control.  · Affects several different aspects of his or her life, such as work and relationships.  · Causes distress that makes him or her unable to take part in normal activities.  · Includes at least three physical symptoms of SHENG, such as restlessness, fatigue, trouble concentrating, irritability, muscle tension, or sleep problems.  Before your health care provider can confirm a diagnosis of SHENG, these symptoms must be present more days than they are not, and they must last for six months or longer.  How is this treated?  The following therapies are usually used to treat SHENG:  · Medicine. Antidepressant medicine is usually prescribed for long-term daily control. Antianxiety medicines may be added in severe cases, especially when panic attacks occur.  · Talk therapy (psychotherapy). Certain types of talk therapy can be helpful in treating SHENG by providing support, education, and guidance. Options include:  ? Cognitive behavioral therapy (CBT). People learn  coping skills and techniques to ease their anxiety. They learn to identify unrealistic or negative thoughts and behaviors and to replace them with positive ones.  ? Acceptance and commitment therapy (ACT). This treatment teaches people how to be mindful as a way to cope with unwanted thoughts and feelings.  ? Biofeedback. This process trains you to manage your body's response (physiological response) through breathing techniques and relaxation methods. You will work with a therapist while machines are used to monitor your physical symptoms.  · Stress management techniques. These include yoga, meditation, and exercise.  A mental health specialist can help determine which treatment is best for you. Some people see improvement with one type of therapy. However, other people require a combination of therapies.  Follow these instructions at home:  · Take over-the-counter and prescription medicines only as told by your health care provider.  · Try to maintain a normal routine.  · Try to anticipate stressful situations and allow extra time to manage them.  · Practice any stress management or self-calming techniques as taught by your health care provider.  · Do not punish yourself for setbacks or for not making progress.  · Try to recognize your accomplishments, even if they are small.  · Keep all follow-up visits as told by your health care provider. This is important.  Contact a health care provider if:  · Your symptoms do not get better.  · Your symptoms get worse.  · You have signs of depression, such as:  ? A persistently sad, cranky, or irritable mood.  ? Loss of enjoyment in activities that used to bring you bella.  ? Change in weight or eating.  ? Changes in sleeping habits.  ? Avoiding friends or family members.  ? Loss of energy for normal tasks.  ? Feelings of guilt or worthlessness.  Get help right away if:  · You have serious thoughts about hurting yourself or others.  If you ever feel like you may hurt yourself  or others, or have thoughts about taking your own life, get help right away. You can go to your nearest emergency department or call:  · Your local emergency services (911 in the [place]  · A suicide crisis helpline, such as the National Suicide Prevention Lifeline at 1-407.963.4801. This is open 24 hours a day.  Summary  · Generalized anxiety disorder (SHENG) is a mental health disorder that involves worry that is not triggered by a specific event.  · People with SHENG often worry excessively about many things in their lives, such as their health and family.  · SHENG may cause physical symptoms such as restlessness, trouble concentrating, sleep problems, frequent sweating, nausea, diarrhea, headaches, and trembling or muscle twitching.  · A mental health specialist can help determine which treatment is best for you. Some people see improvement with one type of therapy. However, other people require a combination of therapies.  This information is not intended to replace advice given to you by your health care provider. Make sure you discuss any questions you have with your health care provider.  Document Released: 04/14/2014 Document Revised: 01/07/2020 Document Reviewed: 11/07/2017  [employer] Interactive Patient Education © 2020 [employer]

## 2020-03-04 ENCOUNTER — TELEPHONE (OUTPATIENT)
Dept: FAMILY MEDICINE CLINIC | Facility: CLINIC | Age: 70
End: 2020-03-04

## 2020-03-04 NOTE — TELEPHONE ENCOUNTER
Heydi called. Stated that she did receive form for oxygen, and it is correct. However the physician's note does not identify reading at: room air at rest, room air walking. She said the note does include walking with oxygen. She stated the machine will not be covered unless the two needed readings are added.

## 2020-03-05 ENCOUNTER — CLINICAL SUPPORT (OUTPATIENT)
Dept: FAMILY MEDICINE CLINIC | Facility: CLINIC | Age: 70
End: 2020-03-05

## 2020-03-06 LAB — DRUGS UR: NORMAL

## 2020-03-07 DIAGNOSIS — R11.2 NON-INTRACTABLE VOMITING WITH NAUSEA, UNSPECIFIED VOMITING TYPE: ICD-10-CM

## 2020-03-10 RX ORDER — ONDANSETRON 4 MG/1
TABLET, ORALLY DISINTEGRATING ORAL
Qty: 30 TABLET | Refills: 1 | Status: SHIPPED | OUTPATIENT
Start: 2020-03-10 | End: 2020-04-17 | Stop reason: SDUPTHER

## 2020-03-19 DIAGNOSIS — I10 ESSENTIAL HYPERTENSION: ICD-10-CM

## 2020-03-19 RX ORDER — POTASSIUM CHLORIDE 1500 MG/1
TABLET, FILM COATED, EXTENDED RELEASE ORAL
Qty: 90 TABLET | Refills: 1 | Status: SHIPPED | OUTPATIENT
Start: 2020-03-19 | End: 2020-08-10 | Stop reason: SDUPTHER

## 2020-03-31 DIAGNOSIS — F51.01 PRIMARY INSOMNIA: ICD-10-CM

## 2020-03-31 RX ORDER — DOXEPIN HYDROCHLORIDE 50 MG/1
CAPSULE ORAL
Qty: 30 CAPSULE | Refills: 5 | Status: SHIPPED | OUTPATIENT
Start: 2020-03-31 | End: 2020-08-10 | Stop reason: ALTCHOICE

## 2020-04-17 DIAGNOSIS — R11.2 NON-INTRACTABLE VOMITING WITH NAUSEA, UNSPECIFIED VOMITING TYPE: ICD-10-CM

## 2020-04-17 RX ORDER — ONDANSETRON 4 MG/1
4 TABLET, ORALLY DISINTEGRATING ORAL EVERY 8 HOURS PRN
Qty: 30 TABLET | Refills: 0 | Status: SHIPPED | OUTPATIENT
Start: 2020-04-17 | End: 2020-04-29 | Stop reason: SDUPTHER

## 2020-04-17 NOTE — TELEPHONE ENCOUNTER
Patient called in to request RX refill of ondansetron ODT (ZOFRAN-ODT) 4 MG disintegrating tablet    Pharmacy confirmed     Please Advise

## 2020-04-18 DIAGNOSIS — I10 ESSENTIAL HYPERTENSION: ICD-10-CM

## 2020-04-20 DIAGNOSIS — J20.9 ACUTE BRONCHITIS, UNSPECIFIED ORGANISM: ICD-10-CM

## 2020-04-20 DIAGNOSIS — J43.1 PANLOBULAR EMPHYSEMA (HCC): ICD-10-CM

## 2020-04-20 RX ORDER — AMLODIPINE BESYLATE 10 MG/1
TABLET ORAL
Qty: 90 TABLET | Refills: 1 | Status: SHIPPED | OUTPATIENT
Start: 2020-04-20 | End: 2020-10-27

## 2020-04-20 RX ORDER — TIOTROPIUM BROMIDE INHALATION SPRAY 3.12 UG/1
SPRAY, METERED RESPIRATORY (INHALATION)
Qty: 4 G | Refills: 5 | Status: SHIPPED | OUTPATIENT
Start: 2020-04-20

## 2020-04-29 DIAGNOSIS — R11.2 NON-INTRACTABLE VOMITING WITH NAUSEA, UNSPECIFIED VOMITING TYPE: ICD-10-CM

## 2020-04-29 RX ORDER — ONDANSETRON 4 MG/1
4 TABLET, ORALLY DISINTEGRATING ORAL EVERY 8 HOURS PRN
Qty: 30 TABLET | Refills: 0 | Status: SHIPPED | OUTPATIENT
Start: 2020-04-29 | End: 2020-12-31

## 2020-04-29 NOTE — TELEPHONE ENCOUNTER
PT CALLED REQUESTING A REFILL ON  - ondansetron ODT (ZOFRAN-ODT) 4 MG disintegrating tablet    PT WOULD ALSO LIKE TO SPEAK WITH DR. PAULA ABOUT HER O2.    PHARMACY: [facility]/pharmacy #[id number] - [address] - [phone] [facility] [phone]   [phone]    PT'S CALLBACK NUMBER: [phone]

## 2020-05-11 RX ORDER — PANTOPRAZOLE SODIUM 40 MG/1
TABLET, DELAYED RELEASE ORAL
Qty: 90 TABLET | Refills: 1 | Status: SHIPPED | OUTPATIENT
Start: 2020-05-11 | End: 2020-11-24 | Stop reason: SDUPTHER

## 2020-05-11 RX ORDER — DICYCLOMINE HYDROCHLORIDE 10 MG/1
CAPSULE ORAL
Qty: 360 CAPSULE | Refills: 1 | Status: SHIPPED | OUTPATIENT
Start: 2020-05-11 | End: 2020-10-16

## 2020-06-12 DIAGNOSIS — M62.830 LUMBAR PARASPINAL MUSCLE SPASM: ICD-10-CM

## 2020-06-12 RX ORDER — BACLOFEN 10 MG/1
TABLET ORAL
Qty: 90 TABLET | Refills: 0 | Status: SHIPPED | OUTPATIENT
Start: 2020-06-12 | End: 2020-12-23

## 2020-06-22 DIAGNOSIS — F41.9 ANXIETY: ICD-10-CM

## 2020-06-22 NOTE — TELEPHONE ENCOUNTER
Called Pt to screen for lab appt 6/22/20. Pt rescheduled appt at that time due to Covid-19 concerns. Pt is asking for refills prior to appt, as she will be out by then. Pt is due med refill ov, last seen 3/2/20.  Pt uses [facility] in [place].

## 2020-06-24 RX ORDER — ALPRAZOLAM 1 MG/1
1 TABLET ORAL 3 TIMES DAILY PRN
Qty: 90 TABLET | Refills: 2 | Status: SHIPPED | OUTPATIENT
Start: 2020-06-24 | End: 2020-08-10 | Stop reason: SDUPTHER

## 2020-06-30 ENCOUNTER — TELEPHONE (OUTPATIENT)
Dept: FAMILY MEDICINE CLINIC | Facility: CLINIC | Age: 70
End: 2020-06-30

## 2020-06-30 NOTE — TELEPHONE ENCOUNTER
Patient called and stated that she has an appointment on 7/15/2020 with Dr. Jacobs for a medication refill.    She reported experiencing a horrible headache, fever and nausea. I let her know that with these issues she would likely not be able to reschedule and be seen in the office at this time. She requested to speak with Dr. Jacobs over the telephone to have her meds refilled and get help for her current symptoms sooner than the 15th if possible.    Shanell requested to be contacted at [phone].

## 2020-06-30 NOTE — TELEPHONE ENCOUNTER
Returned call to Pt, [facility] Advised Pt that would notify Dr. Jacobs of her current issues.

## 2020-07-05 NOTE — TELEPHONE ENCOUNTER
I've attempted a call back to her several times - finally got thru and she will call if no better next week.

## 2020-07-09 DIAGNOSIS — F41.9 ANXIETY: ICD-10-CM

## 2020-07-09 RX ORDER — ALPRAZOLAM 1 MG/1
1 TABLET ORAL 3 TIMES DAILY PRN
Qty: 90 TABLET | Refills: 2 | OUTPATIENT
Start: 2020-07-09

## 2020-07-09 NOTE — TELEPHONE ENCOUNTER
Patient/Provider has cancelled the last few appts. Pt is due OV. Last seen 3/2/20. Please call to schedule

## 2020-07-09 NOTE — TELEPHONE ENCOUNTER
Caller: Shanell Coffman    Relationship: Self    Best call back number: [phone]    Medication needed:   Requested Prescriptions     Pending Prescriptions Disp Refills   • ALPRAZolam (Xanax) 1 MG tablet 90 tablet 2     Sig: Take 1 tablet by mouth 3 (Three) Times a Day As Needed for Anxiety.       When do you need the refill by: End of Day    What details did the patient provide when requesting the medication: Took last tablet last night    Does the patient have less than a 3 day supply:  [x] Yes  [] No    What is the patient's preferred pharmacy: [facility]/pharmacy #[id number] - [address] [phone] [facility] [phone]

## 2020-07-11 DIAGNOSIS — I10 ESSENTIAL HYPERTENSION: ICD-10-CM

## 2020-07-13 RX ORDER — FUROSEMIDE 40 MG/1
TABLET ORAL
Qty: 30 TABLET | Refills: 0 | Status: SHIPPED | OUTPATIENT
Start: 2020-07-13 | End: 2020-07-22 | Stop reason: SDUPTHER

## 2020-07-13 NOTE — TELEPHONE ENCOUNTER
"Pt called to reschedule appt that was for 7/15-Pt rescheduled for 8/3/20. Pt stated that she has spoken with Dr. Jacobs, and he said \"he doesn't want for her to come in office due to health concerns.\"   "

## 2020-07-22 DIAGNOSIS — I10 ESSENTIAL HYPERTENSION: ICD-10-CM

## 2020-07-22 RX ORDER — FUROSEMIDE 40 MG/1
40 TABLET ORAL DAILY
Qty: 30 TABLET | Refills: 0 | Status: SHIPPED | OUTPATIENT
Start: 2020-07-22 | End: 2020-08-10 | Stop reason: SDUPTHER

## 2020-08-10 ENCOUNTER — OFFICE VISIT (OUTPATIENT)
Dept: FAMILY MEDICINE CLINIC | Facility: CLINIC | Age: 70
End: 2020-08-10

## 2020-08-10 VITALS
HEIGHT: 60 IN | SYSTOLIC BLOOD PRESSURE: 173 MMHG | OXYGEN SATURATION: 96 % | DIASTOLIC BLOOD PRESSURE: 96 MMHG | WEIGHT: 168 LBS | BODY MASS INDEX: 32.98 KG/M2 | TEMPERATURE: 98 F | HEART RATE: 69 BPM

## 2020-08-10 DIAGNOSIS — F41.9 ANXIETY: ICD-10-CM

## 2020-08-10 DIAGNOSIS — F32.9 REACTIVE DEPRESSION: ICD-10-CM

## 2020-08-10 DIAGNOSIS — F51.01 PRIMARY INSOMNIA: ICD-10-CM

## 2020-08-10 DIAGNOSIS — B35.4 TINEA CORPORIS: ICD-10-CM

## 2020-08-10 DIAGNOSIS — I10 ESSENTIAL HYPERTENSION: ICD-10-CM

## 2020-08-10 PROCEDURE — 99214 OFFICE O/P EST MOD 30 MIN: CPT | Performed by: FAMILY MEDICINE

## 2020-08-10 RX ORDER — ESCITALOPRAM OXALATE 20 MG/1
20 TABLET ORAL NIGHTLY
Qty: 90 TABLET | Refills: 1 | Status: SHIPPED | OUTPATIENT
Start: 2020-08-10 | End: 2021-03-25

## 2020-08-10 RX ORDER — TERBINAFINE HYDROCHLORIDE 250 MG/1
250 TABLET ORAL DAILY
Qty: 14 TABLET | Refills: 2 | Status: SHIPPED | OUTPATIENT
Start: 2020-08-10 | End: 2020-10-16 | Stop reason: SDUPTHER

## 2020-08-10 RX ORDER — POTASSIUM CHLORIDE 1500 MG/1
20 TABLET, FILM COATED, EXTENDED RELEASE ORAL DAILY
Qty: 90 TABLET | Refills: 1 | Status: SHIPPED | OUTPATIENT
Start: 2020-08-10 | End: 2021-06-14 | Stop reason: SDUPTHER

## 2020-08-10 RX ORDER — METOPROLOL SUCCINATE 50 MG/1
50 TABLET, EXTENDED RELEASE ORAL DAILY
Qty: 90 TABLET | Refills: 1 | Status: SHIPPED | OUTPATIENT
Start: 2020-08-10 | End: 2021-01-22 | Stop reason: SDUPTHER

## 2020-08-10 RX ORDER — NYSTATIN 100000 [USP'U]/G
POWDER TOPICAL 3 TIMES DAILY
Qty: 60 G | Refills: 3 | Status: SHIPPED | OUTPATIENT
Start: 2020-08-10

## 2020-08-10 RX ORDER — TRAZODONE HYDROCHLORIDE 150 MG/1
150 TABLET ORAL NIGHTLY
Qty: 30 TABLET | Refills: 5 | Status: SHIPPED | OUTPATIENT
Start: 2020-08-10 | End: 2021-02-19

## 2020-08-10 RX ORDER — ALPRAZOLAM 1 MG/1
1 TABLET ORAL 3 TIMES DAILY PRN
Qty: 90 TABLET | Refills: 2 | Status: SHIPPED | OUTPATIENT
Start: 2020-08-10 | End: 2020-11-24 | Stop reason: SDUPTHER

## 2020-08-10 RX ORDER — FUROSEMIDE 40 MG/1
40 TABLET ORAL DAILY
Qty: 90 TABLET | Refills: 1 | Status: SHIPPED | OUTPATIENT
Start: 2020-08-10 | End: 2021-06-14 | Stop reason: SDUPTHER

## 2020-08-10 NOTE — PATIENT INSTRUCTIONS
Generalized Anxiety Disorder, Adult  Generalized anxiety disorder (SHENG) is a mental health disorder. People with this condition constantly worry about everyday events. Unlike normal anxiety, worry related to SHENG is not triggered by a specific event. These worries also do not fade or get better with time. SHENG interferes with life functions, including relationships, work, and school.  SHENG can vary from mild to severe. People with severe SHENG can have intense waves of anxiety with physical symptoms (panic attacks).  What are the causes?  The exact cause of SHENG is not known.  What increases the risk?  This condition is more likely to develop in:  · Women.  · People who have a family history of anxiety disorders.  · People who are very shy.  · People who experience very stressful life events, such as the death of a loved one.  · People who have a very stressful family environment.  What are the signs or symptoms?  People with SHENG often worry excessively about many things in their lives, such as their health and family. They may also be overly concerned about:  · Doing well at work.  · Being on time.  · Natural disasters.  · Friendships.  Physical symptoms of SHENG include:  · Fatigue.  · Muscle tension or having muscle twitches.  · Trembling or feeling shaky.  · Being easily startled.  · Feeling like your heart is pounding or racing.  · Feeling out of breath or like you cannot take a deep breath.  · Having trouble falling asleep or staying asleep.  · Sweating.  · Nausea, diarrhea, or irritable bowel syndrome (IBS).  · Headaches.  · Trouble concentrating or remembering facts.  · Restlessness.  · Irritability.  How is this diagnosed?  Your health care provider can diagnose SHENG based on your symptoms and medical history. You will also have a physical exam. The health care provider will ask specific questions about your symptoms, including how severe they are, when they started, and if they come and go. Your health care  provider may ask you about your use of alcohol or drugs, including prescription medicines. Your health care provider may refer you to a mental health specialist for further evaluation.  Your health care provider will do a thorough examination and may perform additional tests to rule out other possible causes of your symptoms.  To be diagnosed with SHENG, a person must have anxiety that:  · Is out of his or her control.  · Affects several different aspects of his or her life, such as work and relationships.  · Causes distress that makes him or her unable to take part in normal activities.  · Includes at least three physical symptoms of SHENG, such as restlessness, fatigue, trouble concentrating, irritability, muscle tension, or sleep problems.  Before your health care provider can confirm a diagnosis of SHENG, these symptoms must be present more days than they are not, and they must last for six months or longer.  How is this treated?  The following therapies are usually used to treat SHENG:  · Medicine. Antidepressant medicine is usually prescribed for long-term daily control. Antianxiety medicines may be added in severe cases, especially when panic attacks occur.  · Talk therapy (psychotherapy). Certain types of talk therapy can be helpful in treating SHENG by providing support, education, and guidance. Options include:  ? Cognitive behavioral therapy (CBT). People learn coping skills and techniques to ease their anxiety. They learn to identify unrealistic or negative thoughts and behaviors and to replace them with positive ones.  ? Acceptance and commitment therapy (ACT). This treatment teaches people how to be mindful as a way to cope with unwanted thoughts and feelings.  ? Biofeedback. This process trains you to manage your body's response (physiological response) through breathing techniques and relaxation methods. You will work with a therapist while machines are used to monitor your physical symptoms.  · Stress  management techniques. These include yoga, meditation, and exercise.  A mental health specialist can help determine which treatment is best for you. Some people see improvement with one type of therapy. However, other people require a combination of therapies.  Follow these instructions at home:  · Take over-the-counter and prescription medicines only as told by your health care provider.  · Try to maintain a normal routine.  · Try to anticipate stressful situations and allow extra time to manage them.  · Practice any stress management or self-calming techniques as taught by your health care provider.  · Do not punish yourself for setbacks or for not making progress.  · Try to recognize your accomplishments, even if they are small.  · Keep all follow-up visits as told by your health care provider. This is important.  Contact a health care provider if:  · Your symptoms do not get better.  · Your symptoms get worse.  · You have signs of depression, such as:  ? A persistently sad, cranky, or irritable mood.  ? Loss of enjoyment in activities that used to bring you bella.  ? Change in weight or eating.  ? Changes in sleeping habits.  ? Avoiding friends or family members.  ? Loss of energy for normal tasks.  ? Feelings of guilt or worthlessness.  Get help right away if:  · You have serious thoughts about hurting yourself or others.  If you ever feel like you may hurt yourself or others, or have thoughts about taking your own life, get help right away. You can go to your nearest emergency department or call:  · Your local emergency services (911 in the [place]  · A suicide crisis helpline, such as the National Suicide Prevention Lifeline at 1-120.388.8199. This is open 24 hours a day.  Summary  · Generalized anxiety disorder (SHENG) is a mental health disorder that involves worry that is not triggered by a specific event.  · People with SHENG often worry excessively about many things in their lives, such as their health and  family.  · SHENG may cause physical symptoms such as restlessness, trouble concentrating, sleep problems, frequent sweating, nausea, diarrhea, headaches, and trembling or muscle twitching.  · A mental health specialist can help determine which treatment is best for you. Some people see improvement with one type of therapy. However, other people require a combination of therapies.  This information is not intended to replace advice given to you by your health care provider. Make sure you discuss any questions you have with your health care provider.  Document Released: 04/14/2014 Document Revised: 11/30/2018 Document Reviewed: 11/07/2017  [employer] Patient Education © 2020 [employer]      Living With Depression  Everyone experiences occasional disappointment, sadness, and loss in their lives. When you are feeling down, blue, or sad for at least 2 weeks in a row, it may mean that you have depression. Depression can affect your thoughts and feelings, relationships, daily activities, and physical health. It is caused by changes in the way your brain functions. If you receive a diagnosis of depression, your health care provider will tell you which type of depression you have and what treatment options are available to you.  If you are living with depression, there are ways to help you recover from it and also ways to prevent it from coming back.  How to cope with lifestyle changes  Coping with stress         Stress is your body’s reaction to life changes and events, both good and bad. Stressful situations may include:  · Getting .  · The death of a spouse.  · Losing a job.  · Retiring.  · Having a baby.  Stress can last just a few hours or it can be ongoing. Stress can play a major role in depression, so it is important to learn both how to cope with stress and how to think about it differently.  Talk with your health care provider or a counselor if you would like to learn more about stress reduction. He or she  may suggest some stress reduction techniques, such as:  · Music therapy. This can include creating music or listening to music. Choose music that you enjoy and that inspires you.  · Mindfulness-based meditation. This kind of meditation can be done while sitting or walking. It involves being aware of your normal breaths, rather than trying to control your breathing.  · Centering prayer. This is a kind of meditation that involves focusing on a spiritual word or phrase. Choose a word, phrase, or sacred image that is meaningful to you and that brings you peace.  · Deep breathing. To do this, expand your stomach and inhale slowly through your nose. Hold your breath for 3-5 seconds, then exhale slowly, allowing your stomach muscles to relax.  · Muscle relaxation. This involves intentionally tensing muscles then relaxing them.  Choose a stress reduction technique that fits your lifestyle and personality. Stress reduction techniques take time and practice to develop. Set aside 5-15 minutes a day to do them. Therapists can offer training in these techniques. The training may be covered by some insurance plans. Other things you can do to manage stress include:  · Keeping a stress diary. This can help you learn what triggers your stress and ways to control your response.  · Understanding what your limits are and saying no to requests or events that lead to a schedule that is too full.  · Thinking about how you respond to certain situations. You may not be able to control everything, but you can control how you react.  · Adding humor to your life by watching funny films or TV shows.  · Making time for activities that help you relax and not feeling guilty about spending your time this way.    Medicines  Your health care provider may suggest certain medicines if he or she feels that they will help improve your condition. Avoid using alcohol and other substances that may prevent your medicines from working properly (may interact).  It is also important to:  · Talk with your pharmacist or health care provider about all the medicines that you take, their possible side effects, and what medicines are safe to take together.  · Make it your goal to take part in all treatment decisions (shared decision-making). This includes giving input on the side effects of medicines. It is best if shared decision-making with your health care provider is part of your total treatment plan.  If your health care provider prescribes a medicine, you may not notice the full benefits of it for 4-8 weeks. Most people who are treated for depression need to be on medicine for at least 6-12 months after they feel better. If you are taking medicines as part of your treatment, do not stop taking medicines without first talking to your health care provider. You may need to have the medicine slowly decreased (tapered) over time to decrease the risk of harmful side effects.  Relationships  Your health care provider may suggest family therapy along with individual therapy and drug therapy. While there may not be family problems that are causing you to feel depressed, it is still important to make sure your family learns as much as they can about your mental health. Having your family’s support can help make your treatment successful.  How to recognize changes in your condition  Everyone has a different response to treatment for depression. Recovery from major depression happens when you have not had signs of major depression for two months. This may mean that you will start to:  · Have more interest in doing activities.  · Feel less hopeless than you did 2 months ago.  · Have more energy.  · Overeat less often, or have better or improving appetite.  · Have better concentration.  Your health care provider will work with you to decide the next steps in your recovery. It is also important to recognize when your condition is getting worse. Watch for these signs:  · Having fatigue or  low energy.  · Eating too much or too little.  · Sleeping too much or too little.  · Feeling restless, agitated, or hopeless.  · Having trouble concentrating or making decisions.  · Having unexplained physical complaints.  · Feeling irritable, angry, or aggressive.  Get help as soon as you or your family members notice these symptoms coming back.  How to get support and help from others  How to talk with friends and family members about your condition    Talking to friends and family members about your condition can provide you with one way to get support and guidance. Reach out to trusted friends or family members, explain your symptoms to them, and let them know that you are working with a health care provider to treat your depression.  Financial resources  Not all insurance plans cover mental health care, so it is important to check with your insurance carrier. If paying for co-pays or counseling services is a problem, search for a local or [facility] mental health care center. They may be able to offer public mental health care services at low or no cost when you are not able to see a private health care provider.  If you are taking medicine for depression, you may be able to get the generic form, which may be less expensive. Some makers of prescription medicines also offer help to patients who cannot afford the medicines they need.  Follow these instructions at home:    · Get the right amount and quality of sleep.  · Cut down on using caffeine, tobacco, alcohol, and other potentially harmful substances.  · Try to exercise, such as walking or lifting small weights.  · Take over-the-counter and prescription medicines only as told by your health care provider.  · Eat a healthy diet that includes plenty of vegetables, fruits, whole grains, low-fat dairy products, and lean protein. Do not eat a lot of foods that are high in solid fats, added sugars, or salt.  · Keep all follow-up visits as told by your health care  provider. This is important.  Contact a health care provider if:  · You stop taking your antidepressant medicines, and you have any of these symptoms:  ? Nausea.  ? Headache.  ? Feeling lightheaded.  ? Chills and body aches.  ? Not being able to sleep (insomnia).  · You or your friends and family think your depression is getting worse.  Get help right away if:  · You have thoughts of hurting yourself or others.  If you ever feel like you may hurt yourself or others, or have thoughts about taking your own life, get help right away. You can go to your nearest emergency department or call:  · Your local emergency services (911 in the [place]  · A suicide crisis helpline, such as the National Suicide Prevention Lifeline at 1-773.894.4114. This is open 24-hours a day.  Summary  · If you are living with depression, there are ways to help you recover from it and also ways to prevent it from coming back.  · Work with your health care team to create a management plan that includes counseling, stress management techniques, and healthy lifestyle habits.  This information is not intended to replace advice given to you by your health care provider. Make sure you discuss any questions you have with your health care provider.  Document Released: 11/20/2017 Document Revised: 04/10/2020 Document Reviewed: 11/20/2017  [employer] Patient Education © 2020 [employer]      Chronic Back Pain  When back pain lasts longer than 3 months, it is called chronic back pain. The cause of your back pain may not be known. Some common causes include:  · Wear and tear (degenerative disease) of the bones, ligaments, or disks in your back.  · Inflammation and stiffness in your back (arthritis).  People who have chronic back pain often go through certain periods in which the pain is more intense (flare-ups). Many people can learn to manage the pain with home care.  Follow these instructions at home:  Pay attention to any changes in your symptoms. Take  these actions to help with your pain:  Activity    · Avoid bending and other activities that make the problem worse.  · Maintain a proper position when standing or sitting:  ? When standing, keep your upper back and neck straight, with your shoulders pulled back. Avoid slouching.  ? When sitting, keep your back straight and relax your shoulders. Do not round your shoulders or pull them backward.  · Do not sit or  one place for long periods of time.  · Take brief periods of rest throughout the day. This will reduce your pain. Resting in a lying or standing position is usually better than sitting to rest.  · When you are resting for longer periods, mix in some mild activity or stretching between periods of rest. This will help to prevent stiffness and pain.  · Get regular exercise. Ask your health care provider what activities are safe for you.  · Do not lift anything that is heavier than 10 lb (4.5 kg). Always use proper lifting technique, which includes:  ? Bending your knees.  ? Keeping the load close to your body.  ? Avoiding twisting.  · Sleep on a firm mattress in a comfortable position. Try lying on your side with your knees slightly bent. If you lie on your back, put a pillow under your knees.  Managing pain  · If directed, apply ice to the painful area. Your health care provider may recommend applying ice during the first 24-48 hours after a flare-up begins.  ? Put ice in a plastic bag.  ? Place a towel between your skin and the bag.  ? Leave the ice on for 20 minutes, 2-3 times per day.  · If directed, apply heat to the affected area as often as told by your health care provider. Use the heat source that your health care provider recommends, such as a moist heat pack or a heating pad.  ? Place a towel between your skin and the heat source.  ? Leave the heat on for 20-30 minutes.  ? Remove the heat if your skin turns bright red. This is especially important if you are unable to feel pain, heat, or  cold. You may have a greater risk of getting burned.  · Try soaking in a warm tub.  · Take over-the-counter and prescription medicines only as told by your health care provider.  · Keep all follow-up visits as told by your health care provider. This is important.  Contact a health care provider if:  · You have pain that is not relieved with rest or medicine.  Get help right away if:  · You have weakness or numbness in one or both of your legs or feet.  · You have trouble controlling your bladder or your bowels.  · You have nausea or vomiting.  · You have pain in your abdomen.  · You have shortness of breath or you faint.  This information is not intended to replace advice given to you by your health care provider. Make sure you discuss any questions you have with your health care provider.  Document Released: 01/25/2006 Document Revised: 04/09/2020 Document Reviewed: 06/27/2018  [employer] Patient Education © 2020 [employer]

## 2020-08-10 NOTE — PROGRESS NOTES
OFFICE VISIT NOTE:    Shanell Coffman is a 69 y.o. female who presents today for Anxiety (recheck); Back Pain (chronic); and GI Problem.     Showed me a picture on her phone of when she fell lately and had contusion to her right eye region.     Patient presents for follow-up of long term use of high risk medication (narcotics, sedatives, stimulants or other controlled medications). The patient has read and signed the [facility] Controlled Substance Contract - copy in chart and updated annually. A recent Jethro was reviewed and is present in the chart, updated annually and as needed. UDS has been reviewed and is up to date, and performed at least annually and PRN discretion of provider. No aberrant behavioral issues are noted, and no significant side effects/complications are present. The patient is aware of the potential for addiction and dependence of these medications and accepts responsibility for proper med use. Patient is aware of the PRN use of these medications (unless otherwise prescribed), and not to be used routinely.     Sees PM for her narcotics.     Also, needs refills for several meds for chronic stable conditions - for BP meds and antifungals.     Anxiety   Presents for follow-up visit. Symptoms include excessive worry, nausea and nervous/anxious behavior. Patient reports no chest pain, obsessions, palpitations, panic, restlessness, shortness of breath or suicidal ideas. Symptoms occur constantly. The severity of symptoms is causing significant distress. The quality of sleep is good. Nighttime awakenings: occasional.     Compliance with medications is %.   Back Pain   This is a chronic problem. The current episode started more than 1 year ago. The problem occurs constantly. The problem is unchanged. The pain is present in the lumbar spine and sacro-iliac. The quality of the pain is described as aching and cramping. The pain does not radiate. The pain is severe. The pain is the same all the  time. The symptoms are aggravated by standing and twisting. Stiffness is present all day. Associated symptoms include abdominal pain. Pertinent negatives include no chest pain, fever or weight loss. She has tried analgesics, bed rest and home exercises for the symptoms. The treatment provided significant relief.   GI Problem   The primary symptoms include abdominal pain and nausea. Primary symptoms do not include fever, weight loss, fatigue, melena, hematemesis or rash.   The illness is also significant for back pain.        Past medical/surgical history, Family history, Social history, Allergies and Medications have been reviewed with the patient today and are updated in [facility] EMR. See below.  Past Medical History:   Diagnosis Date   • Anxiety    • Chronic pain    • COPD (chronic obstructive pulmonary disease) (CMS/HCC)    • GERD (gastroesophageal reflux disease)    • Hyperlipidemia      Past Surgical History:   Procedure Laterality Date   • APPENDECTOMY     • CHOLECYSTECTOMY     • COLON SURGERY     • TUBAL ABDOMINAL LIGATION       Family History   Problem Relation Age of Onset   • Heart disease Mother    • Hyperlipidemia Mother    • Hypertension Mother    • Cancer Father    • Mental illness Father    • Diabetes Maternal Grandmother    • Heart attack Maternal Grandfather    • Obesity Paternal Grandmother    • Stroke Paternal Grandfather      Social History     Tobacco Use   • Smoking status: Never Smoker   • Smokeless tobacco: Never Used   Substance Use Topics   • Alcohol use: No     Frequency: Never     Binge frequency: Never   • Drug use: No       ALLERGIES:  Ceclor [cefaclor]; Keflex [cephalexin]; Stadol [butorphanol]; Sulfa antibiotics; and Seroquel [quetiapine]    CURRENT MEDS:    Current Outpatient Medications:   •  albuterol (PROVENTIL) (2.5 MG/3ML) 0.083% nebulizer solution, USE 3 ML PER NEBULIZER 4 TIMES A DAY, Disp: 375 mL, Rfl: 0  •  ALPRAZolam (Xanax) 1 MG tablet, Take 1 tablet by mouth 3 (Three) Times a  Day As Needed for Anxiety., Disp: 90 tablet, Rfl: 2  •  amLODIPine (NORVASC) 10 MG tablet, TAKE 1 TABLET BY MOUTH EVERY DAY, Disp: 90 tablet, Rfl: 1  •  baclofen (LIORESAL) 10 MG tablet, TAKE 1 TABLET BY MOUTH EVERY DAY, Disp: 90 tablet, Rfl: 0  •  Calcium Carbonate-Vitamin D (OYSTER SHELL CALCIUM 500 + D) 500-125 MG-UNIT tablet, TAKE 1 TABLET BY MOUTH WITH FOOD, Disp: 90 each, Rfl: 2  •  cloNIDine (CATAPRES) 0.2 MG tablet, TAKE 1 TABLET BY MOUTH 2 (TWO) TIMES A DAY. BEGIN EVENING OF 4/22/19, AND DC PATCHES, Disp: 180 tablet, Rfl: 1  •  dicyclomine (BENTYL) 10 MG capsule, TAKE 1 CAPSULE BY MOUTH FOUR TIMES A DAY AS NEEDED, Disp: 360 capsule, Rfl: 1  •  dicyclomine (BENTYL) 20 MG tablet, TAKE 1 TABLET BY MOUTH 4 TIMES A DAY PRB, Disp: , Rfl: 2  •  dilTIAZem (TIAZAC) 360 MG 24 hr capsule, TAKE 1 CAPSULE BY MOUTH EVERY DAY, Disp: 90 capsule, Rfl: 1  •  EPINEPHrine (EPIPEN) 0.3 MG/0.3ML solution auto-injector injection, INJECT 0.3 ML INTO THE APPROPRIATE MUSCLE AS DIRECTED BY PRESCRIBER 1 (ONE) TIME FOR 1 DOSE., Disp: , Rfl: 1  •  escitalopram (LEXAPRO) 20 MG tablet, Take 1 tablet by mouth Every Night., Disp: 90 tablet, Rfl: 1  •  fexofenadine-pseudoephedrine (ALLEGRA-D)  MG per 12 hr tablet, Take 1 tablet by mouth 2 (Two) Times a Day As Needed for Allergies (sinus congestion)., Disp: 30 tablet, Rfl: 2  •  furosemide (LASIX) 40 MG tablet, Take 1 tablet by mouth Daily., Disp: 90 tablet, Rfl: 1  •  ipratropium-albuterol (DUO-NEB) 0.5-2.5 mg/3 ml nebulizer, USE 1 VIAL PER NEBULIZER FOUR TIMES DAILY, Disp: , Rfl:   •  linaclotide (LINZESS) 72 MCG capsule capsule, Take 1 capsule by mouth Every Morning Before Breakfast., Disp: 30 capsule, Rfl: 5  •  losartan (COZAAR) 50 MG tablet, Take 100 mg by mouth Daily., Disp: , Rfl:   •  metoclopramide (REGLAN) 5 MG tablet, TAKE 1 TABLET BY MOUTH 4 TIMES A DAY AS NEEDED, Disp: 120 tablet, Rfl: 5  •  metoprolol succinate XL (TOPROL-XL) 50 MG 24 hr tablet, Take 1 tablet by mouth  Daily., Disp: 90 tablet, Rfl: 1  •  nabumetone (RELAFEN) 500 MG tablet, Take 1 tablet by mouth 2 (Two) Times a Day As Needed for Moderate Pain ., Disp: 60 tablet, Rfl: 5  •  nystatin (MYCOSTATIN) 422724 UNIT/GM powder, Apply  topically to the appropriate area as directed 3 (Three) Times a Day., Disp: 60 g, Rfl: 3  •  O2 (OXYGEN), Inhale 2 L/min 1 (One) Time., Disp: , Rfl:   •  ondansetron ODT (ZOFRAN-ODT) 4 MG disintegrating tablet, Place 1 tablet on the tongue Every 8 (Eight) Hours As Needed for Nausea or Vomiting., Disp: 30 tablet, Rfl: 0  •  pantoprazole (PROTONIX) 40 MG EC tablet, TAKE 1 TABLET BY MOUTH EVERY DAY, Disp: 90 tablet, Rfl: 1  •  potassium chloride ER (K-TAB) 20 MEQ tablet controlled-release ER tablet, Take 1 tablet by mouth Daily., Disp: 90 tablet, Rfl: 1  •  promethazine (PHENERGAN) 25 MG tablet, TAKE 1 TABLET(S) BY MOUTH DAILY AS NEEDED NAUSEA/VOMITING, Disp: , Rfl: 5  •  SPIRIVA RESPIMAT 2.5 MCG/ACT aerosol solution inhaler, INHALE 2 PUFFS BY MOUTH DAILY, Disp: 4 g, Rfl: 5  •  terbinafine (lamiSIL) 250 MG tablet, Take 1 tablet by mouth Daily., Disp: 14 tablet, Rfl: 0  •  terbinafine (LamISIL) 250 MG tablet, Take 1 tablet by mouth Daily., Disp: 14 tablet, Rfl: 2  •  traZODone (DESYREL) 150 MG tablet, Take 1 tablet by mouth Every Night., Disp: 30 tablet, Rfl: 5  •  VENTOLIN  (90 Base) MCG/ACT inhaler, INHALE 2 PUFFS EVERY 4 (FOUR) HOURS AS NEEDED FOR WHEEZING OR SHORTNESS OF AIR., Disp: 18 inhaler, Rfl: 5    REVIEW OF SYSTEMS:  Review of Systems   Constitutional: Negative for activity change, fatigue, fever, unexpected weight gain and unexpected weight loss.   Respiratory: Negative for shortness of breath.    Cardiovascular: Negative for chest pain and palpitations.   Gastrointestinal: Positive for abdominal pain and nausea. Negative for hematemesis and melena.   Genitourinary: Negative for difficulty urinating.   Musculoskeletal: Positive for back pain.   Skin: Negative for rash.  "  Neurological: Negative for syncope and headache.   Psychiatric/Behavioral: Negative for suicidal ideas. The patient is nervous/anxious.        PHYSICAL EXAMINATION:  Vital Signs:  /96 (BP Location: Left arm, Patient Position: Sitting, Cuff Size: Adult)   Pulse 69   Temp 98 °F (36.7 °C) (Skin)   Ht 152.4 cm (60\")   Wt 76.2 kg (168 lb)   LMP  (LMP Unknown)   SpO2 96% Comment: on 2 LPM of O2  BMI 32.81 kg/m²   Vitals:    08/10/20 0840   Patient Position: Sitting       Physical Exam   Constitutional: She is oriented to person, place, and time. She appears well-developed and well-nourished. No distress.   HENT:   Head: Normocephalic and atraumatic.   Mouth/Throat: Oropharynx is clear and moist.   Neck: Normal range of motion. Neck supple. No JVD present.   Cardiovascular: Normal rate, regular rhythm, normal heart sounds and intact distal pulses.   Pulmonary/Chest: Effort normal and breath sounds normal. No respiratory distress.   Musculoskeletal: Normal range of motion. She exhibits no edema.   Neurological: She is alert and oriented to person, place, and time. No cranial nerve deficit.   Skin: Skin is warm and dry. Capillary refill takes less than 2 seconds. No rash noted.   Psychiatric: She has a normal mood and affect. Her behavior is normal.   Nursing note and vitals reviewed.      Procedures    ASSESSMENT/ PLAN:  Problem List Items Addressed This Visit        Cardiovascular and Mediastinum    Essential hypertension    Relevant Medications    furosemide (LASIX) 40 MG tablet    metoprolol succinate XL (TOPROL-XL) 50 MG 24 hr tablet    potassium chloride ER (K-TAB) 20 MEQ tablet controlled-release ER tablet       Musculoskeletal and Integument    Tinea corporis    Relevant Medications    terbinafine (LamISIL) 250 MG tablet    nystatin (MYCOSTATIN) 886967 UNIT/GM powder       Other    Anxiety    Relevant Medications    ALPRAZolam (Xanax) 1 MG tablet    metoprolol succinate XL (TOPROL-XL) 50 MG 24 hr " tablet    Reactive depression    Relevant Medications    ALPRAZolam (Xanax) 1 MG tablet    traZODone (DESYREL) 150 MG tablet    escitalopram (LEXAPRO) 20 MG tablet      Other Visit Diagnoses     Primary insomnia        Relevant Medications    traZODone (DESYREL) 150 MG tablet            Specific Patient Instructions:  MEDICATION Instructions: Encouraged patient to continue routine medicines as prescribed and maintain compliance. Patient instructed to report any adverse side effects or reactions to medicines promptly to the office. Patient instructed to make us aware of any OTC or herbal meds or supplement use.    DIET Recommendations: Patient instructed and provided information on the following nutrition and diet recommendations: Calorie restriction for weight reduction and maintenance. Necessity for adequate daily intake of fluids/water. Also, diet information provided in AVS for specifics.    EXERCISE Instructions: Discussed with patient the need for routine aerobic activity for cardiovascular fitness, 3 times a week for about 30 minutes. Daily exercise for increased fitness and weight reduction goals.    SMOKING Recommendations: Counseled patient and encouraged them on smoking and tobacco cessation if applicable. Discussed the benefits to all body systems with smoking/tobacco cessation, including decreased cardiac/lung/stroke/cancer risk. Encouraged no vaping use.    HEALTH MAINTENANCE:  Counseling provided to patient/family about routine health maintenance and ANNUAL physicals/labs. Counseling on recommended Vaccinations appropriate for age needed.        Patient's Body mass index is 32.81 kg/m². BMI is above normal parameters. Recommendations include: exercise counseling and nutrition counseling.      Medications or Orders placed this visit:  No orders of the defined types were placed in this encounter.      Medications DISCONTINUED this visit:  Medications Discontinued During This Encounter   Medication Reason    • mirtazapine (REMERON) 15 MG tablet Alternate therapy   • ALPRAZolam (Xanax) 1 MG tablet Reorder   • traZODone (DESYREL) 100 MG tablet Reorder   • ketoconazole (NIZORAL) 2 % cream Alternate therapy   • spironolactone (ALDACTONE) 25 MG tablet Alternate therapy   • escitalopram (LEXAPRO) 20 MG tablet Reorder   • terbinafine (LAMISIL) 250 MG tablet Reorder   • nystatin (MYCOSTATIN) 510139 UNIT/GM powder Reorder   • furosemide (LASIX) 40 MG tablet Reorder   • metoprolol succinate XL (TOPROL-XL) 25 MG 24 hr tablet Reorder   • potassium chloride ER (K-TAB) 20 MEQ tablet controlled-release ER tablet Reorder   • doxepin (SINEquan) 50 MG capsule Alternate therapy       FOLLOW-UP:  Return for Recheck, Next scheduled follow up, Medicare Wellness.    I discussed the patients findings and my recommendations with patient.  An After Visit Summary (AVS) was printed and given to the patient at discharge.        Reji Jacobs MD, FAAFP  8/13/2020

## 2020-08-25 DIAGNOSIS — I10 ESSENTIAL HYPERTENSION: ICD-10-CM

## 2020-08-25 RX ORDER — SPIRONOLACTONE 25 MG/1
TABLET ORAL
Qty: 90 TABLET | Refills: 1 | OUTPATIENT
Start: 2020-08-25

## 2020-10-12 ENCOUNTER — CLINICAL SUPPORT (OUTPATIENT)
Dept: FAMILY MEDICINE CLINIC | Facility: CLINIC | Age: 70
End: 2020-10-12

## 2020-10-12 DIAGNOSIS — Z00.00 ENCOUNTER FOR MEDICARE ANNUAL WELLNESS EXAM: ICD-10-CM

## 2020-10-12 DIAGNOSIS — R53.83 FATIGUE, UNSPECIFIED TYPE: Primary | ICD-10-CM

## 2020-10-12 DIAGNOSIS — I10 ESSENTIAL HYPERTENSION: ICD-10-CM

## 2020-10-13 LAB
ALBUMIN SERPL-MCNC: 4 G/DL (ref 3.8–4.8)
ALBUMIN/GLOB SERPL: 1.4 {RATIO} (ref 1.2–2.2)
ALP SERPL-CCNC: 89 IU/L (ref 39–117)
ALT SERPL-CCNC: 7 IU/L (ref 0–32)
AST SERPL-CCNC: 18 IU/L (ref 0–40)
BASOPHILS # BLD AUTO: 0.1 X10E3/UL (ref 0–0.2)
BASOPHILS NFR BLD AUTO: 1 %
BILIRUB SERPL-MCNC: 0.3 MG/DL (ref 0–1.2)
BUN SERPL-MCNC: 16 MG/DL (ref 8–27)
BUN/CREAT SERPL: 15 (ref 12–28)
CALCIUM SERPL-MCNC: 9.6 MG/DL (ref 8.7–10.3)
CHLORIDE SERPL-SCNC: 98 MMOL/L (ref 96–106)
CHOLEST SERPL-MCNC: 198 MG/DL (ref 100–199)
CO2 SERPL-SCNC: 30 MMOL/L (ref 20–29)
CREAT SERPL-MCNC: 1.04 MG/DL (ref 0.57–1)
EOSINOPHIL # BLD AUTO: 0.3 X10E3/UL (ref 0–0.4)
EOSINOPHIL NFR BLD AUTO: 5 %
ERYTHROCYTE [DISTWIDTH] IN BLOOD BY AUTOMATED COUNT: 13.3 % (ref 11.7–15.4)
GLOBULIN SER CALC-MCNC: 2.9 G/DL (ref 1.5–4.5)
GLUCOSE SERPL-MCNC: 87 MG/DL (ref 65–99)
HCT VFR BLD AUTO: 38.9 % (ref 34–46.6)
HCV AB S/CO SERPL IA: 0.3 S/CO RATIO (ref 0–0.9)
HDLC SERPL-MCNC: 57 MG/DL
HGB BLD-MCNC: 12.9 G/DL (ref 11.1–15.9)
IMM GRANULOCYTES # BLD AUTO: 0 X10E3/UL (ref 0–0.1)
IMM GRANULOCYTES NFR BLD AUTO: 0 %
LDLC SERPL CALC-MCNC: 119 MG/DL (ref 0–99)
LDLC/HDLC SERPL: 2.1 RATIO (ref 0–3.2)
LYMPHOCYTES # BLD AUTO: 2.3 X10E3/UL (ref 0.7–3.1)
LYMPHOCYTES NFR BLD AUTO: 36 %
MCH RBC QN AUTO: 31.2 PG (ref 26.6–33)
MCHC RBC AUTO-ENTMCNC: 33.2 G/DL (ref 31.5–35.7)
MCV RBC AUTO: 94 FL (ref 79–97)
MONOCYTES # BLD AUTO: 0.9 X10E3/UL (ref 0.1–0.9)
MONOCYTES NFR BLD AUTO: 14 %
NEUTROPHILS # BLD AUTO: 2.8 X10E3/UL (ref 1.4–7)
NEUTROPHILS NFR BLD AUTO: 44 %
PLATELET # BLD AUTO: 221 X10E3/UL (ref 150–450)
POTASSIUM SERPL-SCNC: 3.9 MMOL/L (ref 3.5–5.2)
PROT SERPL-MCNC: 6.9 G/DL (ref 6–8.5)
RBC # BLD AUTO: 4.13 X10E6/UL (ref 3.77–5.28)
SODIUM SERPL-SCNC: 143 MMOL/L (ref 134–144)
T4 SERPL-MCNC: 10.5 UG/DL (ref 4.5–12)
TRIGL SERPL-MCNC: 126 MG/DL (ref 0–149)
TSH SERPL DL<=0.005 MIU/L-ACNC: 2.91 UIU/ML (ref 0.45–4.5)
VLDLC SERPL CALC-MCNC: 22 MG/DL (ref 5–40)
WBC # BLD AUTO: 6.3 X10E3/UL (ref 3.4–10.8)

## 2020-10-16 ENCOUNTER — OFFICE VISIT (OUTPATIENT)
Dept: FAMILY MEDICINE CLINIC | Facility: CLINIC | Age: 70
End: 2020-10-16

## 2020-10-16 VITALS
BODY MASS INDEX: 35.26 KG/M2 | WEIGHT: 179.6 LBS | HEART RATE: 77 BPM | SYSTOLIC BLOOD PRESSURE: 179 MMHG | DIASTOLIC BLOOD PRESSURE: 84 MMHG | OXYGEN SATURATION: 95 % | TEMPERATURE: 97.1 F | HEIGHT: 60 IN

## 2020-10-16 DIAGNOSIS — I10 ESSENTIAL HYPERTENSION: ICD-10-CM

## 2020-10-16 DIAGNOSIS — F41.9 ANXIETY: ICD-10-CM

## 2020-10-16 DIAGNOSIS — J43.1 PANLOBULAR EMPHYSEMA (HCC): ICD-10-CM

## 2020-10-16 DIAGNOSIS — Z00.00 MEDICARE ANNUAL WELLNESS VISIT, SUBSEQUENT: Primary | ICD-10-CM

## 2020-10-16 DIAGNOSIS — R05.9 COUGH: ICD-10-CM

## 2020-10-16 DIAGNOSIS — M47.819 ARTHRITIS, LOW BACK: ICD-10-CM

## 2020-10-16 DIAGNOSIS — R07.89 CHEST WALL PAIN: ICD-10-CM

## 2020-10-16 DIAGNOSIS — M62.830 LUMBAR PARASPINAL MUSCLE SPASM: ICD-10-CM

## 2020-10-16 PROCEDURE — G0439 PPPS, SUBSEQ VISIT: HCPCS | Performed by: FAMILY MEDICINE

## 2020-10-16 PROCEDURE — 96160 PT-FOCUSED HLTH RISK ASSMT: CPT | Performed by: FAMILY MEDICINE

## 2020-10-16 PROCEDURE — 96372 THER/PROPH/DIAG INJ SC/IM: CPT | Performed by: FAMILY MEDICINE

## 2020-10-16 RX ORDER — DEXTROMETHORPHAN HYDROBROMIDE AND PROMETHAZINE HYDROCHLORIDE 15; 6.25 MG/5ML; MG/5ML
5 SYRUP ORAL EVERY 6 HOURS PRN
Qty: 240 ML | Refills: 0 | Status: SHIPPED | OUTPATIENT
Start: 2020-10-16 | End: 2021-01-22

## 2020-10-16 RX ORDER — KETOROLAC TROMETHAMINE 30 MG/ML
30 INJECTION, SOLUTION INTRAMUSCULAR; INTRAVENOUS EVERY 6 HOURS PRN
Status: DISCONTINUED | OUTPATIENT
Start: 2020-10-16 | End: 2020-10-16

## 2020-10-16 RX ORDER — NABUMETONE 500 MG/1
500 TABLET, FILM COATED ORAL 2 TIMES DAILY PRN
Qty: 60 TABLET | Refills: 5 | Status: SHIPPED | OUTPATIENT
Start: 2020-10-16 | End: 2021-05-03

## 2020-10-16 RX ORDER — KETOROLAC TROMETHAMINE 30 MG/ML
30 INJECTION, SOLUTION INTRAMUSCULAR; INTRAVENOUS EVERY 6 HOURS PRN
Status: SHIPPED | OUTPATIENT
Start: 2020-10-16 | End: 2020-10-21

## 2020-10-16 RX ADMIN — KETOROLAC TROMETHAMINE 30 MG: 30 INJECTION, SOLUTION INTRAMUSCULAR; INTRAVENOUS at 17:07

## 2020-10-16 NOTE — PROGRESS NOTES
The ABCs of the Annual Wellness Visit  Subsequent Medicare Wellness Visit    Chief Complaint   Patient presents with   • Medicare Wellness-subsequent       Subjective   History of Present Illness:  Shanell Coffman is a 69 y.o. female who presents for a Subsequent Medicare Wellness Visit.    HEALTH RISK ASSESSMENT    Recent Hospitalizations:  No hospitalization(s) within the last year.  Labs great! Except . Will provide information on cholesterol diet.   Does have some left sided chest wall pain with deep breaths, and is reporducible on the left chest wall     Current Medical Providers:  Patient Care Team:  Reji Jacobs MD as PCP - General (Family Medicine)    Smoking Status:  Social History     Tobacco Use   Smoking Status Never Smoker   Smokeless Tobacco Never Used       Alcohol Consumption:  Social History     Substance and Sexual Activity   Alcohol Use No   • Frequency: Never   • Binge frequency: Never       Depression Screen:   PHQ-2/PHQ-9 Depression Screening 10/16/2020   Little interest or pleasure in doing things 0   Feeling down, depressed, or hopeless 0   Trouble falling or staying asleep, or sleeping too much -   Feeling tired or having little energy -   Poor appetite or overeating -   Feeling bad about yourself - or that you are a failure or have let yourself or your family down -   Trouble concentrating on things, such as reading the newspaper or watching television -   Moving or speaking so slowly that other people could have noticed. Or the opposite - being so fidgety or restless that you have been moving around a lot more than usual -   Thoughts that you would be better off dead, or of hurting yourself in some way -   Total Score 0   If you checked off any problems, how difficult have these problems made it for you to do your work, take care of things at home, or get along with other people? -       Fall Risk Screen:  STEADI Fall Risk Assessment was completed, and patient is at HIGH risk  for falls. Assessment completed on:10/16/2020    Health Habits and Functional and Cognitive Screening:  Functional & Cognitive Status 10/16/2020   Do you have difficulty preparing food and eating? No   Do you have difficulty bathing yourself, getting dressed or grooming yourself? No   Do you have difficulty using the toilet? No   Do you have difficulty moving around from place to place? No   Do you have trouble with steps or getting out of a bed or a chair? No   Current Diet Well Balanced Diet   Dental Exam Not up to date   Eye Exam Up to date   Exercise (times per week) 0 times per week   Current Exercise Activities Include None   Do you need help using the phone?  No   Are you deaf or do you have serious difficulty hearing?  No   Do you need help with transportation? Yes   Do you need help shopping? Yes   Do you need help preparing meals?  Yes   Do you need help with housework?  Yes   Do you need help with laundry? No   Do you need help taking your medications? No   Do you need help managing money? No   Do you ever drive or ride in a car without wearing a seat belt? No   Have you felt unusual stress, anger or loneliness in the last month? No   Who do you live with? Spouse   If you need help, do you have trouble finding someone available to you? No   Have you been bothered in the last four weeks by sexual problems? No   Do you have difficulty concentrating, remembering or making decisions? No         Does the patient have evidence of cognitive impairment? No    Asprin use counseling:Does not need ASA (and currently is not on it)    Age-appropriate Screening Schedule:  Refer to the list below for future screening recommendations based on patient's age, sex and/or medical conditions. Orders for these recommended tests are listed in the plan section. The patient has been provided with a written plan.    Health Maintenance   Topic Date Due   • MAMMOGRAM  08/29/2017   • ZOSTER VACCINE (1 of 2) 08/10/2021 (Originally  11/3/2001)   • TDAP/TD VACCINES (2 - Td) 09/22/2028 (Originally 7/8/2020)   • LIPID PANEL  10/12/2021   • INFLUENZA VACCINE  Completed   • COLONOSCOPY  Discontinued          The following portions of the patient's history were reviewed and updated as appropriate: allergies, current medications, past family history, past medical history, past social history, past surgical history and problem list.    Outpatient Medications Prior to Visit   Medication Sig Dispense Refill   • albuterol (PROVENTIL) (2.5 MG/3ML) 0.083% nebulizer solution USE 3 ML PER NEBULIZER 4 TIMES A  mL 0   • ALPRAZolam (Xanax) 1 MG tablet Take 1 tablet by mouth 3 (Three) Times a Day As Needed for Anxiety. 90 tablet 2   • amLODIPine (NORVASC) 10 MG tablet TAKE 1 TABLET BY MOUTH EVERY DAY 90 tablet 1   • baclofen (LIORESAL) 10 MG tablet TAKE 1 TABLET BY MOUTH EVERY DAY 90 tablet 0   • Calcium Carbonate-Vitamin D (OYSTER SHELL CALCIUM 500 + D) 500-125 MG-UNIT tablet TAKE 1 TABLET BY MOUTH WITH FOOD 90 each 2   • dilTIAZem (TIAZAC) 360 MG 24 hr capsule TAKE 1 CAPSULE BY MOUTH EVERY DAY 90 capsule 1   • EPINEPHrine (EPIPEN) 0.3 MG/0.3ML solution auto-injector injection INJECT 0.3 ML INTO THE APPROPRIATE MUSCLE AS DIRECTED BY PRESCRIBER 1 (ONE) TIME FOR 1 DOSE.  1   • escitalopram (LEXAPRO) 20 MG tablet Take 1 tablet by mouth Every Night. 90 tablet 1   • furosemide (LASIX) 40 MG tablet Take 1 tablet by mouth Daily. 90 tablet 1   • ipratropium-albuterol (DUO-NEB) 0.5-2.5 mg/3 ml nebulizer USE 1 VIAL PER NEBULIZER FOUR TIMES DAILY     • losartan (COZAAR) 50 MG tablet Take 100 mg by mouth Daily.     • metoprolol succinate XL (TOPROL-XL) 50 MG 24 hr tablet Take 1 tablet by mouth Daily. 90 tablet 1   • nystatin (MYCOSTATIN) 956071 UNIT/GM powder Apply  topically to the appropriate area as directed 3 (Three) Times a Day. 60 g 3   • O2 (OXYGEN) Inhale 2 L/min 1 (One) Time.     • ondansetron ODT (ZOFRAN-ODT) 4 MG disintegrating tablet Place 1 tablet on the  tongue Every 8 (Eight) Hours As Needed for Nausea or Vomiting. 30 tablet 0   • pantoprazole (PROTONIX) 40 MG EC tablet TAKE 1 TABLET BY MOUTH EVERY DAY 90 tablet 1   • potassium chloride ER (K-TAB) 20 MEQ tablet controlled-release ER tablet Take 1 tablet by mouth Daily. 90 tablet 1   • promethazine (PHENERGAN) 25 MG tablet TAKE 1 TABLET(S) BY MOUTH DAILY AS NEEDED NAUSEA/VOMITING  5   • SPIRIVA RESPIMAT 2.5 MCG/ACT aerosol solution inhaler INHALE 2 PUFFS BY MOUTH DAILY 4 g 5   • terbinafine (lamiSIL) 250 MG tablet Take 1 tablet by mouth Daily. 14 tablet 0   • traZODone (DESYREL) 150 MG tablet Take 1 tablet by mouth Every Night. 30 tablet 5   • nabumetone (RELAFEN) 500 MG tablet Take 1 tablet by mouth 2 (Two) Times a Day As Needed for Moderate Pain . 60 tablet 5   • cloNIDine (CATAPRES) 0.2 MG tablet TAKE 1 TABLET BY MOUTH 2 (TWO) TIMES A DAY. BEGIN EVENING OF 4/22/19, AND DC PATCHES 180 tablet 1   • dicyclomine (BENTYL) 10 MG capsule TAKE 1 CAPSULE BY MOUTH FOUR TIMES A DAY AS NEEDED 360 capsule 1   • dicyclomine (BENTYL) 20 MG tablet TAKE 1 TABLET BY MOUTH 4 TIMES A DAY PRB  2   • fexofenadine-pseudoephedrine (ALLEGRA-D)  MG per 12 hr tablet Take 1 tablet by mouth 2 (Two) Times a Day As Needed for Allergies (sinus congestion). 30 tablet 2   • linaclotide (LINZESS) 72 MCG capsule capsule Take 1 capsule by mouth Every Morning Before Breakfast. 30 capsule 5   • metoclopramide (REGLAN) 5 MG tablet TAKE 1 TABLET BY MOUTH 4 TIMES A DAY AS NEEDED 120 tablet 5   • terbinafine (LamISIL) 250 MG tablet Take 1 tablet by mouth Daily. 14 tablet 2   • VENTOLIN  (90 Base) MCG/ACT inhaler INHALE 2 PUFFS EVERY 4 (FOUR) HOURS AS NEEDED FOR WHEEZING OR SHORTNESS OF AIR. 18 inhaler 5     No facility-administered medications prior to visit.        Patient Active Problem List   Diagnosis   • Essential hypertension   • Mixed simple and mucopurulent chronic bronchitis (CMS/HCC)   • Anxiety   • Reactive depression   •  "Arthritis, low back   • Tinea corporis   • Lumbar paraspinal muscle spasm   • Onychomycosis   • Panlobular emphysema (CMS/HCC)       Advanced Care Planning:  ACP discussion was held with the patient during this visit. Patient does not have an advance directive, information provided.    Review of Systems   Constitutional: Negative for activity change, appetite change, fatigue, fever and unexpected weight change.   Respiratory: Positive for wheezing. Negative for cough and shortness of breath.    Cardiovascular: Negative for chest pain.   Gastrointestinal: Negative for abdominal pain.   Genitourinary: Negative for difficulty urinating.   Skin: Negative for rash.   Neurological: Negative for syncope and headaches.       Compared to one year ago, the patient feels her physical health is the same.  Compared to one year ago, the patient feels her mental health is the same.    Reviewed chart for potential of high risk medication in the elderly: yes  Reviewed chart for potential of harmful drug interactions in the elderly:yes    Objective         Vitals:    10/16/20 1006   BP: 179/84   BP Location: Right arm   Patient Position: Sitting   Cuff Size: Adult   Pulse: 77   Temp: 97.1 °F (36.2 °C)   TempSrc: Temporal   SpO2: 95%   Weight: 81.5 kg (179 lb 9.6 oz)   Height: 152.4 cm (60\")   PainSc:   8   PainLoc: Breast  Comment: Under breast area and wraps around to back       Body mass index is 35.08 kg/m².  Discussed the patient's BMI with her. The BMI is above average; BMI management plan is completed.    Physical Exam  Vitals signs and nursing note reviewed.   Constitutional:       General: She is not in acute distress.     Appearance: She is well-developed.   HENT:      Head: Normocephalic and atraumatic.      Nose: Nose normal.      Mouth/Throat:      Mouth: Mucous membranes are moist.      Pharynx: Oropharynx is clear.   Eyes:      Extraocular Movements: Extraocular movements intact.      Pupils: Pupils are equal, round, " and reactive to light.   Neck:      Musculoskeletal: Normal range of motion and neck supple.      Vascular: No JVD.   Cardiovascular:      Rate and Rhythm: Normal rate and regular rhythm.      Pulses: Normal pulses.      Heart sounds: Normal heart sounds.   Pulmonary:      Effort: Pulmonary effort is normal. No respiratory distress.      Breath sounds: Wheezing and rhonchi present.   Abdominal:      General: Bowel sounds are normal. There is no distension.      Palpations: Abdomen is soft.      Tenderness: There is no abdominal tenderness.   Musculoskeletal: Normal range of motion.      Right lower leg: No edema.      Left lower leg: No edema.   Skin:     General: Skin is warm and dry.      Capillary Refill: Capillary refill takes less than 2 seconds.      Findings: No rash.   Neurological:      General: No focal deficit present.      Mental Status: She is alert and oriented to person, place, and time.      Cranial Nerves: No cranial nerve deficit.   Psychiatric:         Mood and Affect: Mood normal.         Behavior: Behavior normal.         Lab Results   Component Value Date    GLU 87 10/12/2020    CHLPL 198 10/12/2020    TRIG 126 10/12/2020    HDL 57 10/12/2020     (H) 10/12/2020    VLDL 22 10/12/2020        Assessment/Plan   Medicare Risks and Personalized Health Plan  CMS Preventative Services Quick Reference  Advance Directive Discussion  Breast Cancer/Mammogram Screening  Cardiovascular risk  Colon Cancer Screening  Depression/Dysphoria  Diabetic Lab Screening   Inactivity/Sedentary  Lung Cancer Risk  Obesity/Overweight   Osteoprorosis Risk  Polypharmacy    The above risks/problems have been discussed with the patient.  Pertinent information has been shared with the patient in the After Visit Summary.  Follow up plans and orders are seen below in the Assessment/Plan Section.    Diagnoses and all orders for this visit:    1. Medicare annual wellness visit, subsequent (Primary)    2. Essential  hypertension    3. Panlobular emphysema (CMS/HCC)  -     promethazine-dextromethorphan (PROMETHAZINE-DM) 6.25-15 MG/5ML syrup; Take 5 mL by mouth Every 6 (Six) Hours As Needed for Cough.  Dispense: 240 mL; Refill: 0    4. Lumbar paraspinal muscle spasm    5. Anxiety    6. Arthritis, low back  -     nabumetone (RELAFEN) 500 MG tablet; Take 1 tablet by mouth 2 (Two) Times a Day As Needed for Moderate Pain .  Dispense: 60 tablet; Refill: 5  -     Discontinue: ketorolac (TORADOL) injection 30 mg  -     ketorolac (TORADOL) injection 30 mg    7. Chest wall pain  -     nabumetone (RELAFEN) 500 MG tablet; Take 1 tablet by mouth 2 (Two) Times a Day As Needed for Moderate Pain .  Dispense: 60 tablet; Refill: 5  -     Discontinue: ketorolac (TORADOL) injection 30 mg  -     ketorolac (TORADOL) injection 30 mg    8. Cough  -     promethazine-dextromethorphan (PROMETHAZINE-DM) 6.25-15 MG/5ML syrup; Take 5 mL by mouth Every 6 (Six) Hours As Needed for Cough.  Dispense: 240 mL; Refill: 0      Follow Up:  Return in about 3 months (around 1/16/2021) for Recheck, Next scheduled follow up.     An After Visit Summary and PPPS were given to the patient.

## 2020-10-25 DIAGNOSIS — I10 ESSENTIAL HYPERTENSION: ICD-10-CM

## 2020-10-27 RX ORDER — AMLODIPINE BESYLATE 10 MG/1
TABLET ORAL
Qty: 90 TABLET | Refills: 1 | Status: SHIPPED | OUTPATIENT
Start: 2020-10-27 | End: 2021-06-14 | Stop reason: SDUPTHER

## 2020-10-30 ENCOUNTER — TELEPHONE (OUTPATIENT)
Dept: FAMILY MEDICINE CLINIC | Facility: CLINIC | Age: 70
End: 2020-10-30

## 2020-10-30 DIAGNOSIS — J44.1 COPD WITH EXACERBATION (HCC): Primary | ICD-10-CM

## 2020-10-30 RX ORDER — AMOXICILLIN AND CLAVULANATE POTASSIUM 875; 125 MG/1; MG/1
1 TABLET, FILM COATED ORAL 2 TIMES DAILY
Qty: 14 TABLET | Refills: 0 | Status: SHIPPED | OUTPATIENT
Start: 2020-10-30 | End: 2021-01-22

## 2020-10-30 NOTE — TELEPHONE ENCOUNTER
Antibiotic has been sent to [facility] for patient; however, I highly recommend that she be tested for Covid.  If she wants to come here, I will need a telephonic visit prior.  If she refuses today, I urge her to go to ER of choice should symptoms worsen.

## 2020-10-30 NOTE — TELEPHONE ENCOUNTER
Can you advise please.  Patient has hx of COPD and lung disease and was recently seen in office for annual

## 2020-10-30 NOTE — TELEPHONE ENCOUNTER
Called and spoke with patient.  I observed patient sounded terrible.  I advised patient of providers instructions and offered patient a telephone visit and to be swabbed this afternoon at 3.  Patient stated she absolutely didn't have the strength to get out of the house today and stated that if she got any worse over the weekend she would go to the ER.

## 2020-10-30 NOTE — TELEPHONE ENCOUNTER
Pt called, very congested, coughing, fever and drainage. No available appt's, Pt did not wish to come in office. Pt is asking for medication to be sent to [facility] for her. Please advise.

## 2020-11-02 ENCOUNTER — TELEPHONE (OUTPATIENT)
Dept: FAMILY MEDICINE CLINIC | Facility: CLINIC | Age: 70
End: 2020-11-02

## 2020-11-02 NOTE — TELEPHONE ENCOUNTER
SALVADOR FROM [facility] IS REQUESING A CALL BACK TO BE UPDATED ABOUT A SCRIPT THEY HAVE FAXED OVER OVER PULL UP ADULT LARGE 100 IN QUAntity   UNDER PAD 60 in quantity   PAD 48 quantity     She states it faxed over on the 23rd and is requesting a call back     [facility]  [phone]

## 2020-11-02 NOTE — TELEPHONE ENCOUNTER
Request is here at the office.  It has been placed in providers bin to sign off on and then will be faxed.

## 2020-11-11 ENCOUNTER — TELEPHONE (OUTPATIENT)
Dept: FAMILY MEDICINE CLINIC | Facility: CLINIC | Age: 70
End: 2020-11-11

## 2020-11-11 DIAGNOSIS — J20.9 ACUTE BRONCHITIS, UNSPECIFIED ORGANISM: ICD-10-CM

## 2020-11-11 NOTE — TELEPHONE ENCOUNTER
Yes, needs COVID test given underlying lung issues. Lets use the Mucinex DM instead to allow for the secretions to thin and be easier to expectorate.

## 2020-11-11 NOTE — TELEPHONE ENCOUNTER
PATIENT STATES SHE HAS A COUGH AND A BAD HEADACHE X 1 WEEK WITH A LOW GRADE TEMP  AND IS REQUESTING COUGH SYRUP   promethazine CODEINE SYRUP     SHE STATES SHE HAS FINISHED HER AMOXICILLIN THAT GRAEME COX HAD GIVEN HER 3 DAYS AGO AND TOOK THE MUCINEX  DM     GOOD CONTACT NUMBER   [phone] (H)    SHE STATES SHE IS NOT SURE IF SHE SHOULD GET TESTED FOR COVID OR NOT WOULD LIKE TO BE ADVISED       VERIFIED   [facility]/pharmacy #[id number] - [address] - [phone]  - [phone]   [phone]

## 2020-11-12 RX ORDER — PROMETHAZINE HYDROCHLORIDE AND CODEINE PHOSPHATE 6.25; 1 MG/5ML; MG/5ML
SOLUTION ORAL
Qty: 240 ML | OUTPATIENT
Start: 2020-11-12

## 2020-11-12 NOTE — TELEPHONE ENCOUNTER
Called and spoke with patient and advised to get COVID tested.  Advised that since it has been a week since feeling bad she get rapid tested.  Patient is going to call [facility] to get on their schedule.  Also advised patient about taking Mucinex DM per Dr. Jacobs.  Patient voiced understanding.

## 2020-11-12 NOTE — TELEPHONE ENCOUNTER
Patient is requesting cough syrup.  No recent visit.  Nothing indicating cough.  Needs telehealth visit.

## 2020-11-24 DIAGNOSIS — B37.0 THRUSH: ICD-10-CM

## 2020-11-24 DIAGNOSIS — F41.9 ANXIETY: ICD-10-CM

## 2020-11-24 RX ORDER — TERBINAFINE HYDROCHLORIDE 250 MG/1
250 TABLET ORAL DAILY
Qty: 14 TABLET | Refills: 0 | Status: SHIPPED | OUTPATIENT
Start: 2020-11-24 | End: 2020-12-31

## 2020-11-24 RX ORDER — PANTOPRAZOLE SODIUM 40 MG/1
40 TABLET, DELAYED RELEASE ORAL DAILY
Qty: 90 TABLET | Refills: 1 | Status: SHIPPED | OUTPATIENT
Start: 2020-11-24 | End: 2021-06-14 | Stop reason: SDUPTHER

## 2020-11-24 RX ORDER — ALPRAZOLAM 1 MG/1
1 TABLET ORAL 3 TIMES DAILY PRN
Qty: 90 TABLET | Refills: 2 | Status: SHIPPED | OUTPATIENT
Start: 2020-11-24 | End: 2021-01-22 | Stop reason: SDUPTHER

## 2020-11-24 NOTE — TELEPHONE ENCOUNTER
Caller: Yessilaisha Shanell    Relationship: Self    Best call back number: [phone]    Medication needed:   Requested Prescriptions     Pending Prescriptions Disp Refills   • ALPRAZolam (Xanax) 1 MG tablet 90 tablet 2     Sig: Take 1 tablet by mouth 3 (Three) Times a Day As Needed for Anxiety.   • pantoprazole (PROTONIX) 40 MG EC tablet 90 tablet 1     Sig: Take 1 tablet by mouth Daily.   • terbinafine (lamiSIL) 250 MG tablet 14 tablet 0     Sig: Take 1 tablet by mouth Daily.       When do you need the refill by:   ASAP    What details did the patient provide when requesting the medication:   Patient is completely out of medication    Does the patient have less than a 3 day supply:  [x] Yes  [] No    What is the patient's preferred pharmacy: [facility]/PHARMACY #[id number] - [place], [place] - [address] [place] [address] [phone] [facility] [phone]

## 2020-12-22 DIAGNOSIS — M62.830 LUMBAR PARASPINAL MUSCLE SPASM: ICD-10-CM

## 2020-12-23 RX ORDER — BACLOFEN 10 MG/1
TABLET ORAL
Qty: 90 TABLET | Refills: 0 | Status: SHIPPED | OUTPATIENT
Start: 2020-12-23 | End: 2021-02-19

## 2020-12-29 DIAGNOSIS — R11.2 NON-INTRACTABLE VOMITING WITH NAUSEA, UNSPECIFIED VOMITING TYPE: ICD-10-CM

## 2020-12-29 DIAGNOSIS — B37.0 THRUSH: ICD-10-CM

## 2020-12-31 RX ORDER — ONDANSETRON 4 MG/1
TABLET, ORALLY DISINTEGRATING ORAL
Qty: 30 TABLET | Refills: 1 | Status: SHIPPED | OUTPATIENT
Start: 2020-12-31 | End: 2021-02-04 | Stop reason: SDUPTHER

## 2020-12-31 RX ORDER — TERBINAFINE HYDROCHLORIDE 250 MG/1
TABLET ORAL
Qty: 14 TABLET | Refills: 0 | Status: SHIPPED | OUTPATIENT
Start: 2020-12-31 | End: 2021-01-22 | Stop reason: ALTCHOICE

## 2021-01-04 ENCOUNTER — TELEPHONE (OUTPATIENT)
Dept: FAMILY MEDICINE CLINIC | Facility: CLINIC | Age: 71
End: 2021-01-04

## 2021-01-04 NOTE — TELEPHONE ENCOUNTER
Caller: Shanell Coffman    Relationship: Self    Best call back number: [phone]       What medication are you requesting: MEDICATION FOR YEAST INFECTION     What are your current symptoms: YEAST INFECTION       Have you had these symptoms before:    [x] Yes  [] No    Have you been treated for these symptoms before:   [x] Yes  [] No    If a prescription is needed, what is your preferred pharmacy and phone number:  [facility]/pharmacy #[id number] - [address] [phone] [facility] [phone]   [phone]    Additional notes: PATIENT IS REQUESTING A NEW MEDICATION FOR YEAT INFECTION. HER CURRENT MEDICATION SHE HAS FOR TREATMENT IS NO LONGER WORKING.

## 2021-01-05 RX ORDER — FLUCONAZOLE 150 MG/1
150 TABLET ORAL ONCE
Qty: 1 TABLET | Refills: 0 | Status: SHIPPED | OUTPATIENT
Start: 2021-01-05 | End: 2022-01-28

## 2021-01-22 ENCOUNTER — OFFICE VISIT (OUTPATIENT)
Dept: FAMILY MEDICINE CLINIC | Facility: CLINIC | Age: 71
End: 2021-01-22

## 2021-01-22 VITALS
HEIGHT: 60 IN | BODY MASS INDEX: 33.96 KG/M2 | HEART RATE: 66 BPM | DIASTOLIC BLOOD PRESSURE: 73 MMHG | TEMPERATURE: 97.5 F | OXYGEN SATURATION: 95 % | WEIGHT: 173 LBS | SYSTOLIC BLOOD PRESSURE: 190 MMHG

## 2021-01-22 DIAGNOSIS — I10 ESSENTIAL HYPERTENSION: ICD-10-CM

## 2021-01-22 DIAGNOSIS — F41.9 ANXIETY: Primary | ICD-10-CM

## 2021-01-22 DIAGNOSIS — H35.3131 EARLY DRY STAGE NONEXUDATIVE AGE-RELATED MACULAR DEGENERATION OF BOTH EYES: ICD-10-CM

## 2021-01-22 DIAGNOSIS — J30.2 SEASONAL ALLERGIC RHINITIS, UNSPECIFIED TRIGGER: ICD-10-CM

## 2021-01-22 DIAGNOSIS — B35.6 TINEA CRURIS: ICD-10-CM

## 2021-01-22 DIAGNOSIS — H65.23 BILATERAL CHRONIC SEROUS OTITIS MEDIA: ICD-10-CM

## 2021-01-22 DIAGNOSIS — J43.1 PANLOBULAR EMPHYSEMA (HCC): ICD-10-CM

## 2021-01-22 PROCEDURE — 99214 OFFICE O/P EST MOD 30 MIN: CPT | Performed by: FAMILY MEDICINE

## 2021-01-22 RX ORDER — ANTIOX #8/OM3/DHA/EPA/LUT/ZEAX 250-2.5 MG
1 CAPSULE ORAL 2 TIMES DAILY
Qty: 180 CAPSULE | Refills: 1 | Status: SHIPPED | OUTPATIENT
Start: 2021-01-22

## 2021-01-22 RX ORDER — LEVOCETIRIZINE DIHYDROCHLORIDE 5 MG/1
5 TABLET, FILM COATED ORAL EVERY EVENING
Qty: 30 TABLET | Refills: 2 | Status: SHIPPED | OUTPATIENT
Start: 2021-01-22 | End: 2021-04-19

## 2021-01-22 RX ORDER — ALPRAZOLAM 1 MG/1
1 TABLET ORAL 3 TIMES DAILY PRN
Qty: 90 TABLET | Refills: 2 | Status: SHIPPED | OUTPATIENT
Start: 2021-01-22 | End: 2021-04-07 | Stop reason: SDUPTHER

## 2021-01-22 RX ORDER — METOPROLOL SUCCINATE 100 MG/1
100 TABLET, EXTENDED RELEASE ORAL DAILY
Qty: 30 TABLET | Refills: 5 | Status: SHIPPED | OUTPATIENT
Start: 2021-01-22 | End: 2021-04-07 | Stop reason: SDUPTHER

## 2021-01-22 RX ORDER — KETOCONAZOLE 20 MG/G
CREAM TOPICAL DAILY
Qty: 60 G | Refills: 5 | Status: SHIPPED | OUTPATIENT
Start: 2021-01-22 | End: 2021-06-14 | Stop reason: SDUPTHER

## 2021-01-22 NOTE — PROGRESS NOTES
OFFICE VISIT NOTE:    Shanell Coffman is a 70 y.o. female who presents today for Anxiety (3 month med ov for Xanax) and Ear Problem (bilateral).     Her head had been pounding a bit lately - BP is noted to be up today.     A1AT testing recently was negative.     Patient presents for follow-up of long term use of high risk medication (narcotics, sedatives, stimulants or other controlled medications). The patient has read and signed the [facility] Controlled Substance Contract - copy in chart and updated annually. A recent Jethro was reviewed and is present in the chart, updated annually and as needed. UDS has been reviewed and is up to date, and performed at least annually and PRN discretion of provider. No aberrant behavioral issues are noted, and no significant side effects/complications are present. The patient is aware of the potential for addiction and dependence of these medications and accepts responsibility for proper med use. Patient is aware of the PRN use of these medications (unless otherwise prescribed), and not to be used routinely.     Also, terbinafine is not working very well now for intertriginous areas in the inguinal areas.     Hair loss noted now - using Tressemme but quit it.     Anxiety  Presents for follow-up visit. Symptoms include nervous/anxious behavior. Patient reports no chest pain, excessive worry, obsessions, palpitations, panic, restlessness, shortness of breath or suicidal ideas. Symptoms occur most days. The severity of symptoms is severe. The quality of sleep is good. Nighttime awakenings: occasional.     Compliance with medications is %.   Hypertension  This is a chronic problem. The current episode started more than 1 year ago. The problem is unchanged. The problem is uncontrolled. Associated symptoms include anxiety and headaches. Pertinent negatives include no chest pain, orthopnea, palpitations, peripheral edema, PND or shortness of breath. There are no associated  agents to hypertension. The current treatment provides significant improvement. There are no compliance problems.    Earache   There is pain in both ears. This is a recurrent problem. The current episode started 1 to 4 weeks ago. The problem occurs constantly. The problem has been waxing and waning. There has been no fever. Associated symptoms include headaches. Pertinent negatives include no abdominal pain or rash. She has tried acetaminophen for the symptoms. The treatment provided mild relief.        Past medical/surgical history, Family history, Social history, Allergies and Medications have been reviewed with the patient today and are updated in [facility] EMR. See below.  Past Medical History:   Diagnosis Date   • Anxiety    • Chronic pain    • COPD (chronic obstructive pulmonary disease) (CMS/HCC)    • GERD (gastroesophageal reflux disease)    • Hyperlipidemia      Past Surgical History:   Procedure Laterality Date   • APPENDECTOMY     • CHOLECYSTECTOMY     • COLON SURGERY     • TUBAL ABDOMINAL LIGATION       Family History   Problem Relation Age of Onset   • Heart disease Mother    • Hyperlipidemia Mother    • Hypertension Mother    • Cancer Father    • Mental illness Father    • Diabetes Maternal Grandmother    • Heart attack Maternal Grandfather    • Obesity Paternal Grandmother    • Stroke Paternal Grandfather      Social History     Tobacco Use   • Smoking status: Never Smoker   • Smokeless tobacco: Never Used   Substance Use Topics   • Alcohol use: No     Frequency: Never     Binge frequency: Never   • Drug use: No       ALLERGIES:  Ceclor [cefaclor], Keflex [cephalexin], Stadol [butorphanol], Sulfa antibiotics, and Seroquel [quetiapine]    CURRENT MEDS:    Current Outpatient Medications:   •  albuterol (PROVENTIL) (2.5 MG/3ML) 0.083% nebulizer solution, USE 3 ML PER NEBULIZER 4 TIMES A DAY, Disp: 375 mL, Rfl: 0  •  ALPRAZolam (Xanax) 1 MG tablet, Take 1 tablet by mouth 3 (Three) Times a Day As Needed for  Anxiety., Disp: 90 tablet, Rfl: 2  •  amLODIPine (NORVASC) 10 MG tablet, TAKE 1 TABLET BY MOUTH EVERY DAY, Disp: 90 tablet, Rfl: 1  •  baclofen (LIORESAL) 10 MG tablet, TAKE 1 TABLET BY MOUTH EVERY DAY, Disp: 90 tablet, Rfl: 0  •  Calcium Carbonate-Vitamin D (OYSTER SHELL CALCIUM 500 + D) 500-125 MG-UNIT tablet, TAKE 1 TABLET BY MOUTH WITH FOOD, Disp: 90 each, Rfl: 2  •  dilTIAZem (TIAZAC) 360 MG 24 hr capsule, TAKE 1 CAPSULE BY MOUTH EVERY DAY, Disp: 90 capsule, Rfl: 1  •  EPINEPHrine (EPIPEN) 0.3 MG/0.3ML solution auto-injector injection, INJECT 0.3 ML INTO THE APPROPRIATE MUSCLE AS DIRECTED BY PRESCRIBER 1 (ONE) TIME FOR 1 DOSE., Disp: , Rfl: 1  •  escitalopram (LEXAPRO) 20 MG tablet, Take 1 tablet by mouth Every Night., Disp: 90 tablet, Rfl: 1  •  furosemide (LASIX) 40 MG tablet, Take 1 tablet by mouth Daily., Disp: 90 tablet, Rfl: 1  •  ipratropium-albuterol (DUO-NEB) 0.5-2.5 mg/3 ml nebulizer, USE 1 VIAL PER NEBULIZER FOUR TIMES DAILY, Disp: , Rfl:   •  losartan (COZAAR) 50 MG tablet, Take 100 mg by mouth Daily., Disp: , Rfl:   •  metoprolol succinate XL (TOPROL-XL) 100 MG 24 hr tablet, Take 1 tablet by mouth Daily., Disp: 30 tablet, Rfl: 5  •  nabumetone (RELAFEN) 500 MG tablet, Take 1 tablet by mouth 2 (Two) Times a Day As Needed for Moderate Pain ., Disp: 60 tablet, Rfl: 5  •  nystatin (MYCOSTATIN) 142291 UNIT/GM powder, Apply  topically to the appropriate area as directed 3 (Three) Times a Day., Disp: 60 g, Rfl: 3  •  O2 (OXYGEN), Inhale 2 L/min 1 (One) Time., Disp: , Rfl:   •  ondansetron ODT (ZOFRAN-ODT) 4 MG disintegrating tablet, TAKE 1 TABLET BY MOUTH EVERY 8 (EIGHT) HOURS AS NEEDED FOR NAUSEA OR VOMITING., Disp: 30 tablet, Rfl: 1  •  pantoprazole (PROTONIX) 40 MG EC tablet, Take 1 tablet by mouth Daily., Disp: 90 tablet, Rfl: 1  •  potassium chloride ER (K-TAB) 20 MEQ tablet controlled-release ER tablet, Take 1 tablet by mouth Daily., Disp: 90 tablet, Rfl: 1  •  SPIRIVA RESPIMAT 2.5 MCG/ACT aerosol  "solution inhaler, INHALE 2 PUFFS BY MOUTH DAILY, Disp: 4 g, Rfl: 5  •  traZODone (DESYREL) 150 MG tablet, Take 1 tablet by mouth Every Night., Disp: 30 tablet, Rfl: 5  •  ketoconazole (NIZORAL) 2 % cream, Apply  topically to the appropriate area as directed Daily., Disp: 60 g, Rfl: 5  •  levocetirizine (Xyzal) 5 MG tablet, Take 1 tablet by mouth Every Evening., Disp: 30 tablet, Rfl: 2  •  multivitamins-minerals (PRESERVISION AREDS 2) capsule capsule, Take 1 capsule by mouth 2 (Two) Times a Day., Disp: 180 capsule, Rfl: 1    REVIEW OF SYSTEMS:  Review of Systems   Constitutional: Negative for activity change, fatigue, fever, unexpected weight gain and unexpected weight loss.   HENT: Positive for ear pain.    Respiratory: Negative for shortness of breath.    Cardiovascular: Negative for chest pain, palpitations, orthopnea and PND.   Gastrointestinal: Negative for abdominal pain.   Genitourinary: Negative for difficulty urinating.   Skin: Negative for rash.   Neurological: Negative for syncope and headache.   Psychiatric/Behavioral: Negative for suicidal ideas. The patient is nervous/anxious.        PHYSICAL EXAMINATION:  Vital Signs:  BP (!) 190/73 (BP Location: Right arm, Patient Position: Sitting, Cuff Size: Large Adult)   Pulse 66   Temp 97.5 °F (36.4 °C) (Infrared)   Ht 152.4 cm (60\")   Wt 78.5 kg (173 lb)   LMP  (LMP Unknown)   SpO2 95%   Breastfeeding No   BMI 33.79 kg/m²   Vitals:    01/22/21 1322   Patient Position: Sitting       Physical Exam  Vitals signs and nursing note reviewed.   Constitutional:       General: She is not in acute distress.     Appearance: She is well-developed.   HENT:      Head: Normocephalic and atraumatic.      Nose: Nose normal.      Mouth/Throat:      Mouth: Mucous membranes are moist.      Pharynx: Oropharynx is clear.   Eyes:      Extraocular Movements: Extraocular movements intact.      Pupils: Pupils are equal, round, and reactive to light.   Neck:      Musculoskeletal: " Normal range of motion and neck supple.      Vascular: No JVD.   Cardiovascular:      Rate and Rhythm: Normal rate and regular rhythm.      Pulses: Normal pulses.      Heart sounds: Normal heart sounds.   Pulmonary:      Effort: Pulmonary effort is normal. No respiratory distress.      Breath sounds: Normal breath sounds.   Abdominal:      General: Bowel sounds are normal. There is no distension.      Palpations: Abdomen is soft.      Tenderness: There is no abdominal tenderness.   Musculoskeletal: Normal range of motion.      Right lower leg: No edema.      Left lower leg: No edema.   Skin:     General: Skin is warm and dry.      Capillary Refill: Capillary refill takes less than 2 seconds.      Findings: No rash.   Neurological:      General: No focal deficit present.      Mental Status: She is alert and oriented to person, place, and time.      Cranial Nerves: No cranial nerve deficit.   Psychiatric:         Mood and Affect: Mood normal.         Behavior: Behavior normal.         Procedures    ASSESSMENT/ PLAN:  Problem List Items Addressed This Visit        Cardiac and Vasculature    Essential hypertension    Relevant Medications    metoprolol succinate XL (TOPROL-XL) 100 MG 24 hr tablet       Mental Health    Anxiety - Primary    Relevant Medications    ALPRAZolam (Xanax) 1 MG tablet    metoprolol succinate XL (TOPROL-XL) 100 MG 24 hr tablet       Pulmonary and Pneumonias    Panlobular emphysema (CMS/HCC)    Relevant Medications    levocetirizine (Xyzal) 5 MG tablet      Other Visit Diagnoses     Bilateral chronic serous otitis media        Relevant Medications    levocetirizine (Xyzal) 5 MG tablet    Seasonal allergic rhinitis, unspecified trigger        Relevant Medications    levocetirizine (Xyzal) 5 MG tablet    Early dry stage nonexudative age-related macular degeneration of both eyes        Relevant Medications    multivitamins-minerals (PRESERVISION AREDS 2) capsule capsule    Tinea cruris         Relevant Medications    ketoconazole (NIZORAL) 2 % cream            Specific Patient Instructions:  MEDICATION Instructions: Encouraged patient to continue routine medicines as prescribed and maintain compliance. Patient instructed to report any adverse side effects or reactions to medicines promptly to the office. Patient instructed to make us aware of any OTC or herbal meds or supplement use.    DIET Recommendations: Patient instructed and provided information on the following nutrition and diet recommendations: Calorie restriction for weight reduction and maintenance. Necessity for adequate daily intake of fluids/water. Also, diet information provided in AVS for specifics.    EXERCISE Instructions: Discussed with patient the need for routine aerobic activity for cardiovascular fitness, 3 times a week for about 30 minutes. Daily exercise for increased fitness and weight reduction goals.    SMOKING Recommendations: Counseled patient and encouraged them on smoking and tobacco cessation if applicable. Discussed the benefits to all body systems with smoking/tobacco cessation, including decreased cardiac/lung/stroke/cancer risk. Encouraged no vaping use.    HEALTH MAINTENANCE:  Counseling provided to patient/family about routine health maintenance and ANNUAL physicals/labs. Counseling on recommended Vaccinations appropriate for age needed.        Patient's Body mass index is 33.79 kg/m². BMI is above normal parameters. Recommendations include: exercise counseling and nutrition counseling.      Medications or Orders placed this visit:  No orders of the defined types were placed in this encounter.      Medications DISCONTINUED this visit:  Medications Discontinued During This Encounter   Medication Reason   • amoxicillin-clavulanate (Augmentin) 875-125 MG per tablet *Therapy completed   • promethazine (PHENERGAN) 25 MG tablet *Therapy completed   • promethazine-dextromethorphan (PROMETHAZINE-DM) 6.25-15 MG/5ML syrup  *Therapy completed   • ALPRAZolam (Xanax) 1 MG tablet Reorder   • terbinafine (lamiSIL) 250 MG tablet Alternate therapy   • metoprolol succinate XL (TOPROL-XL) 50 MG 24 hr tablet Reorder       FOLLOW-UP:  Return in about 3 months (around 4/22/2021) for Recheck, Next scheduled follow up.    I discussed the patients findings and my recommendations with patient.  An After Visit Summary (AVS) was printed and given to the patient at discharge.        Reji Jacobs MD, FAAFP  1/22/2021

## 2021-01-22 NOTE — PATIENT INSTRUCTIONS
Suspect Essential HTN.Good BP control is encouraged with Goal BP based on JNC 8 guidelines 2014 <140/90 for patients with known cardiac disease and diabetes. (MARQUIS. 2014:322 (5):507-520. doi:10.1001/marquis.2013.47278): general population <60 yr old goal BP <140/90 and for those >60 <150/90.  For patients of all ages with Diabetes, CKD, Known CAD <140/90. Recommended to the patient to obtain electronic home BP machine with upper arm blood pressure cuff and to check regularly as instructed.  Keep BP log and bring to subsequent visits. Stable, at goal.  a. LABS: routine for hypertension recommended and ordered if necessary.  b. Recommend if you do not have a home BP machine to obtain an electronic machine with arm blood pressure cuff.      c. Monitor BP over the next week and keep log to bring back to office. Discussed medication therapy however pt wants to try to control with diet exercise. .  Your provider  has recommended self-monitoring of your blood pressure.  If you do not have a blood pressure cuff you may purchase one from the local pharmacy.  You may ask the pharmacist which brand and model they recommend.  Obtain your blood pressure measurement at least 2x per week.  You should also check your blood pressure if you experience any symptoms of blurred visit, dizziness or headache.  Please record all blood pressure measurements and bring them to next office visit.  If you have any questions about the accuracy of your blood pressure machine please bring it in to the office and our staff will be happy to check accuracy.   d. Encouraged to eat a low sodium heart healthy diet  e. Offered handout on HTN educational topics.  These were provided if patient requested these today.  f. MEDS: as listed in today's visit.  g. Risks/benefits of current and new medications discussed with the patient and or family today.  The patient/family are aware and accept that if there any side effects they should call or return to clinic  as soon as possible.  Appropriate F/U discussed for topics addressed today. All questions were answered to the  satisfactory state of patient/family.  Should symptoms fail to improve or worsen they agree to call or return to clinic or to go to the ER. Education handouts were offered on any new Rx if requested.  Discussed the importance of following up with any needed screening tests/labs/specialist appointments and any requested follow-up recommended by me today.  Importance of maintaining follow-up discussed and patient accepts that missed appointments can delay diagnosis and potentially lead to worsening of conditions.

## 2021-02-04 DIAGNOSIS — R11.2 NON-INTRACTABLE VOMITING WITH NAUSEA, UNSPECIFIED VOMITING TYPE: ICD-10-CM

## 2021-02-04 RX ORDER — ONDANSETRON 4 MG/1
4 TABLET, ORALLY DISINTEGRATING ORAL EVERY 8 HOURS PRN
Qty: 30 TABLET | Refills: 1 | Status: SHIPPED | OUTPATIENT
Start: 2021-02-04 | End: 2021-04-07 | Stop reason: SDUPTHER

## 2021-02-04 NOTE — TELEPHONE ENCOUNTER
Pt called, requesting refill of nausea medication. Pt asked that this be sent to [facility].

## 2021-02-05 ENCOUNTER — TELEPHONE (OUTPATIENT)
Dept: FAMILY MEDICINE CLINIC | Facility: CLINIC | Age: 71
End: 2021-02-05

## 2021-02-05 NOTE — TELEPHONE ENCOUNTER
Returned call to Pt, Pt would like phone call regarding recent testing completed by her pain management physician. Results are in Pt's chart for Dr. Jacobs to review.

## 2021-02-05 NOTE — TELEPHONE ENCOUNTER
PATIENT WOULD LIKE A CALL BACK FROM NURSE ABOUT A APPOINTMENT PLEASE ADVISE   CALL BACK: [phone]

## 2021-02-19 DIAGNOSIS — F51.01 PRIMARY INSOMNIA: ICD-10-CM

## 2021-02-19 DIAGNOSIS — M62.830 LUMBAR PARASPINAL MUSCLE SPASM: ICD-10-CM

## 2021-02-19 RX ORDER — TRAZODONE HYDROCHLORIDE 150 MG/1
TABLET ORAL
Qty: 90 TABLET | Refills: 0 | Status: SHIPPED | OUTPATIENT
Start: 2021-02-19 | End: 2021-05-19

## 2021-02-19 RX ORDER — BACLOFEN 10 MG/1
TABLET ORAL
Qty: 90 TABLET | Refills: 0 | Status: SHIPPED | OUTPATIENT
Start: 2021-02-19 | End: 2021-04-07 | Stop reason: ALTCHOICE

## 2021-03-25 DIAGNOSIS — I10 ESSENTIAL HYPERTENSION: ICD-10-CM

## 2021-03-25 DIAGNOSIS — F32.9 REACTIVE DEPRESSION: ICD-10-CM

## 2021-03-25 DIAGNOSIS — F41.9 ANXIETY: ICD-10-CM

## 2021-03-25 RX ORDER — ESCITALOPRAM OXALATE 20 MG/1
TABLET ORAL
Qty: 30 TABLET | Refills: 1 | Status: SHIPPED | OUTPATIENT
Start: 2021-03-25 | End: 2021-04-07 | Stop reason: SDUPTHER

## 2021-03-25 RX ORDER — METOPROLOL SUCCINATE 50 MG/1
TABLET, EXTENDED RELEASE ORAL
Qty: 30 TABLET | Refills: 1 | Status: SHIPPED | OUTPATIENT
Start: 2021-03-25 | End: 2021-04-07 | Stop reason: DRUGHIGH

## 2021-04-07 ENCOUNTER — OFFICE VISIT (OUTPATIENT)
Dept: FAMILY MEDICINE CLINIC | Facility: CLINIC | Age: 71
End: 2021-04-07

## 2021-04-07 VITALS
BODY MASS INDEX: 33.1 KG/M2 | OXYGEN SATURATION: 94 % | HEIGHT: 60 IN | SYSTOLIC BLOOD PRESSURE: 200 MMHG | HEART RATE: 54 BPM | DIASTOLIC BLOOD PRESSURE: 78 MMHG | TEMPERATURE: 97.6 F | WEIGHT: 168.6 LBS

## 2021-04-07 DIAGNOSIS — M62.838 NECK MUSCLE SPASM: ICD-10-CM

## 2021-04-07 DIAGNOSIS — J43.1 PANLOBULAR EMPHYSEMA (HCC): ICD-10-CM

## 2021-04-07 DIAGNOSIS — F32.9 REACTIVE DEPRESSION: ICD-10-CM

## 2021-04-07 DIAGNOSIS — R11.2 NON-INTRACTABLE VOMITING WITH NAUSEA, UNSPECIFIED VOMITING TYPE: ICD-10-CM

## 2021-04-07 DIAGNOSIS — J41.8 MIXED SIMPLE AND MUCOPURULENT CHRONIC BRONCHITIS (HCC): ICD-10-CM

## 2021-04-07 DIAGNOSIS — F41.9 ANXIETY: Primary | ICD-10-CM

## 2021-04-07 DIAGNOSIS — I10 ESSENTIAL HYPERTENSION: ICD-10-CM

## 2021-04-07 PROCEDURE — 99214 OFFICE O/P EST MOD 30 MIN: CPT | Performed by: FAMILY MEDICINE

## 2021-04-07 RX ORDER — TIZANIDINE HYDROCHLORIDE 4 MG/1
4 CAPSULE, GELATIN COATED ORAL 3 TIMES DAILY PRN
Qty: 90 CAPSULE | Refills: 2 | Status: SHIPPED | OUTPATIENT
Start: 2021-04-07 | End: 2021-04-08 | Stop reason: ALTCHOICE

## 2021-04-07 RX ORDER — ALPRAZOLAM 1 MG/1
1 TABLET ORAL 3 TIMES DAILY PRN
Qty: 90 TABLET | Refills: 2 | Status: SHIPPED | OUTPATIENT
Start: 2021-04-07 | End: 2021-06-14 | Stop reason: SDUPTHER

## 2021-04-07 RX ORDER — ESCITALOPRAM OXALATE 20 MG/1
20 TABLET ORAL
Qty: 90 TABLET | Refills: 1 | Status: SHIPPED | OUTPATIENT
Start: 2021-04-07 | End: 2021-06-14 | Stop reason: SDUPTHER

## 2021-04-07 RX ORDER — LOSARTAN POTASSIUM 100 MG/1
100 TABLET ORAL DAILY
Qty: 30 TABLET | Refills: 5 | Status: SHIPPED | OUTPATIENT
Start: 2021-04-07 | End: 2021-06-14 | Stop reason: SDUPTHER

## 2021-04-07 RX ORDER — ONDANSETRON 4 MG/1
4 TABLET, ORALLY DISINTEGRATING ORAL EVERY 8 HOURS PRN
Qty: 30 TABLET | Refills: 1 | Status: SHIPPED | OUTPATIENT
Start: 2021-04-07

## 2021-04-07 RX ORDER — METOPROLOL SUCCINATE 100 MG/1
100 TABLET, EXTENDED RELEASE ORAL DAILY
Qty: 30 TABLET | Refills: 5 | Status: SHIPPED | OUTPATIENT
Start: 2021-04-07 | End: 2021-06-14 | Stop reason: SDUPTHER

## 2021-04-07 NOTE — PATIENT INSTRUCTIONS
Suspect Essential HTN.Good BP control is encouraged with Goal BP based on JNC 8 guidelines 2014 <140/90 for patients with known cardiac disease and diabetes. (MARQUIS. 2014:322 (5):507-520. doi:10.1001/marquis.2013.00641): general population <60 yr old goal BP <140/90 and for those >60 <150/90.  For patients of all ages with Diabetes, CKD, Known CAD <140/90. Recommended to the patient to obtain electronic home BP machine with upper arm blood pressure cuff and to check regularly as instructed.  Keep BP log and bring to subsequent visits. Stable, at goal.  a. LABS: routine for hypertension recommended and ordered if necessary.  b. Recommend if you do not have a home BP machine to obtain an electronic machine with arm blood pressure cuff.      c. Monitor BP over the next week and keep log to bring back to office. Discussed medication therapy however pt wants to try to control with diet exercise. .  Your provider  has recommended self-monitoring of your blood pressure.  If you do not have a blood pressure cuff you may purchase one from the local pharmacy.  You may ask the pharmacist which brand and model they recommend.  Obtain your blood pressure measurement at least 2x per week.  You should also check your blood pressure if you experience any symptoms of blurred visit, dizziness or headache.  Please record all blood pressure measurements and bring them to next office visit.  If you have any questions about the accuracy of your blood pressure machine please bring it in to the office and our staff will be happy to check accuracy.   d. Encouraged to eat a low sodium heart healthy diet  e. Offered handout on HTN educational topics.  These were provided if patient requested these today.  f. MEDS: as listed in today's visit.  g. Risks/benefits of current and new medications discussed with the patient and or family today.  The patient/family are aware and accept that if there any side effects they should call or return to clinic  "as soon as possible.  Appropriate F/U discussed for topics addressed today. All questions were answered to the  satisfactory state of patient/family.  Should symptoms fail to improve or worsen they agree to call or return to clinic or to go to the ER. Education handouts were offered on any new Rx if requested.  Discussed the importance of following up with any needed screening tests/labs/specialist appointments and any requested follow-up recommended by me today.  Importance of maintaining follow-up discussed and patient accepts that missed appointments can delay diagnosis and potentially lead to worsening of conditions.      [email or web address]">   Generalized Anxiety Disorder, Adult  Generalized anxiety disorder (SHENG) is a mental health condition. Unlike normal worries, anxiety related to SHENG is not triggered by a specific event. These worries do not fade or get better with time. SHENG interferes with relationships, work, and school.  SHENG symptoms can vary from mild to severe. People with severe SHENG can have intense waves of anxiety with physical symptoms that are similar to panic attacks.  What are the causes?  The exact cause of SHENG is not known, but the following are believed to have an impact:  · Differences in natural brain chemicals.  · Genes passed down from parents to children.  · Differences in the way threats are perceived.  · Development during childhood.  · Personality.  What increases the risk?  The following factors may make you more likely to develop this condition:  · Being female.  · Having a family history of anxiety disorders.  · Being very shy.  · Experiencing very stressful life events, such as the death of a loved one.  · Having a very stressful family environment.  What are the signs or symptoms?  People with SHENG often worry excessively about many things in their lives, such as their health and family. Symptoms may also include:  · Mental and emotional symptoms:  ? Worrying excessively about " natural disasters.  ? Fear of being late.  ? Difficulty concentrating.  ? Fears that others are judging your performance.  · Physical symptoms:  ? Fatigue.  ? Headaches, muscle tension, muscle twitches, trembling, or feeling shaky.  ? Feeling like your heart is pounding or beating very fast.  ? Feeling out of breath or like you cannot take a deep breath.  ? Having trouble falling asleep or staying asleep, or experiencing restlessness.  ? Sweating.  ? Nausea, diarrhea, or irritable bowel syndrome (IBS).  · Behavioral symptoms:  ? Experiencing erratic moods or irritability.  ? Avoidance of new situations.  ? Avoidance of people.  ? Extreme difficulty making decisions.  How is this diagnosed?  This condition is diagnosed based on your symptoms and medical history. You will also have a physical exam. Your health care provider may perform tests to rule out other possible causes of your symptoms.  To be diagnosed with SHENG, a person must have anxiety that:  · Is out of his or her control.  · Affects several different aspects of his or her life, such as work and relationships.  · Causes distress that makes him or her unable to take part in normal activities.  · Includes at least three symptoms of SHENG, such as restlessness, fatigue, trouble concentrating, irritability, muscle tension, or sleep problems.  Before your health care provider can confirm a diagnosis of SHENG, these symptoms must be present more days than they are not, and they must last for 6 months or longer.  How is this treated?  This condition may be treated with:  · Medicine. Antidepressant medicine is usually prescribed for long-term daily control. Anti-anxiety medicines may be added in severe cases, especially when panic attacks occur.  · Talk therapy (psychotherapy). Certain types of talk therapy can be helpful in treating SHENG by providing support, education, and guidance. Options include:  ? Cognitive behavioral therapy (CBT). People learn coping skills and  self-calming techniques to ease their physical symptoms. They learn to identify unrealistic thoughts and behaviors and to replace them with more appropriate thoughts and behaviors.  ? Acceptance and commitment therapy (ACT). This treatment teaches people how to be mindful as a way to cope with unwanted thoughts and feelings.  ? Biofeedback. This process trains you to manage your body's response (physiological response) through breathing techniques and relaxation methods. You will work with a therapist while machines are used to monitor your physical symptoms.  · Stress management techniques. These include yoga, meditation, and exercise.  A mental health specialist can help determine which treatment is best for you. Some people see improvement with one type of therapy. However, other people require a combination of therapies.  Follow these instructions at home:  Lifestyle  · Maintain a consistent routine and schedule.  · Anticipate stressful situations. Create a plan, and allow extra time to work with your plan.  · Practice stress management or self-calming techniques that you have learned from your therapist or your health care provider.  General instructions  · Take over-the-counter and prescription medicines only as told by your health care provider.  · Understand that you are likely to have setbacks. Accept this and be kind to yourself as you persist to take better care of yourself.  · Recognize and accept your accomplishments, even if you  them as small.  · Keep all follow-up visits as told by your health care provider. This is important.  Contact a health care provider if:  · Your symptoms do not get better.  · Your symptoms get worse.  · You have signs of depression, such as:  ? A persistently sad or irritable mood.  ? Loss of enjoyment in activities that used to bring you bella.  ? Change in weight or eating.  ? Changes in sleeping habits.  ? Avoiding friends or family members.  ? Loss of energy for normal  tasks.  ? Feelings of guilt or worthlessness.  Get help right away if:  · You have serious thoughts about hurting yourself or others.  If you ever feel like you may hurt yourself or others, or have thoughts about taking your own life, get help right away. Go to your nearest emergency department or:  · Call your local emergency services (911 in the [place]  · Call a suicide crisis helpline, such as the National Suicide Prevention Lifeline at 1-167.808.4181. This is open 24 hours a day in the [place]  · Text the Crisis Text Line at 963153 (in the [place]  Summary  · Generalized anxiety disorder (SHENG) is a mental health condition that involves worry that is not triggered by a specific event.  · People with SHENG often worry excessively about many things in their lives, such as their health and family.  · SHENG may cause symptoms such as restlessness, trouble concentrating, sleep problems, frequent sweating, nausea, diarrhea, headaches, and trembling or muscle twitching.  · A mental health specialist can help determine which treatment is best for you. Some people see improvement with one type of therapy. However, other people require a combination of therapies.  This information is not intended to replace advice given to you by your health care provider. Make sure you discuss any questions you have with your health care provider.  Document Revised: 10/07/2020 Document Reviewed: 10/07/2020  Elsevier Patient Education © 2021 Elsevier Inc.

## 2021-04-07 NOTE — PROGRESS NOTES
"OFFICE VISIT NOTE:    Shanell Coffman is a 70 y.o. female who presents today for Anxiety (3 month med ov for Xanax) and Sinusitis (Pt said head is \"swimming\").     268/140 BP on Sunday - HR is dropping to low 40's.   Reviewed the MRI's of neck and back done and revealed L5 interspace narrowed and C4 longitudinal ligament pressing on thecal sac, otherwise negative.   Did have some Flexeril in past and that helped some with the neck, but not resolved completely.     Anxiety  Presents for follow-up visit. Symptoms include nervous/anxious behavior. Patient reports no chest pain, excessive worry, obsessions, panic, shortness of breath or suicidal ideas. Symptoms occur most days. The severity of symptoms is causing significant distress. The quality of sleep is good. Nighttime awakenings: occasional.     Compliance with medications is %.   Sinusitis  This is a recurrent problem. The current episode started 1 to 4 weeks ago. The problem has been waxing and waning since onset. There has been no fever. Associated symptoms include sinus pressure. Pertinent negatives include no shortness of breath. Past treatments include acetaminophen and saline sprays. The treatment provided mild relief.        Past medical/surgical history, Family history, Social history, Allergies and Medications have been reviewed with the patient today and are updated in [facility] EMR. See below.  Past Medical History:   Diagnosis Date   • Anxiety    • Chronic pain    • COPD (chronic obstructive pulmonary disease) (CMS/HCC)    • GERD (gastroesophageal reflux disease)    • Hyperlipidemia      Past Surgical History:   Procedure Laterality Date   • APPENDECTOMY     • CHOLECYSTECTOMY     • COLON SURGERY     • TUBAL ABDOMINAL LIGATION       Family History   Problem Relation Age of Onset   • Heart disease Mother    • Hyperlipidemia Mother    • Hypertension Mother    • Cancer Father    • Mental illness Father    • Diabetes Maternal Grandmother    • Heart attack " Maternal Grandfather    • Obesity Paternal Grandmother    • Stroke Paternal Grandfather      Social History     Tobacco Use   • Smoking status: Never Smoker   • Smokeless tobacco: Never Used   Substance Use Topics   • Alcohol use: No   • Drug use: No       ALLERGIES:  Ceclor [cefaclor], Keflex [cephalexin], Stadol [butorphanol], Sulfa antibiotics, and Seroquel [quetiapine]    CURRENT MEDS:    Current Outpatient Medications:   •  albuterol (PROVENTIL) (2.5 MG/3ML) 0.083% nebulizer solution, USE 3 ML PER NEBULIZER 4 TIMES A DAY, Disp: 375 mL, Rfl: 0  •  ALPRAZolam (Xanax) 1 MG tablet, Take 1 tablet by mouth 3 (Three) Times a Day As Needed for Anxiety., Disp: 90 tablet, Rfl: 2  •  amLODIPine (NORVASC) 10 MG tablet, TAKE 1 TABLET BY MOUTH EVERY DAY, Disp: 90 tablet, Rfl: 1  •  Calcium Carbonate-Vitamin D (OYSTER SHELL CALCIUM 500 + D) 500-125 MG-UNIT tablet, TAKE 1 TABLET BY MOUTH WITH FOOD, Disp: 90 each, Rfl: 2  •  EPINEPHrine (EPIPEN) 0.3 MG/0.3ML solution auto-injector injection, INJECT 0.3 ML INTO THE APPROPRIATE MUSCLE AS DIRECTED BY PRESCRIBER 1 (ONE) TIME FOR 1 DOSE., Disp: , Rfl: 1  •  escitalopram (LEXAPRO) 20 MG tablet, Take 1 tablet by mouth every night at bedtime., Disp: 90 tablet, Rfl: 1  •  furosemide (LASIX) 40 MG tablet, Take 1 tablet by mouth Daily., Disp: 90 tablet, Rfl: 1  •  ketoconazole (NIZORAL) 2 % cream, Apply  topically to the appropriate area as directed Daily., Disp: 60 g, Rfl: 5  •  losartan (COZAAR) 100 MG tablet, Take 1 tablet by mouth Daily., Disp: 30 tablet, Rfl: 5  •  metoprolol succinate XL (TOPROL-XL) 100 MG 24 hr tablet, Take 1 tablet by mouth Daily., Disp: 30 tablet, Rfl: 5  •  multivitamins-minerals (PRESERVISION AREDS 2) capsule capsule, Take 1 capsule by mouth 2 (Two) Times a Day., Disp: 180 capsule, Rfl: 1  •  nabumetone (RELAFEN) 500 MG tablet, Take 1 tablet by mouth 2 (Two) Times a Day As Needed for Moderate Pain ., Disp: 60 tablet, Rfl: 5  •  nystatin (MYCOSTATIN) 806321  UNIT/GM powder, Apply  topically to the appropriate area as directed 3 (Three) Times a Day., Disp: 60 g, Rfl: 3  •  O2 (OXYGEN), Inhale 2 L/min 1 (One) Time., Disp: , Rfl:   •  ondansetron ODT (ZOFRAN-ODT) 4 MG disintegrating tablet, Place 1 tablet on the tongue Every 8 (Eight) Hours As Needed for Nausea or Vomiting., Disp: 30 tablet, Rfl: 1  •  pantoprazole (PROTONIX) 40 MG EC tablet, Take 1 tablet by mouth Daily., Disp: 90 tablet, Rfl: 1  •  potassium chloride ER (K-TAB) 20 MEQ tablet controlled-release ER tablet, Take 1 tablet by mouth Daily., Disp: 90 tablet, Rfl: 1  •  SPIRIVA RESPIMAT 2.5 MCG/ACT aerosol solution inhaler, INHALE 2 PUFFS BY MOUTH DAILY, Disp: 4 g, Rfl: 5  •  traZODone (DESYREL) 150 MG tablet, TAKE 1 TABLET BY MOUTH EVERY DAY AT NIGHT, Disp: 90 tablet, Rfl: 0  •  levocetirizine (XYZAL) 5 MG tablet, TAKE 1 TABLET BY MOUTH EVERY DAY IN THE EVENING, Disp: 90 tablet, Rfl: 1  •  promethazine (PHENERGAN) 25 MG tablet, Take 1 tablet by mouth Every 8 (Eight) Hours As Needed for Nausea or Vomiting., Disp: 12 tablet, Rfl: 0  •  tiZANidine (ZANAFLEX) 4 MG tablet, Take 1 tablet by mouth Every 8 (Eight) Hours As Needed for Muscle Spasms., Disp: 90 tablet, Rfl: 2    REVIEW OF SYSTEMS:  Review of Systems   Constitutional: Negative for activity change, fatigue, fever, unexpected weight gain and unexpected weight loss.   HENT: Positive for rhinorrhea and sinus pressure.    Respiratory: Negative for shortness of breath.    Cardiovascular: Negative for chest pain.   Gastrointestinal: Negative for abdominal pain.   Genitourinary: Negative for difficulty urinating.   Skin: Negative for rash.   Neurological: Negative for syncope and headache.   Psychiatric/Behavioral: Negative for suicidal ideas. The patient is nervous/anxious.        PHYSICAL EXAMINATION:  Vital Signs:  BP (!) 200/78 (BP Location: Right arm, Patient Position: Sitting, Cuff Size: Large Adult)   Pulse 54   Temp 97.6 °F (36.4 °C) (Infrared)   Ht  "152.4 cm (60\")   Wt 76.5 kg (168 lb 9.6 oz)   LMP  (LMP Unknown)   SpO2 94% Comment: Pt on O2  Breastfeeding No   BMI 32.93 kg/m²   Vitals:    04/07/21 1001   Patient Position: Sitting       Physical Exam  Vitals and nursing note reviewed.   Constitutional:       General: She is not in acute distress.     Appearance: She is well-developed.   HENT:      Head: Normocephalic and atraumatic.      Nose: Mucosal edema and congestion present.      Mouth/Throat:      Mouth: Mucous membranes are moist.      Pharynx: Oropharynx is clear.   Eyes:      Extraocular Movements: Extraocular movements intact.      Pupils: Pupils are equal, round, and reactive to light.   Neck:      Vascular: No JVD.   Cardiovascular:      Rate and Rhythm: Normal rate and regular rhythm.      Pulses: Normal pulses.      Heart sounds: Normal heart sounds.   Pulmonary:      Effort: Pulmonary effort is normal. No respiratory distress.      Breath sounds: Wheezing and rhonchi present.   Abdominal:      General: Bowel sounds are normal. There is no distension.      Palpations: Abdomen is soft.      Tenderness: There is no abdominal tenderness.   Musculoskeletal:         General: Normal range of motion.      Cervical back: Normal range of motion and neck supple.      Right lower leg: No edema.      Left lower leg: No edema.   Skin:     General: Skin is warm and dry.      Capillary Refill: Capillary refill takes less than 2 seconds.      Findings: No rash.   Neurological:      General: No focal deficit present.      Mental Status: She is alert and oriented to person, place, and time.      Cranial Nerves: No cranial nerve deficit.   Psychiatric:         Mood and Affect: Mood normal.         Behavior: Behavior normal.         Procedures    ASSESSMENT/ PLAN:  Problem List Items Addressed This Visit        Cardiac and Vasculature    Essential hypertension    Relevant Medications    metoprolol succinate XL (TOPROL-XL) 100 MG 24 hr tablet    losartan " (COZAAR) 100 MG tablet       Mental Health    Anxiety - Primary    Relevant Medications    ALPRAZolam (Xanax) 1 MG tablet    metoprolol succinate XL (TOPROL-XL) 100 MG 24 hr tablet    Reactive depression    Relevant Medications    ALPRAZolam (Xanax) 1 MG tablet    escitalopram (LEXAPRO) 20 MG tablet       Pulmonary and Pneumonias    Mixed simple and mucopurulent chronic bronchitis (CMS/HCC)    Panlobular emphysema (CMS/HCC)      Other Visit Diagnoses     Neck muscle spasm        Non-intractable vomiting with nausea, unspecified vomiting type        Relevant Medications    ondansetron ODT (ZOFRAN-ODT) 4 MG disintegrating tablet            Specific Patient Instructions:  MEDICATION Instructions: Encouraged patient to continue routine medicines as prescribed and maintain compliance. Patient instructed to report any adverse side effects or reactions to medicines promptly to the office. Patient instructed to make us aware of any OTC or herbal meds or supplement use.    DIET Recommendations: Patient instructed and provided information on the following nutrition and diet recommendations: Calorie restriction for weight reduction and maintenance. Necessity for adequate daily intake of fluids/water. Also, diet information provided in AVS for specifics.    EXERCISE Instructions: Discussed with patient the need for routine aerobic activity for cardiovascular fitness, 3 times a week for about 30 minutes. Daily exercise for increased fitness and weight reduction goals.    SMOKING Recommendations: Counseled patient and encouraged them on smoking and tobacco cessation if applicable. Discussed the benefits to all body systems with smoking/tobacco cessation, including decreased cardiac/lung/stroke/cancer risk. Encouraged no vaping use.    HEALTH MAINTENANCE:  Counseling provided to patient/family about routine health maintenance and ANNUAL physicals/labs. Counseling on recommended Vaccinations appropriate for age  needed.        Patient's Body mass index is 32.93 kg/m². BMI is above normal parameters. Recommendations include: exercise counseling and nutrition counseling.      Medications or Orders placed this visit:  No orders of the defined types were placed in this encounter.      Medications DISCONTINUED this visit:  Medications Discontinued During This Encounter   Medication Reason   • metoprolol succinate XL (TOPROL-XL) 50 MG 24 hr tablet Dose adjustment   • ALPRAZolam (Xanax) 1 MG tablet Reorder   • baclofen (LIORESAL) 10 MG tablet Alternate therapy   • ondansetron ODT (ZOFRAN-ODT) 4 MG disintegrating tablet Reorder   • escitalopram (LEXAPRO) 20 MG tablet Reorder   • ipratropium-albuterol (DUO-NEB) 0.5-2.5 mg/3 ml nebulizer Duplicate order   • dilTIAZem (TIAZAC) 360 MG 24 hr capsule Alternate therapy   • metoprolol succinate XL (TOPROL-XL) 100 MG 24 hr tablet Reorder   • losartan (COZAAR) 50 MG tablet Reorder       FOLLOW-UP:  Return in about 2 weeks (around 4/21/2021) for Recheck, Next scheduled follow up.    I discussed the patients findings and my recommendations with patient.  An After Visit Summary (AVS) was printed and given to the patient at discharge.        Reji Jacobs MD, FAAFP  4/7/2021

## 2021-04-08 ENCOUNTER — TELEPHONE (OUTPATIENT)
Dept: FAMILY MEDICINE CLINIC | Facility: CLINIC | Age: 71
End: 2021-04-08

## 2021-04-08 DIAGNOSIS — M62.838 NECK MUSCLE SPASM: ICD-10-CM

## 2021-04-08 DIAGNOSIS — M62.838 NECK MUSCLE SPASM: Primary | ICD-10-CM

## 2021-04-08 RX ORDER — TIZANIDINE 4 MG/1
4 TABLET ORAL EVERY 8 HOURS PRN
Qty: 90 TABLET | Refills: 2 | Status: SHIPPED | OUTPATIENT
Start: 2021-04-08 | End: 2021-08-30

## 2021-04-08 NOTE — TELEPHONE ENCOUNTER
Caller: Shanell Coffman    Relationship: Self    Best call back number: [phone]         When did you start taking these medications: HAS NOT STARTED    Which medication are you concerned about: TIZANIDINE    Who prescribed you this medication: DR PAULA    What are your concerns: COST        TiZANidine (ZANAFLEX) 4 MG capsule PATIENT SAID HER INSURANCE OVER COVERS THE TABLET AND NOT THE CAPSULE    PLEASE ADVISE

## 2021-04-16 ENCOUNTER — TELEPHONE (OUTPATIENT)
Dept: FAMILY MEDICINE CLINIC | Facility: CLINIC | Age: 71
End: 2021-04-16

## 2021-04-16 DIAGNOSIS — R11.0 NAUSEA: Primary | ICD-10-CM

## 2021-04-16 RX ORDER — PROMETHAZINE HYDROCHLORIDE 25 MG/1
25 TABLET ORAL EVERY 8 HOURS PRN
Qty: 12 TABLET | Refills: 0 | Status: SHIPPED | OUTPATIENT
Start: 2021-04-16 | End: 2021-04-28 | Stop reason: SDUPTHER

## 2021-04-16 NOTE — TELEPHONE ENCOUNTER
PATIENT CALLED IN REQUESTING SOMETHING TO BE CALLED  IN FOR VOMITING AND DIARRHEA    PATIENT STATES HER ZOFRAN IS NOT HELPING IT ALL     PLEASE SEND TO     [facility]/pharmacy #[id number] - [place], [place] - [facility] - [phone] [facility] [phone]   [phone]    GOOD CALL BACK   [phone]

## 2021-04-16 NOTE — TELEPHONE ENCOUNTER
Spoke with patient and advised meds have been sent and also of providers instructions. Patient voiced understanding

## 2021-04-19 DIAGNOSIS — J30.2 SEASONAL ALLERGIC RHINITIS, UNSPECIFIED TRIGGER: ICD-10-CM

## 2021-04-19 DIAGNOSIS — H65.23 BILATERAL CHRONIC SEROUS OTITIS MEDIA: ICD-10-CM

## 2021-04-19 RX ORDER — LEVOCETIRIZINE DIHYDROCHLORIDE 5 MG/1
TABLET, FILM COATED ORAL
Qty: 90 TABLET | Refills: 1 | Status: SHIPPED | OUTPATIENT
Start: 2021-04-19 | End: 2021-06-14 | Stop reason: SDUPTHER

## 2021-04-28 ENCOUNTER — OFFICE VISIT (OUTPATIENT)
Dept: FAMILY MEDICINE CLINIC | Facility: CLINIC | Age: 71
End: 2021-04-28

## 2021-04-28 VITALS
OXYGEN SATURATION: 93 % | SYSTOLIC BLOOD PRESSURE: 220 MMHG | WEIGHT: 168.4 LBS | HEIGHT: 60 IN | HEART RATE: 58 BPM | TEMPERATURE: 97.6 F | BODY MASS INDEX: 33.06 KG/M2 | DIASTOLIC BLOOD PRESSURE: 98 MMHG

## 2021-04-28 DIAGNOSIS — F41.9 ANXIETY: ICD-10-CM

## 2021-04-28 DIAGNOSIS — J43.1 PANLOBULAR EMPHYSEMA (HCC): ICD-10-CM

## 2021-04-28 DIAGNOSIS — R11.0 NAUSEA: ICD-10-CM

## 2021-04-28 DIAGNOSIS — I10 ESSENTIAL HYPERTENSION: Primary | ICD-10-CM

## 2021-04-28 PROCEDURE — 99214 OFFICE O/P EST MOD 30 MIN: CPT | Performed by: FAMILY MEDICINE

## 2021-04-28 RX ORDER — HYDRALAZINE HYDROCHLORIDE 25 MG/1
25 TABLET, FILM COATED ORAL 3 TIMES DAILY
Qty: 90 TABLET | Refills: 2 | Status: SHIPPED | OUTPATIENT
Start: 2021-04-28 | End: 2021-06-14 | Stop reason: SDUPTHER

## 2021-04-28 RX ORDER — PROMETHAZINE HYDROCHLORIDE 25 MG/1
25 TABLET ORAL EVERY 8 HOURS PRN
Qty: 30 TABLET | Refills: 0 | Status: SHIPPED | OUTPATIENT
Start: 2021-04-28 | End: 2021-10-27

## 2021-04-28 RX ORDER — DICYCLOMINE HYDROCHLORIDE 10 MG/1
10 CAPSULE ORAL 4 TIMES DAILY
COMMUNITY
Start: 2021-04-19 | End: 2021-06-14 | Stop reason: SDUPTHER

## 2021-04-30 DIAGNOSIS — R07.89 CHEST WALL PAIN: ICD-10-CM

## 2021-04-30 DIAGNOSIS — M47.819 ARTHRITIS, LOW BACK: ICD-10-CM

## 2021-05-03 RX ORDER — NABUMETONE 500 MG/1
500 TABLET, FILM COATED ORAL 2 TIMES DAILY PRN
Qty: 60 TABLET | Refills: 5 | Status: SHIPPED | OUTPATIENT
Start: 2021-05-03 | End: 2021-05-13 | Stop reason: SDUPTHER

## 2021-05-13 DIAGNOSIS — R07.89 CHEST WALL PAIN: ICD-10-CM

## 2021-05-13 DIAGNOSIS — M47.819 ARTHRITIS, LOW BACK: ICD-10-CM

## 2021-05-13 RX ORDER — NABUMETONE 500 MG/1
500 TABLET, FILM COATED ORAL 2 TIMES DAILY PRN
Qty: 60 TABLET | Refills: 5 | Status: SHIPPED | OUTPATIENT
Start: 2021-05-13 | End: 2021-06-14 | Stop reason: SDUPTHER

## 2021-05-13 NOTE — TELEPHONE ENCOUNTER
Caller: Shanell Coffman    Relationship: Self    Best call back number: [phone]     Medication needed:   Requested Prescriptions     Pending Prescriptions Disp Refills   • nabumetone (RELAFEN) 500 MG tablet 60 tablet 5     Sig: Take 1 tablet by mouth 2 (Two) Times a Day As Needed for Moderate Pain .       When do you need the refill by: ASAP    What additional details did the patient provide when requesting the medication: PATIENT IS OUT OF MEDICATION     Does the patient have less than a 3 day supply:  [x] Yes  [] No    What is the patient's preferred pharmacy: [facility]/PHARMACY #[id number] - [address] [phone] [facility] [phone]

## 2021-05-14 ENCOUNTER — TELEPHONE (OUTPATIENT)
Dept: FAMILY MEDICINE CLINIC | Facility: CLINIC | Age: 71
End: 2021-05-14

## 2021-05-14 DIAGNOSIS — R10.10 PAIN OF UPPER ABDOMEN: Primary | ICD-10-CM

## 2021-05-14 RX ORDER — METOCLOPRAMIDE 10 MG/1
10 TABLET ORAL
Qty: 60 TABLET | Refills: 0 | Status: SHIPPED | OUTPATIENT
Start: 2021-05-14 | End: 2021-05-19

## 2021-05-18 DIAGNOSIS — R10.10 PAIN OF UPPER ABDOMEN: ICD-10-CM

## 2021-05-19 DIAGNOSIS — F51.01 PRIMARY INSOMNIA: ICD-10-CM

## 2021-05-19 RX ORDER — METOCLOPRAMIDE 10 MG/1
10 TABLET ORAL
Qty: 60 TABLET | Refills: 0 | Status: SHIPPED | OUTPATIENT
Start: 2021-05-19 | End: 2021-06-14 | Stop reason: SDUPTHER

## 2021-05-19 RX ORDER — TRAZODONE HYDROCHLORIDE 150 MG/1
TABLET ORAL
Qty: 90 TABLET | Refills: 0 | Status: SHIPPED | OUTPATIENT
Start: 2021-05-19 | End: 2021-06-14 | Stop reason: SDUPTHER

## 2021-05-28 ENCOUNTER — TELEPHONE (OUTPATIENT)
Dept: FAMILY MEDICINE CLINIC | Facility: CLINIC | Age: 71
End: 2021-05-28

## 2021-05-28 DIAGNOSIS — B37.31 YEAST VAGINITIS: Primary | ICD-10-CM

## 2021-05-28 RX ORDER — TERBINAFINE HYDROCHLORIDE 250 MG/1
250 TABLET ORAL DAILY
Qty: 14 TABLET | Refills: 0 | Status: SHIPPED | OUTPATIENT
Start: 2021-05-28

## 2021-05-28 RX ORDER — KETOCONAZOLE 20 MG/G
CREAM TOPICAL DAILY
Qty: 30 G | Refills: 1 | Status: SHIPPED | OUTPATIENT
Start: 2021-05-28

## 2021-06-09 ENCOUNTER — TELEPHONE (OUTPATIENT)
Dept: FAMILY MEDICINE CLINIC | Facility: CLINIC | Age: 71
End: 2021-06-09

## 2021-06-09 NOTE — TELEPHONE ENCOUNTER
PATIENT CALLED IN STATING SHE NEEDS A STATEMENT ON OUR LETTER HEAD   PATIENT NEEDS TO GET A NEW SOCIAL SECURITY CARD     STATEMENT MUST HAVE     OUR LETTER HEAD   PATIENTS NAME      ADDRESS     AND THAT DR PAULA IS HER PCP    PATIENT STATES SHE CAN  ON HER APPOINTMENT 2021     GOOD CALL BACK WITH ANY QUESTIONS   [phone]

## 2021-06-14 ENCOUNTER — OFFICE VISIT (OUTPATIENT)
Dept: FAMILY MEDICINE CLINIC | Facility: CLINIC | Age: 71
End: 2021-06-14

## 2021-06-14 VITALS
HEIGHT: 60 IN | BODY MASS INDEX: 32 KG/M2 | TEMPERATURE: 98 F | WEIGHT: 163 LBS | SYSTOLIC BLOOD PRESSURE: 195 MMHG | OXYGEN SATURATION: 97 % | DIASTOLIC BLOOD PRESSURE: 82 MMHG | HEART RATE: 53 BPM

## 2021-06-14 DIAGNOSIS — F32.9 REACTIVE DEPRESSION: ICD-10-CM

## 2021-06-14 DIAGNOSIS — H65.23 BILATERAL CHRONIC SEROUS OTITIS MEDIA: ICD-10-CM

## 2021-06-14 DIAGNOSIS — R10.10 PAIN OF UPPER ABDOMEN: ICD-10-CM

## 2021-06-14 DIAGNOSIS — F41.9 ANXIETY: ICD-10-CM

## 2021-06-14 DIAGNOSIS — F51.01 PRIMARY INSOMNIA: ICD-10-CM

## 2021-06-14 DIAGNOSIS — Z79.899 LONG-TERM USE OF HIGH-RISK MEDICATION: ICD-10-CM

## 2021-06-14 DIAGNOSIS — M47.819 ARTHRITIS, LOW BACK: ICD-10-CM

## 2021-06-14 DIAGNOSIS — J30.2 SEASONAL ALLERGIC RHINITIS, UNSPECIFIED TRIGGER: ICD-10-CM

## 2021-06-14 DIAGNOSIS — I10 ESSENTIAL HYPERTENSION: Primary | ICD-10-CM

## 2021-06-14 DIAGNOSIS — R07.89 CHEST WALL PAIN: ICD-10-CM

## 2021-06-14 DIAGNOSIS — K21.9 GASTROESOPHAGEAL REFLUX DISEASE WITHOUT ESOPHAGITIS: ICD-10-CM

## 2021-06-14 PROCEDURE — 99214 OFFICE O/P EST MOD 30 MIN: CPT | Performed by: FAMILY MEDICINE

## 2021-06-14 RX ORDER — HYDRALAZINE HYDROCHLORIDE 25 MG/1
25 TABLET, FILM COATED ORAL 3 TIMES DAILY
Qty: 270 TABLET | Refills: 1 | Status: SHIPPED | OUTPATIENT
Start: 2021-06-14 | End: 2021-12-20 | Stop reason: SDUPTHER

## 2021-06-14 RX ORDER — TRAZODONE HYDROCHLORIDE 150 MG/1
150 TABLET ORAL
Qty: 90 TABLET | Refills: 1 | Status: SHIPPED | OUTPATIENT
Start: 2021-06-14 | End: 2022-01-31

## 2021-06-14 RX ORDER — METOPROLOL SUCCINATE 100 MG/1
100 TABLET, EXTENDED RELEASE ORAL DAILY
Qty: 90 TABLET | Refills: 1 | Status: SHIPPED | OUTPATIENT
Start: 2021-06-14 | End: 2021-12-20 | Stop reason: SDUPTHER

## 2021-06-14 RX ORDER — ESCITALOPRAM OXALATE 20 MG/1
20 TABLET ORAL
Qty: 90 TABLET | Refills: 1 | Status: SHIPPED | OUTPATIENT
Start: 2021-06-14 | End: 2021-09-23

## 2021-06-14 RX ORDER — NABUMETONE 500 MG/1
500 TABLET, FILM COATED ORAL 2 TIMES DAILY PRN
Qty: 180 TABLET | Refills: 1 | Status: SHIPPED | OUTPATIENT
Start: 2021-06-14

## 2021-06-14 RX ORDER — LEVOCETIRIZINE DIHYDROCHLORIDE 5 MG/1
5 TABLET, FILM COATED ORAL EVERY EVENING
Qty: 90 TABLET | Refills: 1 | Status: SHIPPED | OUTPATIENT
Start: 2021-06-14 | End: 2021-12-10

## 2021-06-14 RX ORDER — PANTOPRAZOLE SODIUM 40 MG/1
40 TABLET, DELAYED RELEASE ORAL DAILY
Qty: 90 TABLET | Refills: 1 | Status: SHIPPED | OUTPATIENT
Start: 2021-06-14 | End: 2021-09-02

## 2021-06-14 RX ORDER — POTASSIUM CHLORIDE 1500 MG/1
20 TABLET, FILM COATED, EXTENDED RELEASE ORAL DAILY
Qty: 90 TABLET | Refills: 1 | Status: SHIPPED | OUTPATIENT
Start: 2021-06-14 | End: 2022-03-01

## 2021-06-14 RX ORDER — DICYCLOMINE HYDROCHLORIDE 10 MG/1
10 CAPSULE ORAL 4 TIMES DAILY
Qty: 360 CAPSULE | Refills: 1 | Status: SHIPPED | OUTPATIENT
Start: 2021-06-14 | End: 2021-12-29

## 2021-06-14 RX ORDER — FUROSEMIDE 40 MG/1
40 TABLET ORAL DAILY
Qty: 90 TABLET | Refills: 1 | Status: SHIPPED | OUTPATIENT
Start: 2021-06-14

## 2021-06-14 RX ORDER — METOCLOPRAMIDE 10 MG/1
10 TABLET ORAL
Qty: 360 TABLET | Refills: 1 | Status: SHIPPED | OUTPATIENT
Start: 2021-06-14 | End: 2022-03-01

## 2021-06-14 RX ORDER — AMLODIPINE BESYLATE 10 MG/1
10 TABLET ORAL DAILY
Qty: 90 TABLET | Refills: 1 | Status: SHIPPED | OUTPATIENT
Start: 2021-06-14 | End: 2021-12-20 | Stop reason: SDUPTHER

## 2021-06-14 RX ORDER — LOSARTAN POTASSIUM 100 MG/1
100 TABLET ORAL DAILY
Qty: 90 TABLET | Refills: 1 | Status: SHIPPED | OUTPATIENT
Start: 2021-06-14 | End: 2021-12-10

## 2021-06-14 RX ORDER — OXYCODONE AND ACETAMINOPHEN 7.5; 325 MG/1; MG/1
TABLET ORAL
COMMUNITY
Start: 2021-06-07 | End: 2021-09-15 | Stop reason: SDUPTHER

## 2021-06-14 RX ORDER — ALPRAZOLAM 1 MG/1
1 TABLET ORAL 3 TIMES DAILY PRN
Qty: 90 TABLET | Refills: 2 | Status: SHIPPED | OUTPATIENT
Start: 2021-06-14 | End: 2021-09-15 | Stop reason: SDUPTHER

## 2021-06-14 NOTE — PROGRESS NOTES
OFFICE VISIT NOTE:    Shanell Coffman is a 70 y.o. female who presents today for Hypertension and Anxiety (XANAX).     Patient presents for follow-up of long term use of high risk medication (narcotics, sedatives, stimulants or other controlled medications). The patient has read and signed the [facility] Controlled Substance Contract - copy in chart and updated annually. A recent Jethro was reviewed and is present in the chart, updated annually and as needed. UDS has been reviewed and is up to date, and performed at least annually and PRN discretion of provider. No aberrant behavioral issues are noted, and no significant side effects/complications are present. The patient is aware of the potential for addiction and dependence of these medications and accepts responsibility for proper med use. Patient is aware of the PRN use of these medications (unless otherwise prescribed), and not to be used routinely.     Also, needs refills for several meds for chronic stable conditions, Rx's sent in for 90 days each.     Hypertension  This is a chronic problem. The current episode started more than 1 year ago. The problem is unchanged. The problem is uncontrolled. Associated symptoms include anxiety. Pertinent negatives include no chest pain, headaches, orthopnea, palpitations, peripheral edema, PND or shortness of breath. There are no associated agents to hypertension. The current treatment provides significant improvement. There are no compliance problems.    Anxiety  Presents for follow-up visit. Symptoms include nervous/anxious behavior. Patient reports no chest pain, excessive worry, obsessions, palpitations or shortness of breath. Symptoms occur most days. The severity of symptoms is causing significant distress. The quality of sleep is good. Nighttime awakenings: occasional.     Compliance with medications is %.        Past medical/surgical history, Family history, Social history, Allergies and Medications have  been reviewed with the patient today and are updated in [facility] EMR. See below.  Past Medical History:   Diagnosis Date   • Anxiety    • Chronic pain    • COPD (chronic obstructive pulmonary disease) (CMS/HCC)    • GERD (gastroesophageal reflux disease)    • Hyperlipidemia      Past Surgical History:   Procedure Laterality Date   • APPENDECTOMY     • CHOLECYSTECTOMY     • COLON SURGERY     • TUBAL ABDOMINAL LIGATION       Family History   Problem Relation Age of Onset   • Heart disease Mother    • Hyperlipidemia Mother    • Hypertension Mother    • Cancer Father    • Mental illness Father    • Diabetes Maternal Grandmother    • Heart attack Maternal Grandfather    • Obesity Paternal Grandmother    • Stroke Paternal Grandfather      Social History     Tobacco Use   • Smoking status: Never Smoker   • Smokeless tobacco: Never Used   Substance Use Topics   • Alcohol use: No   • Drug use: No       ALLERGIES:  Ceclor [cefaclor], Keflex [cephalexin], Stadol [butorphanol], Sulfa antibiotics, and Seroquel [quetiapine]    CURRENT MEDS:    Current Outpatient Medications:   •  albuterol (PROVENTIL) (2.5 MG/3ML) 0.083% nebulizer solution, USE 3 ML PER NEBULIZER 4 TIMES A DAY, Disp: 375 mL, Rfl: 0  •  ALPRAZolam (Xanax) 1 MG tablet, Take 1 tablet by mouth 3 (Three) Times a Day As Needed for Anxiety., Disp: 90 tablet, Rfl: 2  •  amLODIPine (NORVASC) 10 MG tablet, Take 1 tablet by mouth Daily., Disp: 90 tablet, Rfl: 1  •  Calcium Carbonate-Vitamin D (OYSTER SHELL CALCIUM 500 + D) 500-125 MG-UNIT tablet, TAKE 1 TABLET BY MOUTH WITH FOOD, Disp: 90 each, Rfl: 2  •  dicyclomine (BENTYL) 10 MG capsule, Take 1 capsule by mouth 4 (Four) Times a Day., Disp: 360 capsule, Rfl: 1  •  EPINEPHrine (EPIPEN) 0.3 MG/0.3ML solution auto-injector injection, INJECT 0.3 ML INTO THE APPROPRIATE MUSCLE AS DIRECTED BY PRESCRIBER 1 (ONE) TIME FOR 1 DOSE., Disp: , Rfl: 1  •  escitalopram (LEXAPRO) 20 MG tablet, Take 1 tablet by mouth every night at bedtime.,  Disp: 90 tablet, Rfl: 1  •  furosemide (LASIX) 40 MG tablet, Take 1 tablet by mouth Daily., Disp: 90 tablet, Rfl: 1  •  hydrALAZINE (APRESOLINE) 25 MG tablet, Take 1 tablet by mouth 3 (Three) Times a Day., Disp: 270 tablet, Rfl: 1  •  ketoconazole (NIZORAL) 2 % cream, Apply  topically to the appropriate area as directed Daily., Disp: 30 g, Rfl: 1  •  levocetirizine (XYZAL) 5 MG tablet, Take 1 tablet by mouth Every Evening., Disp: 90 tablet, Rfl: 1  •  losartan (COZAAR) 100 MG tablet, Take 1 tablet by mouth Daily., Disp: 90 tablet, Rfl: 1  •  metoclopramide (REGLAN) 10 MG tablet, Take 1 tablet by mouth 4 (Four) Times a Day Before Meals & at Bedtime., Disp: 360 tablet, Rfl: 1  •  metoprolol succinate XL (TOPROL-XL) 100 MG 24 hr tablet, Take 1 tablet by mouth Daily., Disp: 90 tablet, Rfl: 1  •  multivitamins-minerals (PRESERVISION AREDS 2) capsule capsule, Take 1 capsule by mouth 2 (Two) Times a Day., Disp: 180 capsule, Rfl: 1  •  nabumetone (RELAFEN) 500 MG tablet, Take 1 tablet by mouth 2 (Two) Times a Day As Needed for Moderate Pain ., Disp: 180 tablet, Rfl: 1  •  nystatin (MYCOSTATIN) 155933 UNIT/GM powder, Apply  topically to the appropriate area as directed 3 (Three) Times a Day., Disp: 60 g, Rfl: 3  •  O2 (OXYGEN), Inhale 2 L/min 1 (One) Time., Disp: , Rfl:   •  ondansetron ODT (ZOFRAN-ODT) 4 MG disintegrating tablet, Place 1 tablet on the tongue Every 8 (Eight) Hours As Needed for Nausea or Vomiting., Disp: 30 tablet, Rfl: 1  •  oxyCODONE-acetaminophen (PERCOCET) 7.5-325 MG per tablet, , Disp: , Rfl:   •  pantoprazole (PROTONIX) 40 MG EC tablet, Take 1 tablet by mouth Daily., Disp: 90 tablet, Rfl: 1  •  potassium chloride ER (K-TAB) 20 MEQ tablet controlled-release ER tablet, Take 1 tablet by mouth Daily., Disp: 90 tablet, Rfl: 1  •  promethazine (PHENERGAN) 25 MG tablet, Take 1 tablet by mouth Every 8 (Eight) Hours As Needed for Nausea or Vomiting., Disp: 30 tablet, Rfl: 0  •  SPIRIVA RESPIMAT 2.5 MCG/ACT  "aerosol solution inhaler, INHALE 2 PUFFS BY MOUTH DAILY, Disp: 4 g, Rfl: 5  •  terbinafine (lamiSIL) 250 MG tablet, Take 1 tablet by mouth Daily., Disp: 14 tablet, Rfl: 0  •  tiZANidine (ZANAFLEX) 4 MG tablet, Take 1 tablet by mouth Every 8 (Eight) Hours As Needed for Muscle Spasms., Disp: 90 tablet, Rfl: 2  •  traZODone (DESYREL) 150 MG tablet, Take 1 tablet by mouth every night at bedtime., Disp: 90 tablet, Rfl: 1  •  cloNIDine (Catapres) 0.1 MG tablet, Take 1 tablet by mouth 2 (Two) Times a Day., Disp: 120 tablet, Rfl: 1    REVIEW OF SYSTEMS:  Review of Systems   Constitutional: Negative for activity change, fatigue, fever, unexpected weight gain and unexpected weight loss.   Respiratory: Negative for shortness of breath.    Cardiovascular: Negative for chest pain, palpitations, orthopnea and PND.   Gastrointestinal: Negative for abdominal pain.   Genitourinary: Negative for difficulty urinating.   Skin: Negative for rash.   Neurological: Negative for syncope and headache.   Psychiatric/Behavioral: The patient is nervous/anxious.        PHYSICAL EXAMINATION:  Vital Signs:  BP (!) 195/82 (BP Location: Right arm, Patient Position: Sitting, Cuff Size: Large Adult)   Pulse 53   Temp 98 °F (36.7 °C)   Ht 152.4 cm (60\") Comment: pt reported  Wt 73.9 kg (163 lb)   LMP  (LMP Unknown)   SpO2 97% Comment: 2 liters  BMI 31.83 kg/m²   Vitals:    06/14/21 1310 06/14/21 1347   Patient Position: Sitting Sitting       Physical Exam  Vitals and nursing note reviewed.   Constitutional:       General: She is not in acute distress.     Appearance: She is well-developed.   HENT:      Head: Normocephalic and atraumatic.      Nose: Nose normal.      Mouth/Throat:      Mouth: Mucous membranes are moist.      Pharynx: Oropharynx is clear.   Eyes:      Extraocular Movements: Extraocular movements intact.      Pupils: Pupils are equal, round, and reactive to light.   Neck:      Vascular: No JVD.   Cardiovascular:      Rate and " Rhythm: Normal rate and regular rhythm.      Pulses: Normal pulses.      Heart sounds: Normal heart sounds.   Pulmonary:      Effort: Pulmonary effort is normal. No respiratory distress.      Breath sounds: Rhonchi present.   Abdominal:      General: Bowel sounds are normal. There is no distension.      Palpations: Abdomen is soft.      Tenderness: There is no abdominal tenderness.   Musculoskeletal:         General: Normal range of motion.      Cervical back: Normal range of motion and neck supple.      Right lower leg: No edema.      Left lower leg: No edema.   Skin:     General: Skin is warm and dry.      Capillary Refill: Capillary refill takes less than 2 seconds.      Findings: No rash.   Neurological:      General: No focal deficit present.      Mental Status: She is alert and oriented to person, place, and time.      Cranial Nerves: No cranial nerve deficit.   Psychiatric:         Mood and Affect: Mood normal.         Behavior: Behavior normal.         Procedures    ASSESSMENT/ PLAN:  Problem List Items Addressed This Visit        Cardiac and Vasculature    Essential hypertension - Primary    Relevant Medications    amLODIPine (NORVASC) 10 MG tablet    furosemide (LASIX) 40 MG tablet    hydrALAZINE (APRESOLINE) 25 MG tablet    losartan (COZAAR) 100 MG tablet    metoprolol succinate XL (TOPROL-XL) 100 MG 24 hr tablet    potassium chloride ER (K-TAB) 20 MEQ tablet controlled-release ER tablet       Mental Health    Anxiety    Relevant Medications    ALPRAZolam (Xanax) 1 MG tablet    metoprolol succinate XL (TOPROL-XL) 100 MG 24 hr tablet    Reactive depression    Relevant Medications    ALPRAZolam (Xanax) 1 MG tablet    escitalopram (LEXAPRO) 20 MG tablet    traZODone (DESYREL) 150 MG tablet       Neuro    Arthritis, low back    Relevant Medications    nabumetone (RELAFEN) 500 MG tablet      Other Visit Diagnoses     Long-term use of high-risk medication        Relevant Orders    Compliance Drug Analysis, Ur -  Urine, Clean Catch    Bilateral chronic serous otitis media        Relevant Medications    levocetirizine (XYZAL) 5 MG tablet    Seasonal allergic rhinitis, unspecified trigger        Relevant Medications    levocetirizine (XYZAL) 5 MG tablet    nabumetone (RELAFEN) 500 MG tablet    Pain of upper abdomen        Relevant Medications    metoclopramide (REGLAN) 10 MG tablet    Chest wall pain        Relevant Medications    nabumetone (RELAFEN) 500 MG tablet    Gastroesophageal reflux disease without esophagitis        Relevant Medications    dicyclomine (BENTYL) 10 MG capsule    metoclopramide (REGLAN) 10 MG tablet    pantoprazole (PROTONIX) 40 MG EC tablet    Primary insomnia        Relevant Medications    traZODone (DESYREL) 150 MG tablet            Specific Patient Instructions:  MEDICATION Instructions: Encouraged patient to continue routine medicines as prescribed and maintain compliance. Patient instructed to report any adverse side effects or reactions to medicines promptly to the office. Patient instructed to make us aware of any OTC or herbal meds or supplement use.    DIET Recommendations: Patient instructed and provided information on the following nutrition and diet recommendations: Calorie restriction for weight reduction and maintenance. Necessity for adequate daily intake of fluids/water. Also, diet information provided in AVS for specifics.    EXERCISE Instructions: Discussed with patient the need for routine aerobic activity for cardiovascular fitness, 3 times a week for about 30 minutes. Daily exercise for increased fitness and weight reduction goals.    SMOKING Recommendations: Counseled patient and encouraged them on smoking and tobacco cessation if applicable. Discussed the benefits to all body systems with smoking/tobacco cessation, including decreased cardiac/lung/stroke/cancer risk. Encouraged no vaping use.    HEALTH MAINTENANCE:  Counseling provided to patient/family about routine health  maintenance and ANNUAL physicals/labs. Counseling on recommended Vaccinations appropriate for age needed.        Patient's Body mass index is 31.83 kg/m². indicating that she is obese (BMI >30). Obesity-related health conditions include the following: hypertension and osteoarthritis. Obesity is unchanged. BMI is is above average; BMI management plan is completed. We discussed portion control and increasing exercise..      Medications or Orders placed this visit:  Orders Placed This Encounter   Procedures   • Compliance Drug Analysis, Ur - Urine, Clean Catch     Order Specific Question:   Release to patient     Answer:   Immediate       Medications DISCONTINUED this visit:  Medications Discontinued During This Encounter   Medication Reason   • ketoconazole (NIZORAL) 2 % cream Duplicate order   • ALPRAZolam (Xanax) 1 MG tablet Reorder   • furosemide (LASIX) 40 MG tablet Reorder   • potassium chloride ER (K-TAB) 20 MEQ tablet controlled-release ER tablet Reorder   • amLODIPine (NORVASC) 10 MG tablet Reorder   • pantoprazole (PROTONIX) 40 MG EC tablet Reorder   • escitalopram (LEXAPRO) 20 MG tablet Reorder   • metoprolol succinate XL (TOPROL-XL) 100 MG 24 hr tablet Reorder   • losartan (COZAAR) 100 MG tablet Reorder   • levocetirizine (XYZAL) 5 MG tablet Reorder   • dicyclomine (BENTYL) 10 MG capsule Reorder   • hydrALAZINE (APRESOLINE) 25 MG tablet Reorder   • nabumetone (RELAFEN) 500 MG tablet Reorder   • metoclopramide (REGLAN) 10 MG tablet Reorder   • traZODone (DESYREL) 150 MG tablet Reorder       FOLLOW-UP:  Return in about 3 months (around 9/14/2021) for Recheck, Next scheduled follow up.    I discussed the patients findings and my recommendations with patient.  An After Visit Summary (AVS) was printed and given to the patient at discharge.        Reji Jacobs MD, FAAFP  6/14/2021

## 2021-06-14 NOTE — PATIENT INSTRUCTIONS
"[email or web address]   Generalized Anxiety Disorder, Adult  Generalized anxiety disorder (SHENG) is a mental health condition. Unlike normal worries, anxiety related to SHENG is not triggered by a specific event. These worries do not fade or get better with time. SHENG interferes with relationships, work, and school.  SHENG symptoms can vary from mild to severe. People with severe SHENG can have intense waves of anxiety with physical symptoms that are similar to panic attacks.  What are the causes?  The exact cause of SHENG is not known, but the following are believed to have an impact:  · Differences in natural brain chemicals.  · Genes passed down from parents to children.  · Differences in the way threats are perceived.  · Development during childhood.  · Personality.  What increases the risk?  The following factors may make you more likely to develop this condition:  · Being female.  · Having a family history of anxiety disorders.  · Being very shy.  · Experiencing very stressful life events, such as the death of a loved one.  · Having a very stressful family environment.  What are the signs or symptoms?  People with SHENG often worry excessively about many things in their lives, such as their health and family. Symptoms may also include:  · Mental and emotional symptoms:  ? Worrying excessively about natural disasters.  ? Fear of being late.  ? Difficulty concentrating.  ? Fears that others are judging your performance.  · Physical symptoms:  ? Fatigue.  ? Headaches, muscle tension, muscle twitches, trembling, or feeling shaky.  ? Feeling like your heart is pounding or beating very fast.  ? Feeling out of breath or like you cannot take a deep breath.  ? Having trouble falling asleep or staying asleep, or experiencing restlessness.  ? Sweating.  ? Nausea, diarrhea, or irritable bowel syndrome (IBS).  · Behavioral symptoms:  ? Experiencing erratic moods or irritability.  ? Avoidance of new situations.  ? Avoidance of " people.  ? Extreme difficulty making decisions.  How is this diagnosed?  This condition is diagnosed based on your symptoms and medical history. You will also have a physical exam. Your health care provider may perform tests to rule out other possible causes of your symptoms.  To be diagnosed with SHENG, a person must have anxiety that:  · Is out of his or her control.  · Affects several different aspects of his or her life, such as work and relationships.  · Causes distress that makes him or her unable to take part in normal activities.  · Includes at least three symptoms of SHENG, such as restlessness, fatigue, trouble concentrating, irritability, muscle tension, or sleep problems.  Before your health care provider can confirm a diagnosis of SHENG, these symptoms must be present more days than they are not, and they must last for 6 months or longer.  How is this treated?  This condition may be treated with:  · Medicine. Antidepressant medicine is usually prescribed for long-term daily control. Anti-anxiety medicines may be added in severe cases, especially when panic attacks occur.  · Talk therapy (psychotherapy). Certain types of talk therapy can be helpful in treating SHENG by providing support, education, and guidance. Options include:  ? Cognitive behavioral therapy (CBT). People learn coping skills and self-calming techniques to ease their physical symptoms. They learn to identify unrealistic thoughts and behaviors and to replace them with more appropriate thoughts and behaviors.  ? Acceptance and commitment therapy (ACT). This treatment teaches people how to be mindful as a way to cope with unwanted thoughts and feelings.  ? Biofeedback. This process trains you to manage your body's response (physiological response) through breathing techniques and relaxation methods. You will work with a therapist while machines are used to monitor your physical symptoms.  · Stress management techniques. These include yoga,  meditation, and exercise.  A mental health specialist can help determine which treatment is best for you. Some people see improvement with one type of therapy. However, other people require a combination of therapies.  Follow these instructions at home:  Lifestyle  · Maintain a consistent routine and schedule.  · Anticipate stressful situations. Create a plan, and allow extra time to work with your plan.  · Practice stress management or self-calming techniques that you have learned from your therapist or your health care provider.  General instructions  · Take over-the-counter and prescription medicines only as told by your health care provider.  · Understand that you are likely to have setbacks. Accept this and be kind to yourself as you persist to take better care of yourself.  · Recognize and accept your accomplishments, even if you  them as small.  · Keep all follow-up visits as told by your health care provider. This is important.  Contact a health care provider if:  · Your symptoms do not get better.  · Your symptoms get worse.  · You have signs of depression, such as:  ? A persistently sad or irritable mood.  ? Loss of enjoyment in activities that used to bring you bella.  ? Change in weight or eating.  ? Changes in sleeping habits.  ? Avoiding friends or family members.  ? Loss of energy for normal tasks.  ? Feelings of guilt or worthlessness.  Get help right away if:  · You have serious thoughts about hurting yourself or others.  If you ever feel like you may hurt yourself or others, or have thoughts about taking your own life, get help right away. Go to your nearest emergency department or:  · Call your local emergency services (081 in the [place]  · Call a suicide crisis helpline, such as the National Suicide Prevention Lifeline at 1-803.886.2357. This is open 24 hours a day in the [place]  · Text the Crisis Text Line at 948305 (in the [place]  Summary  · Generalized anxiety disorder (SHENG) is a mental  health condition that involves worry that is not triggered by a specific event.  · People with SHENG often worry excessively about many things in their lives, such as their health and family.  · SHENG may cause symptoms such as restlessness, trouble concentrating, sleep problems, frequent sweating, nausea, diarrhea, headaches, and trembling or muscle twitching.  · A mental health specialist can help determine which treatment is best for you. Some people see improvement with one type of therapy. However, other people require a combination of therapies.  This information is not intended to replace advice given to you by your health care provider. Make sure you discuss any questions you have with your health care provider.  Document Revised: 10/07/2020 Document Reviewed: 10/07/2020  [employer] Patient Education © 2021 [employer]    Suspect Essential HTN.Good BP control is encouraged with Goal BP based on JNC 8 guidelines 2014 <140/90 for patients with known cardiac disease and diabetes. (MARQUIS. 2014:322 (5):507-520. doi:10.1001/marquis.2013.73600): general population <60 yr old goal BP <140/90 and for those >60 <150/90.  For patients of all ages with Diabetes, CKD, Known CAD <140/90. Recommended to the patient to obtain electronic home BP machine with upper arm blood pressure cuff and to check regularly as instructed.  Keep BP log and bring to subsequent visits. Stable, at goal.  a. LABS: routine for hypertension recommended and ordered if necessary.  b. Recommend if you do not have a home BP machine to obtain an electronic machine with arm blood pressure cuff.      c. Monitor BP over the next week and keep log to bring back to office. Discussed medication therapy however pt wants to try to control with diet exercise. .  Your provider  has recommended self-monitoring of your blood pressure.  If you do not have a blood pressure cuff you may purchase one from the local pharmacy.  You may ask the pharmacist which brand and model  they recommend.  Obtain your blood pressure measurement at least 2x per week.  You should also check your blood pressure if you experience any symptoms of blurred visit, dizziness or headache.  Please record all blood pressure measurements and bring them to next office visit.  If you have any questions about the accuracy of your blood pressure machine please bring it in to the office and our staff will be happy to check accuracy.   d. Encouraged to eat a low sodium heart healthy diet  e. Offered handout on HTN educational topics.  These were provided if patient requested these today.  f. MEDS: as listed in today's visit.  g. Risks/benefits of current and new medications discussed with the patient and or family today.  The patient/family are aware and accept that if there any side effects they should call or return to clinic as soon as possible.  Appropriate F/U discussed for topics addressed today. All questions were answered to the  satisfactory state of patient/family.  Should symptoms fail to improve or worsen they agree to call or return to clinic or to go to the ER. Education handouts were offered on any new Rx if requested.  Discussed the importance of following up with any needed screening tests/labs/specialist appointments and any requested follow-up recommended by me today.  Importance of maintaining follow-up discussed and patient accepts that missed appointments can delay diagnosis and potentially lead to worsening of conditions.

## 2021-06-21 ENCOUNTER — TELEPHONE (OUTPATIENT)
Dept: FAMILY MEDICINE CLINIC | Facility: CLINIC | Age: 71
End: 2021-06-21

## 2021-06-21 NOTE — TELEPHONE ENCOUNTER
Caller: Shanell Coffman    Relationship: Self    Best call back number: [phone]     What is the best time to reach you: ANYTIME    Who are you requesting to speak with (clinical staff, provider,  specific staff member): CLINICAL STAFF     What was the call regarding: PATIENT WOULD LIKE A CALL BACK TO DISCUSS HER BLOOD PRESSURE. PATIENT WOULD NOT SAY ANYTHING FURTHER WHEN ASKED.

## 2021-06-22 NOTE — TELEPHONE ENCOUNTER
Called and spoke to patient and she advised that she spoke with Dr. Jacobs last night and was advised what to do about her hypertension. Patient stated that Dr. Jacobs was going to go over patient's chart today and see what changes need to be made. Patient stated that if Dr. Jacobs wants her to continue to take Clonidine then she will need a new script sent to [facility].  This medication has been pended to provider.

## 2021-06-23 RX ORDER — CLONIDINE HYDROCHLORIDE 0.1 MG/1
0.1 TABLET ORAL 2 TIMES DAILY
Qty: 120 TABLET | Refills: 1 | Status: SHIPPED | OUTPATIENT
Start: 2021-06-23 | End: 2021-08-16

## 2021-08-16 RX ORDER — CLONIDINE HYDROCHLORIDE 0.1 MG/1
TABLET ORAL
Qty: 180 TABLET | Refills: 1 | Status: SHIPPED | OUTPATIENT
Start: 2021-08-16 | End: 2022-03-15

## 2021-08-27 DIAGNOSIS — M62.838 NECK MUSCLE SPASM: ICD-10-CM

## 2021-08-30 ENCOUNTER — TELEPHONE (OUTPATIENT)
Dept: FAMILY MEDICINE CLINIC | Facility: CLINIC | Age: 71
End: 2021-08-30

## 2021-08-30 RX ORDER — TIZANIDINE 4 MG/1
4 TABLET ORAL EVERY 8 HOURS PRN
Qty: 270 TABLET | Refills: 1 | Status: SHIPPED | OUTPATIENT
Start: 2021-08-30 | End: 2022-03-11

## 2021-08-30 NOTE — TELEPHONE ENCOUNTER
"Pt called, stated that she had a recent fall and feels as if her left leg is giving out. Pt does not want to go to ER and stated that she will not go, she only wants to see Dr. Jacobs. No available appt's with Dr. Jacobs, offered telehealth with Mrs. Antonette Diggs on Friday, she did not schedule.   She was not comfortable with this information being shared with others, and said she would \"absolutely call tomorrow if she is no better.\"  "

## 2021-09-01 DIAGNOSIS — K21.9 GASTROESOPHAGEAL REFLUX DISEASE WITHOUT ESOPHAGITIS: ICD-10-CM

## 2021-09-02 RX ORDER — PANTOPRAZOLE SODIUM 40 MG/1
TABLET, DELAYED RELEASE ORAL
Qty: 90 TABLET | Refills: 1 | Status: SHIPPED | OUTPATIENT
Start: 2021-09-02 | End: 2022-03-07

## 2021-09-15 ENCOUNTER — OFFICE VISIT (OUTPATIENT)
Dept: FAMILY MEDICINE CLINIC | Facility: CLINIC | Age: 71
End: 2021-09-15

## 2021-09-15 VITALS
BODY MASS INDEX: 32.75 KG/M2 | DIASTOLIC BLOOD PRESSURE: 82 MMHG | HEIGHT: 60 IN | TEMPERATURE: 98.2 F | SYSTOLIC BLOOD PRESSURE: 174 MMHG | OXYGEN SATURATION: 90 % | HEART RATE: 59 BPM | WEIGHT: 166.8 LBS

## 2021-09-15 DIAGNOSIS — R53.83 FATIGUE, UNSPECIFIED TYPE: ICD-10-CM

## 2021-09-15 DIAGNOSIS — Z79.899 LONG-TERM USE OF HIGH-RISK MEDICATION: ICD-10-CM

## 2021-09-15 DIAGNOSIS — M62.830 LUMBAR PARASPINAL MUSCLE SPASM: ICD-10-CM

## 2021-09-15 DIAGNOSIS — J44.1 COPD WITH EXACERBATION (HCC): ICD-10-CM

## 2021-09-15 DIAGNOSIS — M47.819 ARTHRITIS, LOW BACK: ICD-10-CM

## 2021-09-15 DIAGNOSIS — I10 ESSENTIAL HYPERTENSION: Primary | ICD-10-CM

## 2021-09-15 DIAGNOSIS — F41.9 ANXIETY: ICD-10-CM

## 2021-09-15 PROCEDURE — 99214 OFFICE O/P EST MOD 30 MIN: CPT | Performed by: FAMILY MEDICINE

## 2021-09-15 RX ORDER — OXYCODONE AND ACETAMINOPHEN 7.5; 325 MG/1; MG/1
1 TABLET ORAL EVERY 8 HOURS PRN
Qty: 30 TABLET | Refills: 0 | Status: SHIPPED | OUTPATIENT
Start: 2021-09-15

## 2021-09-15 RX ORDER — ALPRAZOLAM 1 MG/1
1 TABLET ORAL 3 TIMES DAILY PRN
Qty: 90 TABLET | Refills: 2 | Status: SHIPPED | OUTPATIENT
Start: 2021-09-15 | End: 2021-12-10 | Stop reason: SDUPTHER

## 2021-09-15 NOTE — PROGRESS NOTES
OFFICE VISIT NOTE:    Shanell Coffman is a 70 y.o. female who presents today for Anxiety (3 month OV Xanax), Hypertension (3 month OV), Back Pain (3 month OV Percocet), and Fall (patient has been falling more frequenrly in the bath, she is asking for a seat for the bath tub).     Patient presents for follow-up of long term use of high risk medication (narcotics, sedatives, stimulants or other controlled medications). The patient has read and signed the [facility] Controlled Substance Contract - copy in chart and updated annually. A recent Jethro was reviewed and is present in the chart, updated annually and as needed. UDS has been reviewed and is up to date, and performed at least annually and PRN discretion of provider. No aberrant behavioral issues are noted, and no significant side effects/complications are present. The patient is aware of the potential for addiction and dependence of these medications and accepts responsibility for proper med use. Patient is aware of the PRN use of these medications (unless otherwise prescribed), and not to be used routinely.     Anxiety  Presents for follow-up visit. Symptoms include nervous/anxious behavior, palpitations and shortness of breath. Patient reports no chest pain, excessive worry, obsessions or suicidal ideas. Symptoms occur most days. The severity of symptoms is causing significant distress. The quality of sleep is fair. Nighttime awakenings: occasional.     Compliance with medications is %.   Hypertension  This is a chronic problem. The current episode started more than 1 year ago. The problem is unchanged. The problem is uncontrolled. Associated symptoms include anxiety, palpitations and shortness of breath. Pertinent negatives include no chest pain, headaches, orthopnea, peripheral edema or PND. There are no associated agents to hypertension. The current treatment provides significant improvement. There are no compliance problems.    Back Pain  This is  a chronic problem. The current episode started more than 1 year ago. The problem occurs constantly. The problem is unchanged. The pain is present in the lumbar spine. The quality of the pain is described as cramping and aching. The pain does not radiate. The pain is severe. The pain is worse during the day. The symptoms are aggravated by bending and twisting. Stiffness is present in the morning. Pertinent negatives include no abdominal pain, chest pain, fever, headaches or weight loss. She has tried analgesics, bed rest and home exercises for the symptoms. The treatment provided significant relief.        Past medical/surgical history, Family history, Social history, Allergies and Medications have been reviewed with the patient today and are updated in [facility] EMR. See below.  Past Medical History:   Diagnosis Date   • Anxiety    • Chronic pain    • COPD (chronic obstructive pulmonary disease) (CMS/HCC)    • GERD (gastroesophageal reflux disease)    • Hyperlipidemia      Past Surgical History:   Procedure Laterality Date   • APPENDECTOMY     • CHOLECYSTECTOMY     • COLON SURGERY     • TUBAL ABDOMINAL LIGATION       Family History   Problem Relation Age of Onset   • Heart disease Mother    • Hyperlipidemia Mother    • Hypertension Mother    • Cancer Father    • Mental illness Father    • Diabetes Maternal Grandmother    • Heart attack Maternal Grandfather    • Obesity Paternal Grandmother    • Stroke Paternal Grandfather      Social History     Tobacco Use   • Smoking status: Never Smoker   • Smokeless tobacco: Never Used   Substance Use Topics   • Alcohol use: No   • Drug use: No       ALLERGIES:  Ceclor [cefaclor], Keflex [cephalexin], Stadol [butorphanol], Sulfa antibiotics, and Seroquel [quetiapine]    CURRENT MEDS:    Current Outpatient Medications:   •  albuterol (PROVENTIL) (2.5 MG/3ML) 0.083% nebulizer solution, USE 3 ML PER NEBULIZER 4 TIMES A DAY, Disp: 375 mL, Rfl: 0  •  ALPRAZolam (Xanax) 1 MG tablet, Take 1  tablet by mouth 3 (Three) Times a Day As Needed for Anxiety., Disp: 90 tablet, Rfl: 2  •  amLODIPine (NORVASC) 10 MG tablet, Take 1 tablet by mouth Daily., Disp: 90 tablet, Rfl: 1  •  Calcium Carbonate-Vitamin D (OYSTER SHELL CALCIUM 500 + D) 500-125 MG-UNIT tablet, TAKE 1 TABLET BY MOUTH WITH FOOD, Disp: 90 each, Rfl: 2  •  cloNIDine (CATAPRES) 0.1 MG tablet, TAKE 1 TABLET BY MOUTH TWICE A DAY, Disp: 180 tablet, Rfl: 1  •  dicyclomine (BENTYL) 10 MG capsule, Take 1 capsule by mouth 4 (Four) Times a Day., Disp: 360 capsule, Rfl: 1  •  EPINEPHrine (EPIPEN) 0.3 MG/0.3ML solution auto-injector injection, INJECT 0.3 ML INTO THE APPROPRIATE MUSCLE AS DIRECTED BY PRESCRIBER 1 (ONE) TIME FOR 1 DOSE., Disp: , Rfl: 1  •  escitalopram (LEXAPRO) 20 MG tablet, Take 1 tablet by mouth every night at bedtime., Disp: 90 tablet, Rfl: 1  •  furosemide (LASIX) 40 MG tablet, Take 1 tablet by mouth Daily., Disp: 90 tablet, Rfl: 1  •  hydrALAZINE (APRESOLINE) 25 MG tablet, Take 1 tablet by mouth 3 (Three) Times a Day., Disp: 270 tablet, Rfl: 1  •  ketoconazole (NIZORAL) 2 % cream, Apply  topically to the appropriate area as directed Daily., Disp: 30 g, Rfl: 1  •  levocetirizine (XYZAL) 5 MG tablet, Take 1 tablet by mouth Every Evening., Disp: 90 tablet, Rfl: 1  •  losartan (COZAAR) 100 MG tablet, Take 1 tablet by mouth Daily., Disp: 90 tablet, Rfl: 1  •  metoclopramide (REGLAN) 10 MG tablet, Take 1 tablet by mouth 4 (Four) Times a Day Before Meals & at Bedtime., Disp: 360 tablet, Rfl: 1  •  metoprolol succinate XL (TOPROL-XL) 100 MG 24 hr tablet, Take 1 tablet by mouth Daily., Disp: 90 tablet, Rfl: 1  •  multivitamins-minerals (PRESERVISION AREDS 2) capsule capsule, Take 1 capsule by mouth 2 (Two) Times a Day., Disp: 180 capsule, Rfl: 1  •  nabumetone (RELAFEN) 500 MG tablet, Take 1 tablet by mouth 2 (Two) Times a Day As Needed for Moderate Pain ., Disp: 180 tablet, Rfl: 1  •  nystatin (MYCOSTATIN) 221472 UNIT/GM powder, Apply  topically  to the appropriate area as directed 3 (Three) Times a Day., Disp: 60 g, Rfl: 3  •  O2 (OXYGEN), Inhale 2 L/min 1 (One) Time., Disp: , Rfl:   •  ondansetron ODT (ZOFRAN-ODT) 4 MG disintegrating tablet, Place 1 tablet on the tongue Every 8 (Eight) Hours As Needed for Nausea or Vomiting., Disp: 30 tablet, Rfl: 1  •  oxyCODONE-acetaminophen (PERCOCET) 7.5-325 MG per tablet, Take 1 tablet by mouth Every 8 (Eight) Hours As Needed for Severe Pain ., Disp: 30 tablet, Rfl: 0  •  pantoprazole (PROTONIX) 40 MG EC tablet, TAKE 1 TABLET BY MOUTH EVERY DAY, Disp: 90 tablet, Rfl: 1  •  potassium chloride ER (K-TAB) 20 MEQ tablet controlled-release ER tablet, Take 1 tablet by mouth Daily., Disp: 90 tablet, Rfl: 1  •  promethazine (PHENERGAN) 25 MG tablet, Take 1 tablet by mouth Every 8 (Eight) Hours As Needed for Nausea or Vomiting., Disp: 30 tablet, Rfl: 0  •  SPIRIVA RESPIMAT 2.5 MCG/ACT aerosol solution inhaler, INHALE 2 PUFFS BY MOUTH DAILY, Disp: 4 g, Rfl: 5  •  terbinafine (lamiSIL) 250 MG tablet, Take 1 tablet by mouth Daily., Disp: 14 tablet, Rfl: 0  •  tiZANidine (ZANAFLEX) 4 MG tablet, TAKE 1 TABLET BY MOUTH EVERY 8 (EIGHT) HOURS AS NEEDED FOR MUSCLE SPASMS., Disp: 270 tablet, Rfl: 1  •  traZODone (DESYREL) 150 MG tablet, Take 1 tablet by mouth every night at bedtime., Disp: 90 tablet, Rfl: 1    REVIEW OF SYSTEMS:  Review of Systems   Constitutional: Negative for activity change, fatigue, fever, unexpected weight gain and unexpected weight loss.   Respiratory: Positive for shortness of breath.    Cardiovascular: Positive for palpitations. Negative for chest pain, orthopnea and PND.   Gastrointestinal: Negative for abdominal pain.   Genitourinary: Negative for difficulty urinating.   Musculoskeletal: Positive for back pain.   Skin: Negative for rash.   Neurological: Negative for syncope and headache.   Psychiatric/Behavioral: Negative for suicidal ideas. The patient is nervous/anxious.        PHYSICAL EXAMINATION:  Vital  "Signs:  /82 (BP Location: Right arm, Patient Position: Sitting, Cuff Size: Adult)   Pulse 59   Temp 98.2 °F (36.8 °C)   Ht 152.4 cm (60\") Comment: pt reported  Wt 75.7 kg (166 lb 12.8 oz)   LMP  (LMP Unknown)   SpO2 90%   BMI 32.58 kg/m²   Vitals:    09/15/21 0956   Patient Position: Sitting       Physical Exam  Vitals and nursing note reviewed.   Constitutional:       General: She is not in acute distress.     Appearance: She is well-developed.   HENT:      Head: Normocephalic and atraumatic.      Nose: Nose normal.      Mouth/Throat:      Mouth: Mucous membranes are moist.      Pharynx: Oropharynx is clear.   Eyes:      Extraocular Movements: Extraocular movements intact.      Pupils: Pupils are equal, round, and reactive to light.   Neck:      Vascular: No JVD.   Cardiovascular:      Rate and Rhythm: Normal rate and regular rhythm.      Pulses: Normal pulses.      Heart sounds: Normal heart sounds.   Pulmonary:      Effort: Pulmonary effort is normal. No respiratory distress.      Breath sounds: Wheezing and rhonchi present.   Abdominal:      General: Bowel sounds are normal. There is no distension.      Palpations: Abdomen is soft.      Tenderness: There is no abdominal tenderness.   Musculoskeletal:         General: Normal range of motion.      Cervical back: Normal range of motion and neck supple.      Right lower leg: No edema.      Left lower leg: No edema.   Skin:     General: Skin is warm and dry.      Capillary Refill: Capillary refill takes less than 2 seconds.      Findings: No rash.   Neurological:      General: No focal deficit present.      Mental Status: She is alert and oriented to person, place, and time.      Cranial Nerves: No cranial nerve deficit.   Psychiatric:         Mood and Affect: Mood normal.         Behavior: Behavior normal.         Procedures    ASSESSMENT/ PLAN:  Problem List Items Addressed This Visit        Cardiac and Vasculature    Essential hypertension - Primary "    Relevant Orders    Ambulatory Referral to Home Health (Completed)       Mental Health    Anxiety    Relevant Medications    ALPRAZolam (Xanax) 1 MG tablet       Musculoskeletal and Injuries    Lumbar paraspinal muscle spasm       Neuro    Arthritis, low back    Relevant Medications    oxyCODONE-acetaminophen (PERCOCET) 7.5-325 MG per tablet    Other Relevant Orders    Miscellaneous DME    Miscellaneous DME    Ambulatory Referral to Home Health (Completed)      Other Visit Diagnoses     COPD with exacerbation (CMS/[facility])        Relevant Orders    Miscellaneous DME    Miscellaneous DME    Ambulatory Referral to Home Health (Completed)    Fatigue, unspecified type        Long-term use of high-risk medication        Relevant Orders    Compliance Drug Analysis, Ur - Urine, Clean Catch            Specific Patient Instructions:  MEDICATION Instructions: Encouraged patient to continue routine medicines as prescribed and maintain compliance. Patient instructed to report any adverse side effects or reactions to medicines promptly to the office. Patient instructed to make us aware of any OTC or herbal meds or supplement use.    DIET Recommendations: Patient instructed and provided information on the following nutrition and diet recommendations: Calorie restriction for weight reduction and maintenance. Necessity for adequate daily intake of fluids/water. Also, diet information provided in AVS for specifics.    EXERCISE Instructions: Discussed with patient the need for routine aerobic activity for cardiovascular fitness, 3 times a week for about 30 minutes. Daily exercise for increased fitness and weight reduction goals.    SMOKING Recommendations: Counseled patient and encouraged them on smoking and tobacco cessation if applicable. Discussed the benefits to all body systems with smoking/tobacco cessation, including decreased cardiac/lung/stroke/cancer risk. Encouraged no vaping use.    HEALTH MAINTENANCE:  Counseling provided  to patient/family about routine health maintenance and ANNUAL physicals/labs. Counseling on recommended Vaccinations appropriate for age needed.        Patient's Body mass index is 32.58 kg/m². indicating that she is obese (BMI >30). Obesity-related health conditions include the following: hypertension and osteoarthritis. Obesity is unchanged. BMI is is above average; BMI management plan is completed. We discussed portion control and increasing exercise..      Medications or Orders placed this visit:  Orders Placed This Encounter   Procedures   • Miscellaneous DME     Order Specific Question:   Type of DME     Answer:   shower  bar     Order Specific Question:   Length of Need (99 Months = Lifetime)     Answer:   99 Months = Lifetime   • Miscellaneous DME     Order Specific Question:   Type of DME     Answer:   shower chair     Order Specific Question:   Length of Need (99 Months = Lifetime)     Answer:   99 Months = Lifetime   • Compliance Drug Analysis, Ur - Urine, Clean Catch   • Ambulatory Referral to Home Health     Referral Priority:   Routine     Referral Type:   Home Health     Referral Reason:   Specialty Services Required     Requested Specialty:   Home Health Services     Number of Visits Requested:   999       Medications DISCONTINUED this visit:  Medications Discontinued During This Encounter   Medication Reason   • oxyCODONE-acetaminophen (PERCOCET) 7.5-325 MG per tablet Reorder   • ALPRAZolam (Xanax) 1 MG tablet Reorder       FOLLOW-UP:  Return in about 3 months (around 12/15/2021) for Recheck, Next scheduled follow up.    I discussed the patients findings and my recommendations with patient.  An After Visit Summary (AVS) was printed and given to the patient at discharge.        Reji Jacobs MD, FAAFP  9/15/2021

## 2021-09-20 ENCOUNTER — TELEPHONE (OUTPATIENT)
Dept: FAMILY MEDICINE CLINIC | Facility: CLINIC | Age: 71
End: 2021-09-20

## 2021-09-22 DIAGNOSIS — F32.9 REACTIVE DEPRESSION: ICD-10-CM

## 2021-09-23 RX ORDER — ESCITALOPRAM OXALATE 20 MG/1
TABLET ORAL
Qty: 90 TABLET | Refills: 1 | Status: SHIPPED | OUTPATIENT
Start: 2021-09-23

## 2021-09-24 DIAGNOSIS — J20.9 ACUTE BRONCHITIS, UNSPECIFIED ORGANISM: Primary | ICD-10-CM

## 2021-09-24 LAB — DRUGS UR: NORMAL

## 2021-09-24 RX ORDER — DEXTROMETHORPHAN HYDROBROMIDE AND PROMETHAZINE HYDROCHLORIDE 15; 6.25 MG/5ML; MG/5ML
5 SYRUP ORAL EVERY 6 HOURS PRN
Qty: 240 ML | Refills: 0 | Status: SHIPPED | OUTPATIENT
Start: 2021-09-24 | End: 2021-11-05

## 2021-09-24 RX ORDER — DOXYCYCLINE 100 MG/1
100 CAPSULE ORAL 2 TIMES DAILY
Qty: 14 CAPSULE | Refills: 0 | Status: SHIPPED | OUTPATIENT
Start: 2021-09-24 | End: 2021-10-01

## 2021-09-27 ENCOUNTER — TELEPHONE (OUTPATIENT)
Dept: FAMILY MEDICINE CLINIC | Facility: CLINIC | Age: 71
End: 2021-09-27

## 2021-10-22 ENCOUNTER — TELEPHONE (OUTPATIENT)
Dept: FAMILY MEDICINE CLINIC | Facility: CLINIC | Age: 71
End: 2021-10-22

## 2021-10-22 NOTE — TELEPHONE ENCOUNTER
Spoke with Pt, she wanted to change lab appt time only. I have moved lab appt to preferred time of 10:00.

## 2021-10-22 NOTE — TELEPHONE ENCOUNTER
Caller: Shanell Coffman    Relationship to patient: Self    Best call back number: [phone]    Chief complaint: 8:15     Type of visit: LABS      If rescheduling, when is the original appointment: SAME DAY LATER TIME FOR LABS

## 2021-10-25 ENCOUNTER — CLINICAL SUPPORT (OUTPATIENT)
Dept: FAMILY MEDICINE CLINIC | Facility: CLINIC | Age: 71
End: 2021-10-25

## 2021-10-25 DIAGNOSIS — R53.83 FATIGUE, UNSPECIFIED TYPE: ICD-10-CM

## 2021-10-25 DIAGNOSIS — Z00.00 ENCOUNTER FOR MEDICARE ANNUAL WELLNESS EXAM: ICD-10-CM

## 2021-10-25 DIAGNOSIS — I10 ESSENTIAL HYPERTENSION: Primary | ICD-10-CM

## 2021-10-26 LAB
ALBUMIN SERPL-MCNC: 4 G/DL (ref 3.8–4.8)
ALBUMIN/GLOB SERPL: 1.3 {RATIO} (ref 1.2–2.2)
ALP SERPL-CCNC: 77 IU/L (ref 44–121)
ALT SERPL-CCNC: 10 IU/L (ref 0–32)
AST SERPL-CCNC: 17 IU/L (ref 0–40)
BASOPHILS # BLD AUTO: 0 X10E3/UL (ref 0–0.2)
BASOPHILS NFR BLD AUTO: 1 %
BILIRUB SERPL-MCNC: <0.2 MG/DL (ref 0–1.2)
BUN SERPL-MCNC: 17 MG/DL (ref 8–27)
BUN/CREAT SERPL: 17 (ref 12–28)
CALCIUM SERPL-MCNC: 9.5 MG/DL (ref 8.7–10.3)
CHLORIDE SERPL-SCNC: 100 MMOL/L (ref 96–106)
CHOLEST SERPL-MCNC: 200 MG/DL (ref 100–199)
CO2 SERPL-SCNC: 27 MMOL/L (ref 20–29)
CREAT SERPL-MCNC: 0.98 MG/DL (ref 0.57–1)
EOSINOPHIL # BLD AUTO: 0.3 X10E3/UL (ref 0–0.4)
EOSINOPHIL NFR BLD AUTO: 5 %
ERYTHROCYTE [DISTWIDTH] IN BLOOD BY AUTOMATED COUNT: 12.8 % (ref 11.7–15.4)
GLOBULIN SER CALC-MCNC: 3.2 G/DL (ref 1.5–4.5)
GLUCOSE SERPL-MCNC: 82 MG/DL (ref 65–99)
HCT VFR BLD AUTO: 39.1 % (ref 34–46.6)
HDLC SERPL-MCNC: 53 MG/DL
HGB BLD-MCNC: 12.5 G/DL (ref 11.1–15.9)
IMM GRANULOCYTES # BLD AUTO: 0 X10E3/UL (ref 0–0.1)
IMM GRANULOCYTES NFR BLD AUTO: 0 %
LDLC SERPL CALC-MCNC: 120 MG/DL (ref 0–99)
LYMPHOCYTES # BLD AUTO: 2.2 X10E3/UL (ref 0.7–3.1)
LYMPHOCYTES NFR BLD AUTO: 37 %
MCH RBC QN AUTO: 31.6 PG (ref 26.6–33)
MCHC RBC AUTO-ENTMCNC: 32 G/DL (ref 31.5–35.7)
MCV RBC AUTO: 99 FL (ref 79–97)
MONOCYTES # BLD AUTO: 0.7 X10E3/UL (ref 0.1–0.9)
MONOCYTES NFR BLD AUTO: 11 %
NEUTROPHILS # BLD AUTO: 2.8 X10E3/UL (ref 1.4–7)
NEUTROPHILS NFR BLD AUTO: 46 %
PLATELET # BLD AUTO: 207 X10E3/UL (ref 150–450)
POTASSIUM SERPL-SCNC: 4.3 MMOL/L (ref 3.5–5.2)
PROT SERPL-MCNC: 7.2 G/DL (ref 6–8.5)
RBC # BLD AUTO: 3.96 X10E6/UL (ref 3.77–5.28)
SODIUM SERPL-SCNC: 144 MMOL/L (ref 134–144)
T4 SERPL-MCNC: 8.9 UG/DL (ref 4.5–12)
TRIGL SERPL-MCNC: 155 MG/DL (ref 0–149)
TSH SERPL DL<=0.005 MIU/L-ACNC: 3.47 UIU/ML (ref 0.45–4.5)
VLDLC SERPL CALC-MCNC: 27 MG/DL (ref 5–40)
WBC # BLD AUTO: 6 X10E3/UL (ref 3.4–10.8)

## 2021-10-27 ENCOUNTER — OFFICE VISIT (OUTPATIENT)
Dept: FAMILY MEDICINE CLINIC | Facility: CLINIC | Age: 71
End: 2021-10-27

## 2021-10-27 VITALS
SYSTOLIC BLOOD PRESSURE: 161 MMHG | DIASTOLIC BLOOD PRESSURE: 73 MMHG | WEIGHT: 170 LBS | HEIGHT: 60 IN | BODY MASS INDEX: 33.38 KG/M2 | HEART RATE: 52 BPM | TEMPERATURE: 97.8 F | OXYGEN SATURATION: 97 %

## 2021-10-27 DIAGNOSIS — F32.9 REACTIVE DEPRESSION: ICD-10-CM

## 2021-10-27 DIAGNOSIS — F41.9 ANXIETY: ICD-10-CM

## 2021-10-27 DIAGNOSIS — K21.9 GASTROESOPHAGEAL REFLUX DISEASE WITHOUT ESOPHAGITIS: ICD-10-CM

## 2021-10-27 DIAGNOSIS — I10 ESSENTIAL HYPERTENSION: ICD-10-CM

## 2021-10-27 DIAGNOSIS — M62.838 NECK MUSCLE SPASM: ICD-10-CM

## 2021-10-27 DIAGNOSIS — J43.1 PANLOBULAR EMPHYSEMA (HCC): ICD-10-CM

## 2021-10-27 DIAGNOSIS — Z00.00 MEDICARE ANNUAL WELLNESS VISIT, SUBSEQUENT: Primary | ICD-10-CM

## 2021-10-27 DIAGNOSIS — M62.830 LUMBAR PARASPINAL MUSCLE SPASM: ICD-10-CM

## 2021-10-27 DIAGNOSIS — Z23 FLU VACCINE NEED: ICD-10-CM

## 2021-10-27 DIAGNOSIS — J44.1 COPD WITH EXACERBATION (HCC): ICD-10-CM

## 2021-10-27 PROCEDURE — 1170F FXNL STATUS ASSESSED: CPT | Performed by: FAMILY MEDICINE

## 2021-10-27 PROCEDURE — 1160F RVW MEDS BY RX/DR IN RCRD: CPT | Performed by: FAMILY MEDICINE

## 2021-10-27 PROCEDURE — 90662 IIV NO PRSV INCREASED AG IM: CPT | Performed by: FAMILY MEDICINE

## 2021-10-27 PROCEDURE — G0008 ADMIN INFLUENZA VIRUS VAC: HCPCS | Performed by: FAMILY MEDICINE

## 2021-10-27 PROCEDURE — 1125F AMNT PAIN NOTED PAIN PRSNT: CPT | Performed by: FAMILY MEDICINE

## 2021-10-27 PROCEDURE — G0439 PPPS, SUBSEQ VISIT: HCPCS | Performed by: FAMILY MEDICINE

## 2021-10-27 NOTE — PROGRESS NOTES
The ABCs of the Annual Wellness Visit  Subsequent Medicare Wellness Visit    Chief Complaint   Patient presents with   • Medicare Wellness-subsequent      Subjective    History of Present Illness:  Shanell Coffman is a 70 y.o. female who presents for a Subsequent Medicare Wellness Visit.    Labs reviewed - all great except . Info given for diet changes.     The following portions of the patient's history were reviewed and   updated as appropriate: allergies, current medications, past family history, past medical history, past social history, past surgical history and problem list.    Compared to one year ago, the patient feels her physical   health is the same.    Compared to one year ago, the patient feels her mental   health is the same.    Recent Hospitalizations:  She was not admitted to the hospital during the last year.       Current Medical Providers:  Patient Care Team:  Reji Jacobs MD as PCP - General (Family Medicine)    Outpatient Medications Prior to Visit   Medication Sig Dispense Refill   • albuterol (PROVENTIL) (2.5 MG/3ML) 0.083% nebulizer solution USE 3 ML PER NEBULIZER 4 TIMES A  mL 0   • ALPRAZolam (Xanax) 1 MG tablet Take 1 tablet by mouth 3 (Three) Times a Day As Needed for Anxiety. 90 tablet 2   • amLODIPine (NORVASC) 10 MG tablet Take 1 tablet by mouth Daily. 90 tablet 1   • BIOTIN PO Take  by mouth Daily.     • cloNIDine (CATAPRES) 0.1 MG tablet TAKE 1 TABLET BY MOUTH TWICE A  tablet 1   • dicyclomine (BENTYL) 10 MG capsule Take 1 capsule by mouth 4 (Four) Times a Day. 360 capsule 1   • EPINEPHrine (EPIPEN) 0.3 MG/0.3ML solution auto-injector injection INJECT 0.3 ML INTO THE APPROPRIATE MUSCLE AS DIRECTED BY PRESCRIBER 1 (ONE) TIME FOR 1 DOSE.  1   • escitalopram (LEXAPRO) 20 MG tablet TAKE 1 TABLET BY MOUTH EVERYDAY AT BEDTIME 90 tablet 1   • furosemide (LASIX) 40 MG tablet Take 1 tablet by mouth Daily. 90 tablet 1   • hydrALAZINE (APRESOLINE) 25 MG tablet Take 1  tablet by mouth 3 (Three) Times a Day. 270 tablet 1   • ketoconazole (NIZORAL) 2 % cream Apply  topically to the appropriate area as directed Daily. 30 g 1   • levocetirizine (XYZAL) 5 MG tablet Take 1 tablet by mouth Every Evening. 90 tablet 1   • losartan (COZAAR) 100 MG tablet Take 1 tablet by mouth Daily. 90 tablet 1   • metoclopramide (REGLAN) 10 MG tablet Take 1 tablet by mouth 4 (Four) Times a Day Before Meals & at Bedtime. 360 tablet 1   • metoprolol succinate XL (TOPROL-XL) 100 MG 24 hr tablet Take 1 tablet by mouth Daily. 90 tablet 1   • multivitamins-minerals (PRESERVISION AREDS 2) capsule capsule Take 1 capsule by mouth 2 (Two) Times a Day. 180 capsule 1   • nabumetone (RELAFEN) 500 MG tablet Take 1 tablet by mouth 2 (Two) Times a Day As Needed for Moderate Pain . 180 tablet 1   • nystatin (MYCOSTATIN) 047364 UNIT/GM powder Apply  topically to the appropriate area as directed 3 (Three) Times a Day. 60 g 3   • O2 (OXYGEN) Inhale 2 L/min 1 (One) Time.     • ondansetron ODT (ZOFRAN-ODT) 4 MG disintegrating tablet Place 1 tablet on the tongue Every 8 (Eight) Hours As Needed for Nausea or Vomiting. 30 tablet 1   • oxyCODONE-acetaminophen (PERCOCET) 7.5-325 MG per tablet Take 1 tablet by mouth Every 8 (Eight) Hours As Needed for Severe Pain . 30 tablet 0   • pantoprazole (PROTONIX) 40 MG EC tablet TAKE 1 TABLET BY MOUTH EVERY DAY 90 tablet 1   • potassium chloride ER (K-TAB) 20 MEQ tablet controlled-release ER tablet Take 1 tablet by mouth Daily. 90 tablet 1   • promethazine-dextromethorphan (PROMETHAZINE-DM) 6.25-15 MG/5ML syrup Take 5 mL by mouth Every 6 (Six) Hours As Needed for Cough. 240 mL 0   • SPIRIVA RESPIMAT 2.5 MCG/ACT aerosol solution inhaler INHALE 2 PUFFS BY MOUTH DAILY 4 g 5   • terbinafine (lamiSIL) 250 MG tablet Take 1 tablet by mouth Daily. 14 tablet 0   • tiZANidine (ZANAFLEX) 4 MG tablet TAKE 1 TABLET BY MOUTH EVERY 8 (EIGHT) HOURS AS NEEDED FOR MUSCLE SPASMS. 270 tablet 1   • traZODone  (DESYREL) 150 MG tablet Take 1 tablet by mouth every night at bedtime. 90 tablet 1   • promethazine (PHENERGAN) 25 MG tablet Take 1 tablet by mouth Every 8 (Eight) Hours As Needed for Nausea or Vomiting. 30 tablet 0   • Calcium Carbonate-Vitamin D (OYSTER SHELL CALCIUM 500 + D) 500-125 MG-UNIT tablet TAKE 1 TABLET BY MOUTH WITH FOOD 90 each 2     No facility-administered medications prior to visit.       Opioid medication/s are on active medication list.  and I have evaluated her active treatment plan and pain score trends (see table).  Vitals:    10/27/21 1109   PainSc:   6   PainLoc: Back     I have reviewed the chart for potential of high risk medication and harmful drug interactions in the elderly.            Aspirin is not on active medication list.  Aspirin use is not indicated based on review of current medical condition/s. Risk of harm outweighs potential benefits.  .    Patient Active Problem List   Diagnosis   • Essential hypertension   • Mixed simple and mucopurulent chronic bronchitis (HCC)   • Anxiety   • Reactive depression   • Arthritis, low back   • Tinea corporis   • Lumbar paraspinal muscle spasm   • Onychomycosis   • Panlobular emphysema (HCC)     Advance Care Planning  Advance Directive is on file.  ACP discussion was held with the patient during this visit. Patient has an advance directive in EMR which is still valid.     Review of Systems   Constitutional: Negative for activity change, appetite change, fatigue and fever.   Respiratory: Negative for cough and shortness of breath.    Cardiovascular: Negative for chest pain.   Gastrointestinal: Negative for abdominal pain.   Genitourinary: Negative for difficulty urinating.   Skin: Negative for rash.   Neurological: Negative for syncope.        Objective    Vitals:    10/27/21 1109   BP: 161/73   BP Location: Right arm   Patient Position: Sitting   Cuff Size: Large Adult   Pulse: 52   Temp: 97.8 °F (36.6 °C)   TempSrc: Infrared   SpO2:  "97%  Comment: Currently on 2 liters of O2   Weight: 77.1 kg (170 lb)   Height: 152.4 cm (60\")   PainSc:   6   PainLoc: Back     BMI Readings from Last 1 Encounters:   10/27/21 33.20 kg/m²   BMI is above normal parameters. Recommendations include: exercise counseling and nutrition counseling    Does the patient have evidence of cognitive impairment? No    Physical Exam  Vitals and nursing note reviewed.   Constitutional:       General: She is not in acute distress.     Appearance: She is well-developed.   HENT:      Head: Normocephalic and atraumatic.      Nose: Nose normal.      Mouth/Throat:      Mouth: Mucous membranes are moist.      Pharynx: Oropharynx is clear.   Eyes:      Extraocular Movements: Extraocular movements intact.      Pupils: Pupils are equal, round, and reactive to light.   Neck:      Vascular: No JVD.   Cardiovascular:      Rate and Rhythm: Normal rate and regular rhythm.      Pulses: Normal pulses.      Heart sounds: Normal heart sounds.   Pulmonary:      Effort: Pulmonary effort is normal. No respiratory distress.      Breath sounds: Rhonchi present.   Abdominal:      General: Bowel sounds are normal. There is no distension.      Palpations: Abdomen is soft.      Tenderness: There is no abdominal tenderness.   Musculoskeletal:         General: Normal range of motion.      Cervical back: Normal range of motion and neck supple.      Right lower leg: No edema.      Left lower leg: No edema.   Skin:     General: Skin is warm and dry.      Capillary Refill: Capillary refill takes less than 2 seconds.      Findings: No rash.   Neurological:      General: No focal deficit present.      Mental Status: She is alert and oriented to person, place, and time.      Cranial Nerves: No cranial nerve deficit.   Psychiatric:         Mood and Affect: Mood normal.         Behavior: Behavior normal.       Lab Results   Component Value Date    GLU 82 10/25/2021    CHLPL 200 (H) 10/25/2021    TRIG 155 (H) 10/25/2021 "    HDL 53 10/25/2021     (H) 10/25/2021    VLDL 27 10/25/2021            HEALTH RISK ASSESSMENT    Smoking Status:  Social History     Tobacco Use   Smoking Status Never Smoker   Smokeless Tobacco Never Used     Alcohol Consumption:  Social History     Substance and Sexual Activity   Alcohol Use No     Fall Risk Screen:    IVANADI Fall Risk Assessment was completed, and patient is at MODERATE risk for falls. Assessment completed on:10/27/2021    Depression Screening:  PHQ-2/PHQ-9 Depression Screening 10/27/2021   Little interest or pleasure in doing things 0   Feeling down, depressed, or hopeless 0   Trouble falling or staying asleep, or sleeping too much -   Feeling tired or having little energy -   Poor appetite or overeating -   Feeling bad about yourself - or that you are a failure or have let yourself or your family down -   Trouble concentrating on things, such as reading the newspaper or watching television -   Moving or speaking so slowly that other people could have noticed. Or the opposite - being so fidgety or restless that you have been moving around a lot more than usual -   Thoughts that you would be better off dead, or of hurting yourself in some way -   Total Score 0   If you checked off any problems, how difficult have these problems made it for you to do your work, take care of things at home, or get along with other people? -       Health Habits and Functional and Cognitive Screening:  Functional & Cognitive Status 10/27/2021   Do you have difficulty preparing food and eating? No   Do you have difficulty bathing yourself, getting dressed or grooming yourself? No   Do you have difficulty using the toilet? No   Do you have difficulty moving around from place to place? No   Do you have trouble with steps or getting out of a bed or a chair? No   Current Diet Well Balanced Diet   Dental Exam Up to date   Eye Exam Up to date   Exercise (times per week) 0 times per week   Current Exercises Include  No Regular Exercise   Current Exercise Activities Include -   Do you need help using the phone?  No   Are you deaf or do you have serious difficulty hearing?  No   Do you need help with transportation? No   Do you need help shopping? No   Do you need help preparing meals?  No   Do you need help with housework?  No   Do you need help with laundry? No   Do you need help taking your medications? No   Do you need help managing money? No   Do you ever drive or ride in a car without wearing a seat belt? No   Have you felt unusual stress, anger or loneliness in the last month? No   Who do you live with? Spouse   If you need help, do you have trouble finding someone available to you? No   Have you been bothered in the last four weeks by sexual problems? No   Do you have difficulty concentrating, remembering or making decisions? No       Age-appropriate Screening Schedule:  Refer to the list below for future screening recommendations based on patient's age, sex and/or medical conditions. Orders for these recommended tests are listed in the plan section. The patient has been provided with a written plan.    Health Maintenance   Topic Date Due   • DXA SCAN  Never done   • ZOSTER VACCINE (1 of 2) Never done   • MAMMOGRAM  Never done   • INFLUENZA VACCINE  08/01/2021   • TDAP/TD VACCINES (2 - Td or Tdap) 09/22/2028 (Originally 7/8/2020)   • LIPID PANEL  10/25/2022              Assessment/Plan   CMS Preventative Services Quick Reference  Risk Factors Identified During Encounter  Cardiovascular Disease  Inactivity/Sedentary  Obesity/Overweight   Polypharmacy  The above risks/problems have been discussed with the patient.  Follow up actions/plans if indicated are seen below in the Assessment/Plan Section.  Pertinent information has been shared with the patient in the After Visit Summary.    Diagnoses and all orders for this visit:    1. Medicare annual wellness visit, subsequent (Primary)    2. Essential hypertension    3. COPD  with exacerbation (HCC)    4. Anxiety    5. Gastroesophageal reflux disease without esophagitis    6. Neck muscle spasm    7. Panlobular emphysema (HCC)    8. Lumbar paraspinal muscle spasm    9. Reactive depression    10. Flu vaccine need  -     Fluzone High-Dose 65+yrs ([id number])        Follow Up:   Return in about 6 months (around 4/27/2022) for Recheck, Next scheduled follow up.     An After Visit Summary and PPPS were made available to the patient.

## 2021-10-27 NOTE — PATIENT INSTRUCTIONS
Suspect Essential HTN.Good BP control is encouraged with Goal BP based on JNC 8 guidelines 2014 <140/90 for patients with known cardiac disease and diabetes. (MARQUIS. 2014:322 (5):507-520. doi:10.1001/marquis.2013.08823): general population <60 yr old goal BP <140/90 and for those >60 <150/90.  For patients of all ages with Diabetes, CKD, Known CAD <140/90. Recommended to the patient to obtain electronic home BP machine with upper arm blood pressure cuff and to check regularly as instructed.  Keep BP log and bring to subsequent visits. Stable, at goal.  a. LABS: routine for hypertension recommended and ordered if necessary.  b. Recommend if you do not have a home BP machine to obtain an electronic machine with arm blood pressure cuff.      c. Monitor BP over the next week and keep log to bring back to office. Discussed medication therapy however pt wants to try to control with diet exercise. .  Your provider  has recommended self-monitoring of your blood pressure.  If you do not have a blood pressure cuff you may purchase one from the local pharmacy.  You may ask the pharmacist which brand and model they recommend.  Obtain your blood pressure measurement at least 2x per week.  You should also check your blood pressure if you experience any symptoms of blurred visit, dizziness or headache.  Please record all blood pressure measurements and bring them to next office visit.  If you have any questions about the accuracy of your blood pressure machine please bring it in to the office and our staff will be happy to check accuracy.   d. Encouraged to eat a low sodium heart healthy diet  e. Offered handout on HTN educational topics.  These were provided if patient requested these today.  f. MEDS: as listed in today's visit.  g. Risks/benefits of current and new medications discussed with the patient and or family today.  The patient/family are aware and accept that if there any side effects they should call or return to clinic  as soon as possible.  Appropriate F/U discussed for topics addressed today. All questions were answered to the  satisfactory state of patient/family.  Should symptoms fail to improve or worsen they agree to call or return to clinic or to go to the ER. Education handouts were offered on any new Rx if requested.  Discussed the importance of following up with any needed screening tests/labs/specialist appointments and any requested follow-up recommended by me today.  Importance of maintaining follow-up discussed and patient accepts that missed appointments can delay diagnosis and potentially lead to worsening of conditions.      Fat and Cholesterol Restricted Eating Plan  Getting too much fat and cholesterol in your diet may cause health problems. Choosing the right foods helps keep your fat and cholesterol at normal levels. This can keep you from getting certain diseases.  Your doctor may recommend an eating plan that includes:  · Total fat: ______% or less of total calories a day.  · Saturated fat: ______% or less of total calories a day.  · Cholesterol: less than _________mg a day.  · Fiber: ______g a day.  What are tips for following this plan?  Meal planning  · At meals, divide your plate into four equal parts:  ? Fill one-half of your plate with vegetables and green salads.  ? Fill one-fourth of your plate with whole grains.  ? Fill one-fourth of your plate with low-fat (lean) protein foods.  · Eat fish that is high in omega-3 fats at least two times a week. This includes mackerel, tuna, sardines, and salmon.  · Eat foods that are high in fiber, such as whole grains, beans, apples, broccoli, carrots, peas, and barley.  General tips    · Work with your doctor to lose weight if you need to.  · Avoid:  ? Foods with added sugar.  ? Fried foods.  ? Foods with partially hydrogenated oils.  · Limit alcohol intake to no more than 1 drink a day for nonpregnant women and 2 drinks a day for men. One drink equals 12 oz of  "beer, 5 oz of wine, or 1½ oz of hard liquor.    Reading food labels  · Check food labels for:  ? Trans fats.  ? Partially hydrogenated oils.  ? Saturated fat (g) in each serving.  ? Cholesterol (mg) in each serving.  ? Fiber (g) in each serving.  · Choose foods with healthy fats, such as:  ? Monounsaturated fats.  ? Polyunsaturated fats.  ? Omega-3 fats.  · Choose grain products that have whole grains. Look for the word \"whole\" as the first word in the ingredient list.  Cooking  · Cook foods using low-fat methods. These include baking, boiling, grilling, and broiling.  · Eat more home-cooked foods. Eat at restaurants and buffets less often.  · Avoid cooking using saturated fats, such as butter, cream, palm oil, palm kernel oil, and coconut oil.  Recommended foods    Fruits  · All fresh, canned (in natural juice), or frozen fruits.  Vegetables  · Fresh or frozen vegetables (raw, steamed, roasted, or grilled). Green salads.  Grains  · Whole grains, such as whole wheat or whole grain breads, crackers, cereals, and pasta. Unsweetened oatmeal, bulgur, barley, quinoa, or brown rice. Corn or whole wheat flour tortillas.  Meats and other protein foods  · Ground beef (85% or leaner), grass-fed beef, or beef trimmed of fat. Skinless chicken or turkey. Ground chicken or turkey. Pork trimmed of fat. All fish and seafood. Egg whites. Dried beans, peas, or lentils. Unsalted nuts or seeds. Unsalted canned beans. Nut butters without added sugar or oil.  Dairy  · Low-fat or nonfat dairy products, such as skim or 1% milk, 2% or reduced-fat cheeses, low-fat and fat-free ricotta or cottage cheese, or plain low-fat and nonfat yogurt.  Fats and oils  · Tub margarine without trans fats. Light or reduced-fat mayonnaise and salad dressings. Avocado. Olive, canola, sesame, or safflower oils.  The items listed above may not be a complete list of foods and beverages you can eat. Contact a dietitian for more information.  Foods to " avoid  Fruits  · Canned fruit in heavy syrup. Fruit in cream or butter sauce. Fried fruit.  Vegetables  · Vegetables cooked in cheese, cream, or butter sauce. Fried vegetables.  Grains  · White bread. White pasta. White rice. Cornbread. Bagels, pastries, and croissants. Crackers and snack foods that contain trans fat and hydrogenated oils.  Meats and other protein foods  · Fatty cuts of meat. Ribs, chicken wings, warner, sausage, bologna, salami, chitterlings, fatback, hot dogs, bratwurst, and packaged lunch meats. Liver and organ meats. Whole eggs and egg yolks. Chicken and turkey with skin. Fried meat.  Dairy  · Whole or 2% milk, cream, half-and-half, and cream cheese. Whole milk cheeses. Whole-fat or sweetened yogurt. Full-fat cheeses. Nondairy creamers and whipped toppings. Processed cheese, cheese spreads, and cheese curds.  Beverages  · Alcohol. Sugar-sweetened drinks such as sodas, lemonade, and fruit drinks.  Fats and oils  · Butter, stick margarine, lard, shortening, ghee, or warner fat. Coconut, palm kernel, and palm oils.  Sweets and desserts  · Corn syrup, sugars, honey, and molasses. Candy. Jam and jelly. Syrup. Sweetened cereals. Cookies, pies, cakes, donuts, muffins, and ice cream.  The items listed above may not be a complete list of foods and beverages you should avoid. Contact a dietitian for more information.  Summary  · Choosing the right foods helps keep your fat and cholesterol at normal levels. This can keep you from getting certain diseases.  · At meals, fill one-half of your plate with vegetables and green salads.  · Eat high-fiber foods, like whole grains, beans, apples, carrots, peas, and barley.  · Limit added sugar, saturated fats, alcohol, and fried foods.  This information is not intended to replace advice given to you by your health care provider. Make sure you discuss any questions you have with your health care provider.  Document Revised: 04/21/2021 Document Reviewed:  04/21/2021  Elsevier Patient Education © 2021 [employer]

## 2021-11-05 DIAGNOSIS — J20.9 ACUTE BRONCHITIS, UNSPECIFIED ORGANISM: ICD-10-CM

## 2021-11-05 RX ORDER — DEXTROMETHORPHAN HYDROBROMIDE AND PROMETHAZINE HYDROCHLORIDE 15; 6.25 MG/5ML; MG/5ML
5 SYRUP ORAL EVERY 6 HOURS PRN
Qty: 240 ML | Refills: 0 | Status: SHIPPED | OUTPATIENT
Start: 2021-11-05 | End: 2022-05-06 | Stop reason: SDUPTHER

## 2021-11-05 NOTE — TELEPHONE ENCOUNTER
Pt called, she is requesting a refill of the medication attached to this encounter. Pt stated that she is spitting up green mucus. Can this be sent?

## 2021-11-16 ENCOUNTER — TELEPHONE (OUTPATIENT)
Dept: FAMILY MEDICINE CLINIC | Facility: CLINIC | Age: 71
End: 2021-11-16

## 2021-11-16 NOTE — TELEPHONE ENCOUNTER
"Pt called, she is coughing up green mucus, her nose is also running. Pt stated she \"knows she needs an antibiotic.\" Per Pt, Amoxicillin has been prescribed to her in the past for this. She is asking if this can be called in to [facility]?    "

## 2021-11-17 DIAGNOSIS — J01.00 ACUTE MAXILLARY SINUSITIS, RECURRENCE NOT SPECIFIED: Primary | ICD-10-CM

## 2021-11-17 RX ORDER — AMOXICILLIN 500 MG/1
500 CAPSULE ORAL 3 TIMES DAILY
Qty: 21 CAPSULE | Refills: 0 | Status: SHIPPED | OUTPATIENT
Start: 2021-11-17 | End: 2021-11-24

## 2021-12-06 ENCOUNTER — TELEPHONE (OUTPATIENT)
Dept: FAMILY MEDICINE CLINIC | Facility: CLINIC | Age: 71
End: 2021-12-06

## 2021-12-06 NOTE — TELEPHONE ENCOUNTER
Pt called, she is coughing and very congested. Moved appt up to next opening with Dr. Jacobs 12/10/21. Pt is asking if a cough suppressant and an antibiotic can be called in to [facility]? Please advise.

## 2021-12-07 DIAGNOSIS — R05.9 COUGH: Primary | ICD-10-CM

## 2021-12-07 RX ORDER — BENZONATATE 100 MG/1
100 CAPSULE ORAL 3 TIMES DAILY PRN
Qty: 30 CAPSULE | Refills: 0 | Status: SHIPPED | OUTPATIENT
Start: 2021-12-07 | End: 2021-12-20 | Stop reason: SDUPTHER

## 2021-12-07 NOTE — TELEPHONE ENCOUNTER
I sent in for cough but no antibiotic.  Most of what we are seeing is viral.  Keep appt on Friday.  If symptoms worsen, she needs to be swabbed for Covid.

## 2021-12-10 ENCOUNTER — OFFICE VISIT (OUTPATIENT)
Dept: FAMILY MEDICINE CLINIC | Facility: CLINIC | Age: 71
End: 2021-12-10

## 2021-12-10 VITALS
HEIGHT: 60 IN | DIASTOLIC BLOOD PRESSURE: 79 MMHG | WEIGHT: 158 LBS | SYSTOLIC BLOOD PRESSURE: 141 MMHG | BODY MASS INDEX: 31.02 KG/M2 | OXYGEN SATURATION: 96 % | HEART RATE: 55 BPM | TEMPERATURE: 97.7 F

## 2021-12-10 DIAGNOSIS — I10 ESSENTIAL HYPERTENSION: ICD-10-CM

## 2021-12-10 DIAGNOSIS — H65.23 BILATERAL CHRONIC SEROUS OTITIS MEDIA: ICD-10-CM

## 2021-12-10 DIAGNOSIS — J30.2 SEASONAL ALLERGIC RHINITIS, UNSPECIFIED TRIGGER: ICD-10-CM

## 2021-12-10 DIAGNOSIS — F41.9 ANXIETY: ICD-10-CM

## 2021-12-10 PROCEDURE — 99213 OFFICE O/P EST LOW 20 MIN: CPT | Performed by: FAMILY MEDICINE

## 2021-12-10 RX ORDER — LOSARTAN POTASSIUM 100 MG/1
TABLET ORAL
Qty: 90 TABLET | Refills: 0 | Status: SHIPPED | OUTPATIENT
Start: 2021-12-10 | End: 2022-03-01

## 2021-12-10 RX ORDER — LEVOCETIRIZINE DIHYDROCHLORIDE 5 MG/1
TABLET, FILM COATED ORAL
Qty: 90 TABLET | Refills: 0 | Status: SHIPPED | OUTPATIENT
Start: 2021-12-10 | End: 2022-03-01

## 2021-12-10 RX ORDER — ALPRAZOLAM 1 MG/1
1 TABLET ORAL 3 TIMES DAILY PRN
Qty: 90 TABLET | Refills: 2 | Status: SHIPPED | OUTPATIENT
Start: 2021-12-10 | End: 2022-03-15

## 2021-12-10 NOTE — PROGRESS NOTES
OFFICE VISIT NOTE:    Shanell Coffman is a 71 y.o. female who presents today for URI, Anxiety (3 month med refill Xanax), and Headache.     Patient presents for follow-up of long term use of high risk medication (narcotics, sedatives, stimulants or other controlled medications). The patient has read and signed the [facility] Controlled Substance Contract - copy in chart and updated annually. A recent Jethro was reviewed and is present in the chart, updated annually and as needed. UDS has been reviewed and is up to date, and performed at least annually and PRN discretion of provider. No aberrant behavioral issues are noted, and no significant side effects/complications are present. The patient is aware of the potential for addiction and dependence of these medications and accepts responsibility for proper med use. Patient is aware of the PRN use of these medications (unless otherwise prescribed), and not to be used routinely.     Began with her cough and URI symptoms Nov 5th, and intermittently since then. Has not been able to walk to bathroom in home. Hasn't been out of house at all since the onset. No exposure noted to COVID.     URI   This is a chronic problem. The current episode started more than 1 year ago. The problem has been unchanged. There has been no fever. Associated symptoms include headaches. Pertinent negatives include no abdominal pain, chest pain or rash. She has tried acetaminophen, increased fluids, inhaler use and sleep for the symptoms. The treatment provided significant relief.   Anxiety  Presents for follow-up visit. Symptoms include nervous/anxious behavior. Patient reports no chest pain, excessive worry or shortness of breath. Symptoms occur most days. The severity of symptoms is severe. The quality of sleep is good. Nighttime awakenings: occasional.     Compliance with medications is %.   Headache   This is a chronic problem. The current episode started 1 to 4 weeks ago. The problem  occurs intermittently. The problem has been waxing and waning. The pain is located in the bilateral region. The pain does not radiate. The pain quality is similar to prior headaches. The quality of the pain is described as band-like and squeezing. Pertinent negatives include no abdominal pain, fever or weight loss. The treatment provided mild relief.        Past medical/surgical history, Family history, Social history, Allergies and Medications have been reviewed with the patient today and are updated in [facility] EMR. See below.  Past Medical History:   Diagnosis Date   • Anxiety    • Chronic pain    • COPD (chronic obstructive pulmonary disease) (HCC)    • GERD (gastroesophageal reflux disease)    • Hyperlipidemia      Past Surgical History:   Procedure Laterality Date   • APPENDECTOMY     • CHOLECYSTECTOMY     • COLON SURGERY     • TUBAL ABDOMINAL LIGATION       Family History   Problem Relation Age of Onset   • Heart disease Mother    • Hyperlipidemia Mother    • Hypertension Mother    • Cancer Father    • Mental illness Father    • Diabetes Maternal Grandmother    • Heart attack Maternal Grandfather    • Obesity Paternal Grandmother    • Stroke Paternal Grandfather      Social History     Tobacco Use   • Smoking status: Never Smoker   • Smokeless tobacco: Never Used   Substance Use Topics   • Alcohol use: No   • Drug use: No       ALLERGIES:  Ceclor [cefaclor], Keflex [cephalexin], Stadol [butorphanol], Sulfa antibiotics, Levofloxacin, and Seroquel [quetiapine]    CURRENT MEDS:    Current Outpatient Medications:   •  albuterol (PROVENTIL) (2.5 MG/3ML) 0.083% nebulizer solution, USE 3 ML PER NEBULIZER 4 TIMES A DAY, Disp: 375 mL, Rfl: 0  •  ALPRAZolam (Xanax) 1 MG tablet, Take 1 tablet by mouth 3 (Three) Times a Day As Needed for Anxiety., Disp: 90 tablet, Rfl: 2  •  BIOTIN PO, Take  by mouth Daily., Disp: , Rfl:   •  cloNIDine (CATAPRES) 0.1 MG tablet, TAKE 1 TABLET BY MOUTH TWICE A DAY, Disp: 180 tablet, Rfl: 1  •   dicyclomine (BENTYL) 10 MG capsule, Take 1 capsule by mouth 4 (Four) Times a Day., Disp: 360 capsule, Rfl: 1  •  EPINEPHrine (EPIPEN) 0.3 MG/0.3ML solution auto-injector injection, INJECT 0.3 ML INTO THE APPROPRIATE MUSCLE AS DIRECTED BY PRESCRIBER 1 (ONE) TIME FOR 1 DOSE., Disp: , Rfl: 1  •  escitalopram (LEXAPRO) 20 MG tablet, TAKE 1 TABLET BY MOUTH EVERYDAY AT BEDTIME, Disp: 90 tablet, Rfl: 1  •  furosemide (LASIX) 40 MG tablet, Take 1 tablet by mouth Daily., Disp: 90 tablet, Rfl: 1  •  ketoconazole (NIZORAL) 2 % cream, Apply  topically to the appropriate area as directed Daily., Disp: 30 g, Rfl: 1  •  levocetirizine (XYZAL) 5 MG tablet, TAKE 1 TABLET BY MOUTH EVERY DAY IN THE EVENING, Disp: 90 tablet, Rfl: 0  •  losartan (COZAAR) 100 MG tablet, TAKE 1 TABLET BY MOUTH EVERY DAY, Disp: 90 tablet, Rfl: 0  •  metoclopramide (REGLAN) 10 MG tablet, Take 1 tablet by mouth 4 (Four) Times a Day Before Meals & at Bedtime., Disp: 360 tablet, Rfl: 1  •  multivitamins-minerals (PRESERVISION AREDS 2) capsule capsule, Take 1 capsule by mouth 2 (Two) Times a Day., Disp: 180 capsule, Rfl: 1  •  nabumetone (RELAFEN) 500 MG tablet, Take 1 tablet by mouth 2 (Two) Times a Day As Needed for Moderate Pain ., Disp: 180 tablet, Rfl: 1  •  nystatin (MYCOSTATIN) 509994 UNIT/GM powder, Apply  topically to the appropriate area as directed 3 (Three) Times a Day., Disp: 60 g, Rfl: 3  •  O2 (OXYGEN), Inhale 2 L/min 1 (One) Time., Disp: , Rfl:   •  ondansetron ODT (ZOFRAN-ODT) 4 MG disintegrating tablet, Place 1 tablet on the tongue Every 8 (Eight) Hours As Needed for Nausea or Vomiting., Disp: 30 tablet, Rfl: 1  •  oxyCODONE-acetaminophen (PERCOCET) 7.5-325 MG per tablet, Take 1 tablet by mouth Every 8 (Eight) Hours As Needed for Severe Pain ., Disp: 30 tablet, Rfl: 0  •  pantoprazole (PROTONIX) 40 MG EC tablet, TAKE 1 TABLET BY MOUTH EVERY DAY, Disp: 90 tablet, Rfl: 1  •  potassium chloride ER (K-TAB) 20 MEQ tablet controlled-release ER  tablet, Take 1 tablet by mouth Daily., Disp: 90 tablet, Rfl: 1  •  promethazine-dextromethorphan (PROMETHAZINE-DM) 6.25-15 MG/5ML syrup, TAKE 5 ML BY MOUTH EVERY 6 (SIX) HOURS AS NEEDED FOR COUGH., Disp: 240 mL, Rfl: 0  •  SPIRIVA RESPIMAT 2.5 MCG/ACT aerosol solution inhaler, INHALE 2 PUFFS BY MOUTH DAILY, Disp: 4 g, Rfl: 5  •  terbinafine (lamiSIL) 250 MG tablet, Take 1 tablet by mouth Daily., Disp: 14 tablet, Rfl: 0  •  tiZANidine (ZANAFLEX) 4 MG tablet, TAKE 1 TABLET BY MOUTH EVERY 8 (EIGHT) HOURS AS NEEDED FOR MUSCLE SPASMS., Disp: 270 tablet, Rfl: 1  •  traZODone (DESYREL) 150 MG tablet, Take 1 tablet by mouth every night at bedtime., Disp: 90 tablet, Rfl: 1  •  amLODIPine (NORVASC) 10 MG tablet, Take 1 tablet by mouth Daily., Disp: 90 tablet, Rfl: 1  •  benzonatate (Tessalon Perles) 100 MG capsule, Take 1 capsule by mouth 3 (Three) Times a Day As Needed for Cough., Disp: 30 capsule, Rfl: 2  •  hydrALAZINE (APRESOLINE) 25 MG tablet, Take 1 tablet by mouth 3 (Three) Times a Day., Disp: 270 tablet, Rfl: 1  •  ipratropium-albuterol (DUO-NEB) 0.5-2.5 mg/3 ml nebulizer, Take 3 mL by nebulization Every 4 (Four) Hours As Needed for Wheezing or Shortness of Air. Can alternate with albuterol., Disp: 360 mL, Rfl: 1  •  metoprolol succinate XL (TOPROL-XL) 100 MG 24 hr tablet, Take 1 tablet by mouth Daily., Disp: 90 tablet, Rfl: 1    REVIEW OF SYSTEMS:  Review of Systems   Constitutional: Negative for activity change, fatigue, fever, unexpected weight gain and unexpected weight loss.   Respiratory: Negative for shortness of breath.    Cardiovascular: Negative for chest pain.   Gastrointestinal: Negative for abdominal pain.   Genitourinary: Negative for difficulty urinating.   Skin: Negative for rash.   Neurological: Negative for syncope and headache.   Psychiatric/Behavioral: The patient is nervous/anxious.        PHYSICAL EXAMINATION:  Vital Signs:  /79 (BP Location: Right arm, Patient Position: Sitting, Cuff Size:  "Large Adult)   Pulse 55   Temp 97.7 °F (36.5 °C) (Infrared)   Ht 152.4 cm (60\")   Wt 71.7 kg (158 lb)   LMP  (LMP Unknown)   SpO2 96%   Breastfeeding No   BMI 30.86 kg/m²   Vitals:    12/10/21 1322   Patient Position: Sitting       Physical Exam  Vitals and nursing note reviewed.   Constitutional:       General: She is not in acute distress.     Appearance: She is well-developed.   HENT:      Head: Normocephalic and atraumatic.      Nose: Mucosal edema and congestion present.      Mouth/Throat:      Mouth: Mucous membranes are moist.      Pharynx: Oropharynx is clear.   Eyes:      Extraocular Movements: Extraocular movements intact.      Pupils: Pupils are equal, round, and reactive to light.   Neck:      Vascular: No JVD.   Cardiovascular:      Rate and Rhythm: Normal rate and regular rhythm.      Pulses: Normal pulses.      Heart sounds: Normal heart sounds.   Pulmonary:      Effort: Pulmonary effort is normal. No respiratory distress.      Breath sounds: Rhonchi present.   Abdominal:      General: Bowel sounds are normal. There is no distension.      Palpations: Abdomen is soft.      Tenderness: There is no abdominal tenderness.   Musculoskeletal:         General: Normal range of motion.      Cervical back: Normal range of motion and neck supple.      Right lower leg: No edema.      Left lower leg: No edema.   Skin:     General: Skin is warm and dry.      Capillary Refill: Capillary refill takes less than 2 seconds.      Findings: No rash.   Neurological:      General: No focal deficit present.      Mental Status: She is alert and oriented to person, place, and time.      Cranial Nerves: No cranial nerve deficit.   Psychiatric:         Mood and Affect: Mood normal.         Behavior: Behavior normal.         Procedures    ASSESSMENT/ PLAN:  Problem List Items Addressed This Visit        Mental Health    Anxiety    Relevant Medications    ALPRAZolam (Xanax) 1 MG tablet            Specific Patient " Instructions:  MEDICATION Instructions: Encouraged patient to continue routine medicines as prescribed and maintain compliance. Patient instructed to report any adverse side effects or reactions to medicines promptly to the office. Patient instructed to make us aware of any OTC or herbal meds or supplement use.    DIET Recommendations: Patient instructed and provided information on the following nutrition and diet recommendations: Calorie restriction for weight reduction and maintenance. Necessity for adequate daily intake of fluids/water. Also, diet information provided in AVS for specifics.    EXERCISE Instructions: Discussed with patient the need for routine aerobic activity for cardiovascular fitness, 3 times a week for about 30 minutes. Daily exercise for increased fitness and weight reduction goals.    SMOKING Recommendations: Counseled patient and encouraged them on smoking and tobacco cessation if applicable. Discussed the benefits to all body systems with smoking/tobacco cessation, including decreased cardiac/lung/stroke/cancer risk. Encouraged no vaping use.    HEALTH MAINTENANCE:  Counseling provided to patient/family about routine health maintenance and ANNUAL physicals/labs. Counseling on recommended Vaccinations appropriate for age needed.        Patient's Body mass index is 30.86 kg/m². indicating that she is obese (BMI >30). Obesity-related health conditions include the following: hypertension. Obesity is unchanged. BMI is is above average; BMI management plan is completed. We discussed portion control and increasing exercise..      Medications or Orders placed this visit:  No orders of the defined types were placed in this encounter.      Medications DISCONTINUED this visit:  Medications Discontinued During This Encounter   Medication Reason   • ALPRAZolam (Xanax) 1 MG tablet Reorder       FOLLOW-UP:  Return in about 3 months (around 3/10/2022) for Recheck, Next scheduled follow up.    I discussed  the patients findings and my recommendations with patient.  An After Visit Summary (AVS) was printed and given to the patient at discharge.        Reji Jacobs MD, FAAFP  12/10/2021

## 2021-12-10 NOTE — PATIENT INSTRUCTIONS
"[email or web address]   Generalized Anxiety Disorder, Adult  Generalized anxiety disorder (SHENG) is a mental health condition. Unlike normal worries, anxiety related to SHENG is not triggered by a specific event. These worries do not fade or get better with time. SHENG interferes with relationships, work, and school.  SHENG symptoms can vary from mild to severe. People with severe SHENG can have intense waves of anxiety with physical symptoms that are similar to panic attacks.  What are the causes?  The exact cause of SHENG is not known, but the following are believed to have an impact:  · Differences in natural brain chemicals.  · Genes passed down from parents to children.  · Differences in the way threats are perceived.  · Development during childhood.  · Personality.  What increases the risk?  The following factors may make you more likely to develop this condition:  · Being female.  · Having a family history of anxiety disorders.  · Being very shy.  · Experiencing very stressful life events, such as the death of a loved one.  · Having a very stressful family environment.  What are the signs or symptoms?  People with SHENG often worry excessively about many things in their lives, such as their health and family. Symptoms may also include:  · Mental and emotional symptoms:  ? Worrying excessively about natural disasters.  ? Fear of being late.  ? Difficulty concentrating.  ? Fears that others are judging your performance.  · Physical symptoms:  ? Fatigue.  ? Headaches, muscle tension, muscle twitches, trembling, or feeling shaky.  ? Feeling like your heart is pounding or beating very fast.  ? Feeling out of breath or like you cannot take a deep breath.  ? Having trouble falling asleep or staying asleep, or experiencing restlessness.  ? Sweating.  ? Nausea, diarrhea, or irritable bowel syndrome (IBS).  · Behavioral symptoms:  ? Experiencing erratic moods or irritability.  ? Avoidance of new situations.  ? Avoidance of " people.  ? Extreme difficulty making decisions.  How is this diagnosed?  This condition is diagnosed based on your symptoms and medical history. You will also have a physical exam. Your health care provider may perform tests to rule out other possible causes of your symptoms.  To be diagnosed with SHENG, a person must have anxiety that:  · Is out of his or her control.  · Affects several different aspects of his or her life, such as work and relationships.  · Causes distress that makes him or her unable to take part in normal activities.  · Includes at least three symptoms of SHENG, such as restlessness, fatigue, trouble concentrating, irritability, muscle tension, or sleep problems.  Before your health care provider can confirm a diagnosis of SHENG, these symptoms must be present more days than they are not, and they must last for 6 months or longer.  How is this treated?  This condition may be treated with:  · Medicine. Antidepressant medicine is usually prescribed for long-term daily control. Anti-anxiety medicines may be added in severe cases, especially when panic attacks occur.  · Talk therapy (psychotherapy). Certain types of talk therapy can be helpful in treating SHENG by providing support, education, and guidance. Options include:  ? Cognitive behavioral therapy (CBT). People learn coping skills and self-calming techniques to ease their physical symptoms. They learn to identify unrealistic thoughts and behaviors and to replace them with more appropriate thoughts and behaviors.  ? Acceptance and commitment therapy (ACT). This treatment teaches people how to be mindful as a way to cope with unwanted thoughts and feelings.  ? Biofeedback. This process trains you to manage your body's response (physiological response) through breathing techniques and relaxation methods. You will work with a therapist while machines are used to monitor your physical symptoms.  · Stress management techniques. These include yoga,  meditation, and exercise.  A mental health specialist can help determine which treatment is best for you. Some people see improvement with one type of therapy. However, other people require a combination of therapies.  Follow these instructions at home:  Lifestyle  · Maintain a consistent routine and schedule.  · Anticipate stressful situations. Create a plan, and allow extra time to work with your plan.  · Practice stress management or self-calming techniques that you have learned from your therapist or your health care provider.  General instructions  · Take over-the-counter and prescription medicines only as told by your health care provider.  · Understand that you are likely to have setbacks. Accept this and be kind to yourself as you persist to take better care of yourself.  · Recognize and accept your accomplishments, even if you  them as small.  · Keep all follow-up visits as told by your health care provider. This is important.  Contact a health care provider if:  · Your symptoms do not get better.  · Your symptoms get worse.  · You have signs of depression, such as:  ? A persistently sad or irritable mood.  ? Loss of enjoyment in activities that used to bring you bella.  ? Change in weight or eating.  ? Changes in sleeping habits.  ? Avoiding friends or family members.  ? Loss of energy for normal tasks.  ? Feelings of guilt or worthlessness.  Get help right away if:  · You have serious thoughts about hurting yourself or others.  If you ever feel like you may hurt yourself or others, or have thoughts about taking your own life, get help right away. Go to your nearest emergency department or:  · Call your local emergency services (721 in the [place]  · Call a suicide crisis helpline, such as the National Suicide Prevention Lifeline at 1-237.361.7145. This is open 24 hours a day in the [place]  · Text the Crisis Text Line at 595752 (in the [place]  Summary  · Generalized anxiety disorder (SHENG) is a mental  health condition that involves worry that is not triggered by a specific event.  · People with SHENG often worry excessively about many things in their lives, such as their health and family.  · SHENG may cause symptoms such as restlessness, trouble concentrating, sleep problems, frequent sweating, nausea, diarrhea, headaches, and trembling or muscle twitching.  · A mental health specialist can help determine which treatment is best for you. Some people see improvement with one type of therapy. However, other people require a combination of therapies.  This information is not intended to replace advice given to you by your health care provider. Make sure you discuss any questions you have with your health care provider.  Document Revised: 10/07/2020 Document Reviewed: 10/07/2020  [employer] Patient Education © 2021 [employer]      Acute Bronchitis, Adult    Acute bronchitis is when air tubes in the lungs (bronchi) suddenly get swollen. The condition can make it hard for you to breathe. In adults, acute bronchitis usually goes away within 2 weeks. A cough caused by bronchitis may last up to 3 weeks. Smoking, allergies, and asthma can make the condition worse.  What are the causes?  This condition is caused by:  · Cold and flu viruses. The most common cause of this condition is the virus that causes the common cold.  · Bacteria.  · Substances that irritate the lungs, including:  ? Smoke from cigarettes and other types of tobacco.  ? Dust and pollen.  ? Fumes from chemicals, gases, or burned fuel.  ? Other materials that pollute indoor or outdoor air.  · Close contact with someone who has acute bronchitis.  What increases the risk?  The following factors may make you more likely to develop this condition:  · A weak body's defense system. This is also called the immune system.  · Any condition that affects your lungs and breathing, such as asthma.  What are the signs or symptoms?  Symptoms of this condition include:  · A  cough.  · Coughing up clear, yellow, or green mucus.  · Wheezing.  · Chest congestion.  · Shortness of breath.  · A fever.  · Body aches.  · Chills.  · A sore throat.  How is this treated?  Acute bronchitis may go away over time without treatment. Your doctor may recommend:  · Drinking more fluids.  · Taking a medicine for a fever or cough.  · Using a device that gets medicine into your lungs (inhaler).  · Using a vaporizer or a humidifier. These are machines that add water or moisture in the air to help with coughing and poor breathing.  Follow these instructions at home:    Activity  · Get a lot of rest.  · Avoid places where there are fumes from chemicals.  · Return to your normal activities as told by your doctor. Ask your doctor what activities are safe for you.  Lifestyle  · Drink enough fluids to keep your pee (urine) pale yellow.  · Do not drink alcohol.  · Do not use any products that contain nicotine or tobacco, such as cigarettes, e-cigarettes, and chewing tobacco. If you need help quitting, ask your doctor. Be aware that:  ? Your bronchitis will get worse if you smoke or breathe in other people's smoke (secondhand smoke).  ? Your lungs will heal faster if you quit smoking.  General instructions  · Take over-the-counter and prescription medicines only as told by your doctor.  · Use an inhaler, cool mist vaporizer, or humidifier as told by your doctor.  · Rinse your mouth often with salt water. To make salt water, dissolve ½-1 tsp (3-6 g) of salt in 1 cup (237 mL) of warm water.  · Keep all follow-up visits as told by your doctor. This is important.  How is this prevented?  To lower your risk of getting this condition again:  · Wash your hands often with soap and water. If soap and water are not available, use hand .  · Avoid contact with people who have cold symptoms.  · Try not to touch your mouth, nose, or eyes with your hands.  · Make sure to get the flu shot every year.  Contact a doctor  if:  · Your symptoms do not get better in 2 weeks.  · You vomit more than once or twice.  · You have symptoms of loss of fluid from your body (dehydration). These include:  ? Dark urine.  ? Dry skin or eyes.  ? Increased thirst.  ? Headaches.  ? Confusion.  ? Muscle cramps.  Get help right away if:  · You cough up blood.  · You have chest pain.  · You have very bad shortness of breath.  · You become dehydrated.  · You faint or keep feeling like you are going to faint.  · You keep vomiting.  · You have a very bad headache.  · Your fever or chills get worse.  These symptoms may be an emergency. Do not wait to see if the symptoms will go away. Get medical help right away. Call your local emergency services (911 in the [place] Do not drive yourself to the hospital.  Summary  · Acute bronchitis is when air tubes in the lungs (bronchi) suddenly get swollen. In adults, acute bronchitis usually goes away within 2 weeks.  · Take over-the-counter and prescription medicines only as told by your doctor.  · Drink enough fluid to keep your pee (urine) pale yellow.  · Contact a doctor if your symptoms do not improve after 2 weeks of treatment.  · Get help right away if you cough up blood, faint, or have chest pain or shortness of breath.  This information is not intended to replace advice given to you by your health care provider. Make sure you discuss any questions you have with your health care provider.  Document Revised: 07/10/2020 Document Reviewed: 07/10/2020  [employer] Patient Education © 2021 [employer]

## 2021-12-20 ENCOUNTER — OFFICE VISIT (OUTPATIENT)
Dept: FAMILY MEDICINE CLINIC | Facility: CLINIC | Age: 71
End: 2021-12-20

## 2021-12-20 VITALS
SYSTOLIC BLOOD PRESSURE: 144 MMHG | OXYGEN SATURATION: 96 % | BODY MASS INDEX: 30.86 KG/M2 | DIASTOLIC BLOOD PRESSURE: 78 MMHG | HEART RATE: 72 BPM | HEIGHT: 60 IN

## 2021-12-20 DIAGNOSIS — Z99.81 SUPPLEMENTAL OXYGEN DEPENDENT: ICD-10-CM

## 2021-12-20 DIAGNOSIS — I10 ESSENTIAL HYPERTENSION: ICD-10-CM

## 2021-12-20 DIAGNOSIS — F41.9 ANXIETY: ICD-10-CM

## 2021-12-20 DIAGNOSIS — J44.1 COPD WITH EXACERBATION (HCC): Primary | ICD-10-CM

## 2021-12-20 DIAGNOSIS — R05.9 COUGH: ICD-10-CM

## 2021-12-20 PROCEDURE — 96372 THER/PROPH/DIAG INJ SC/IM: CPT | Performed by: NURSE PRACTITIONER

## 2021-12-20 PROCEDURE — 99214 OFFICE O/P EST MOD 30 MIN: CPT | Performed by: NURSE PRACTITIONER

## 2021-12-20 RX ORDER — HYDRALAZINE HYDROCHLORIDE 25 MG/1
25 TABLET, FILM COATED ORAL 3 TIMES DAILY
Qty: 270 TABLET | Refills: 1 | Status: SHIPPED | OUTPATIENT
Start: 2021-12-20 | End: 2022-06-27

## 2021-12-20 RX ORDER — TRIAMCINOLONE ACETONIDE 40 MG/ML
80 INJECTION, SUSPENSION INTRA-ARTICULAR; INTRAMUSCULAR ONCE
Status: COMPLETED | OUTPATIENT
Start: 2021-12-20 | End: 2021-12-20

## 2021-12-20 RX ORDER — AMLODIPINE BESYLATE 10 MG/1
10 TABLET ORAL DAILY
Qty: 90 TABLET | Refills: 1 | Status: SHIPPED | OUTPATIENT
Start: 2021-12-20

## 2021-12-20 RX ORDER — IPRATROPIUM BROMIDE AND ALBUTEROL SULFATE 2.5; .5 MG/3ML; MG/3ML
3 SOLUTION RESPIRATORY (INHALATION) EVERY 4 HOURS PRN
Qty: 360 ML | Refills: 1 | Status: SHIPPED | OUTPATIENT
Start: 2021-12-20 | End: 2022-06-21

## 2021-12-20 RX ORDER — BENZONATATE 100 MG/1
100 CAPSULE ORAL 3 TIMES DAILY PRN
Qty: 30 CAPSULE | Refills: 2 | Status: SHIPPED | OUTPATIENT
Start: 2021-12-20 | End: 2022-03-15

## 2021-12-20 RX ORDER — METOPROLOL SUCCINATE 100 MG/1
100 TABLET, EXTENDED RELEASE ORAL DAILY
Qty: 90 TABLET | Refills: 1 | Status: SHIPPED | OUTPATIENT
Start: 2021-12-20 | End: 2022-03-15 | Stop reason: SDUPTHER

## 2021-12-20 RX ADMIN — TRIAMCINOLONE ACETONIDE 80 MG: 40 INJECTION, SUSPENSION INTRA-ARTICULAR; INTRAMUSCULAR at 14:38

## 2021-12-20 NOTE — PROGRESS NOTES
"Chief Complaint  ER Follow up (Pt was at [facility]) and Med Refill (Multiple Meds)    Subjective          Shanell Coffman presents to [facility]  History of Present Illness  [facility] ER visit on 12/10/2021 after office visit (routine) where she had persistent and significant cough present since 11/5/21.  She received some IV medications.  She was prescribed Cipro but she missed days due to unavailability.  She has been on Augmentin and Cipro.  Breathing treatments of albuterol 3 x/day. No expectorant of mucus.  She has coughing fits that last for several minutes.   states she is \"some better\" than she was when Dr. Jacobs saw her and sent her to ER.    ER records have been requested but unavailable at time of visit.    Objective   Vital Signs:   /78 (BP Location: Right arm, Patient Position: Sitting, Cuff Size: Adult)   Pulse 72   Ht 152.4 cm (60\")   SpO2 96% Comment: Pt on 2 liters of O2  BMI 30.86 kg/m²     Physical Exam  Vitals and nursing note reviewed. Exam conducted with a chaperone present (, Mohit.).   Constitutional:       General: She is not in acute distress.     Appearance: Normal appearance. She is ill-appearing.   HENT:      Head: Normocephalic and atraumatic.      Right Ear: Tympanic membrane and ear canal normal.      Left Ear: Tympanic membrane and ear canal normal.      Nose: Nose normal.      Mouth/Throat:      Mouth: Mucous membranes are moist.      Pharynx: Oropharynx is clear.   Cardiovascular:      Rate and Rhythm: Normal rate and regular rhythm.      Heart sounds: Normal heart sounds. No murmur heard.      Pulmonary:      Effort: Pulmonary effort is normal.      Breath sounds: Wheezing and rhonchi present.      Comments: Expiratory wheezes and rhonchi present throughout lung fields.  Musculoskeletal:      Cervical back: Normal range of motion and neck supple.   Skin:     General: Skin is warm and dry.   Neurological:      Mental Status: She is alert " and oriented to person, place, and time.   Psychiatric:         Thought Content: Thought content normal.        Result Review :                 Assessment and Plan    Diagnoses and all orders for this visit:    1. COPD with exacerbation (HCC) (Primary)  -     ipratropium-albuterol (DUO-NEB) 0.5-2.5 mg/3 ml nebulizer; Take 3 mL by nebulization Every 4 (Four) Hours As Needed for Wheezing or Shortness of Air. Can alternate with albuterol.  Dispense: 360 mL; Refill: 1  -     triamcinolone acetonide (KENALOG-40) injection 80 mg    2. Supplemental oxygen dependent  -     ipratropium-albuterol (DUO-NEB) 0.5-2.5 mg/3 ml nebulizer; Take 3 mL by nebulization Every 4 (Four) Hours As Needed for Wheezing or Shortness of Air. Can alternate with albuterol.  Dispense: 360 mL; Refill: 1    3. Cough  -     benzonatate (Tessalon Perles) 100 MG capsule; Take 1 capsule by mouth 3 (Three) Times a Day As Needed for Cough.  Dispense: 30 capsule; Refill: 2  -     ipratropium-albuterol (DUO-NEB) 0.5-2.5 mg/3 ml nebulizer; Take 3 mL by nebulization Every 4 (Four) Hours As Needed for Wheezing or Shortness of Air. Can alternate with albuterol.  Dispense: 360 mL; Refill: 1    4. Essential hypertension  -     metoprolol succinate XL (TOPROL-XL) 100 MG 24 hr tablet; Take 1 tablet by mouth Daily.  Dispense: 90 tablet; Refill: 1  -     amLODIPine (NORVASC) 10 MG tablet; Take 1 tablet by mouth Daily.  Dispense: 90 tablet; Refill: 1  -     hydrALAZINE (APRESOLINE) 25 MG tablet; Take 1 tablet by mouth 3 (Three) Times a Day.  Dispense: 270 tablet; Refill: 1    5. Anxiety  -     metoprolol succinate XL (TOPROL-XL) 100 MG 24 hr tablet; Take 1 tablet by mouth Daily.  Dispense: 90 tablet; Refill: 1        Follow Up   Return for keep patricia appt.  Patient was given instructions and counseling regarding her condition or for health maintenance advice. Please see specific information pulled into the AVS if appropriate.

## 2021-12-21 ENCOUNTER — TELEPHONE (OUTPATIENT)
Dept: FAMILY MEDICINE CLINIC | Facility: CLINIC | Age: 71
End: 2021-12-21

## 2021-12-21 NOTE — TELEPHONE ENCOUNTER
Pt called, stated that she is no better today. She is still very congested and has fluid in her lungs. Pt wanted to let Mrs. Whitman know this.   Pt was seen in office 12/20/21.

## 2021-12-21 NOTE — TELEPHONE ENCOUNTER
Patient needs to go to ER of choice if she feels that she has fluid building up.  She needs urgent evaluation.

## 2021-12-27 ENCOUNTER — TELEPHONE (OUTPATIENT)
Dept: FAMILY MEDICINE CLINIC | Facility: CLINIC | Age: 71
End: 2021-12-27

## 2021-12-29 DIAGNOSIS — K21.9 GASTROESOPHAGEAL REFLUX DISEASE WITHOUT ESOPHAGITIS: Primary | ICD-10-CM

## 2021-12-29 RX ORDER — DICYCLOMINE HYDROCHLORIDE 10 MG/1
10 CAPSULE ORAL 4 TIMES DAILY
Qty: 360 CAPSULE | Refills: 1 | Status: SHIPPED | OUTPATIENT
Start: 2021-12-29 | End: 2022-06-27

## 2022-01-28 RX ORDER — FLUCONAZOLE 150 MG/1
150 TABLET ORAL ONCE
Qty: 1 TABLET | Refills: 0 | Status: SHIPPED | OUTPATIENT
Start: 2022-01-28 | End: 2022-01-28

## 2022-01-29 DIAGNOSIS — F51.01 PRIMARY INSOMNIA: ICD-10-CM

## 2022-01-31 RX ORDER — TRAZODONE HYDROCHLORIDE 150 MG/1
TABLET ORAL
Qty: 90 TABLET | Refills: 1 | Status: SHIPPED | OUTPATIENT
Start: 2022-01-31

## 2022-02-28 DIAGNOSIS — H65.23 BILATERAL CHRONIC SEROUS OTITIS MEDIA: ICD-10-CM

## 2022-02-28 DIAGNOSIS — R10.10 PAIN OF UPPER ABDOMEN: ICD-10-CM

## 2022-02-28 DIAGNOSIS — I10 ESSENTIAL HYPERTENSION: ICD-10-CM

## 2022-02-28 DIAGNOSIS — J30.2 SEASONAL ALLERGIC RHINITIS, UNSPECIFIED TRIGGER: ICD-10-CM

## 2022-03-01 RX ORDER — POTASSIUM CHLORIDE 1500 MG/1
TABLET, FILM COATED, EXTENDED RELEASE ORAL
Qty: 90 TABLET | Refills: 1 | Status: SHIPPED | OUTPATIENT
Start: 2022-03-01

## 2022-03-01 RX ORDER — METOCLOPRAMIDE 10 MG/1
10 TABLET ORAL
Qty: 360 TABLET | Refills: 1 | Status: SHIPPED | OUTPATIENT
Start: 2022-03-01

## 2022-03-01 RX ORDER — LOSARTAN POTASSIUM 100 MG/1
TABLET ORAL
Qty: 90 TABLET | Refills: 1 | Status: SHIPPED | OUTPATIENT
Start: 2022-03-01 | End: 2022-09-01

## 2022-03-01 RX ORDER — LEVOCETIRIZINE DIHYDROCHLORIDE 5 MG/1
TABLET, FILM COATED ORAL
Qty: 90 TABLET | Refills: 1 | Status: SHIPPED | OUTPATIENT
Start: 2022-03-01 | End: 2022-09-07

## 2022-03-01 NOTE — TELEPHONE ENCOUNTER
Rx Refill Note  Requested Prescriptions     Pending Prescriptions Disp Refills   • losartan (COZAAR) 100 MG tablet [Pharmacy Med Name: LOSARTAN POTASSIUM 100 MG TAB] 90 tablet 0     Sig: TAKE 1 TABLET BY MOUTH EVERY DAY   • metoclopramide (REGLAN) 10 MG tablet [Pharmacy Med Name: METOCLOPRAMIDE 10 MG TABLET] 360 tablet 1     Sig: TAKE 1 TABLET BY MOUTH 4 (FOUR) TIMES A DAY BEFORE MEALS & AT BEDTIME.   • potassium chloride ER (K-TAB) 20 MEQ tablet controlled-release ER tablet [Pharmacy Med Name: POTASSIUM CL ER 20 MEQ TABLET] 90 tablet 1     Sig: TAKE 1 TABLET BY MOUTH EVERY DAY   • levocetirizine (XYZAL) 5 MG tablet [Pharmacy Med Name: LEVOCETIRIZINE 5 MG TABLET] 90 tablet 0     Sig: TAKE 1 TABLET BY MOUTH EVERY DAY IN THE EVENING      Last office visit with prescribing clinician: 12/10/2021      Next office visit with prescribing clinician: 3/16/2022     Neda Raymundo MA  03/01/22, 07:52 CST

## 2022-03-04 DIAGNOSIS — K21.9 GASTROESOPHAGEAL REFLUX DISEASE WITHOUT ESOPHAGITIS: ICD-10-CM

## 2022-03-07 RX ORDER — PANTOPRAZOLE SODIUM 40 MG/1
TABLET, DELAYED RELEASE ORAL
Qty: 90 TABLET | Refills: 1 | Status: SHIPPED | OUTPATIENT
Start: 2022-03-07 | End: 2022-10-10 | Stop reason: SDUPTHER

## 2022-03-07 NOTE — TELEPHONE ENCOUNTER
Rx Refill Note  Requested Prescriptions     Pending Prescriptions Disp Refills   • pantoprazole (PROTONIX) 40 MG EC tablet [Pharmacy Med Name: PANTOPRAZOLE SOD DR 40 MG TAB] 90 tablet 1     Sig: TAKE 1 TABLET BY MOUTH EVERY DAY      Last office visit with prescribing clinician: 12/20/2021      Next office visit with prescribing clinician: 3/15/2022     Neda Raymundo MA  03/07/22, 10:43 CST

## 2022-03-09 ENCOUNTER — TELEPHONE (OUTPATIENT)
Dept: FAMILY MEDICINE CLINIC | Facility: CLINIC | Age: 72
End: 2022-03-09

## 2022-03-09 DIAGNOSIS — R53.83 FATIGUE, UNSPECIFIED TYPE: ICD-10-CM

## 2022-03-09 DIAGNOSIS — Z00.00 MEDICARE ANNUAL WELLNESS VISIT, SUBSEQUENT: Primary | ICD-10-CM

## 2022-03-09 DIAGNOSIS — I10 ESSENTIAL HYPERTENSION: ICD-10-CM

## 2022-03-11 DIAGNOSIS — M62.838 NECK MUSCLE SPASM: ICD-10-CM

## 2022-03-11 RX ORDER — TIZANIDINE 4 MG/1
TABLET ORAL
Qty: 270 TABLET | Refills: 0 | Status: SHIPPED | OUTPATIENT
Start: 2022-03-11

## 2022-03-11 NOTE — TELEPHONE ENCOUNTER
Rx Refill Note  Requested Prescriptions     Pending Prescriptions Disp Refills   • tiZANidine (ZANAFLEX) 4 MG tablet [Pharmacy Med Name: TIZANIDINE HCL 4 MG TABLET] 270 tablet 2     Sig: TAKE 1 TABLET BY MOUTH EVERY 8 HOURS AS NEEDED FOR MUSCLE SPASMS      Last office visit with prescribing clinician: 12/10/2021      Next office visit with prescribing clinician:03/15/2022  }  Neda Raymundo MA  03/11/22, 12:36 CST

## 2022-03-15 ENCOUNTER — OFFICE VISIT (OUTPATIENT)
Dept: FAMILY MEDICINE CLINIC | Facility: CLINIC | Age: 72
End: 2022-03-15

## 2022-03-15 VITALS
OXYGEN SATURATION: 95 % | WEIGHT: 166.2 LBS | SYSTOLIC BLOOD PRESSURE: 186 MMHG | HEART RATE: 56 BPM | TEMPERATURE: 96.7 F | HEIGHT: 60 IN | DIASTOLIC BLOOD PRESSURE: 126 MMHG | BODY MASS INDEX: 32.63 KG/M2

## 2022-03-15 DIAGNOSIS — F41.9 ANXIETY: ICD-10-CM

## 2022-03-15 DIAGNOSIS — I10 UNCONTROLLED HYPERTENSION: ICD-10-CM

## 2022-03-15 DIAGNOSIS — G43.101 MIGRAINE WITH AURA AND WITH STATUS MIGRAINOSUS, NOT INTRACTABLE: Primary | ICD-10-CM

## 2022-03-15 DIAGNOSIS — I10 ESSENTIAL HYPERTENSION: ICD-10-CM

## 2022-03-15 PROCEDURE — 99214 OFFICE O/P EST MOD 30 MIN: CPT | Performed by: NURSE PRACTITIONER

## 2022-03-15 PROCEDURE — 96372 THER/PROPH/DIAG INJ SC/IM: CPT | Performed by: NURSE PRACTITIONER

## 2022-03-15 RX ORDER — KETOROLAC TROMETHAMINE 30 MG/ML
60 INJECTION, SOLUTION INTRAMUSCULAR; INTRAVENOUS ONCE
Status: COMPLETED | OUTPATIENT
Start: 2022-03-15 | End: 2022-03-15

## 2022-03-15 RX ORDER — ALPRAZOLAM 1 MG/1
1 TABLET ORAL 4 TIMES DAILY PRN
Qty: 360 TABLET | Refills: 0 | Status: SHIPPED | OUTPATIENT
Start: 2022-03-15 | End: 2022-05-24 | Stop reason: SDUPTHER

## 2022-03-15 RX ORDER — METOPROLOL SUCCINATE 100 MG/1
100 TABLET, EXTENDED RELEASE ORAL DAILY
Qty: 90 TABLET | Refills: 1 | Status: SHIPPED | OUTPATIENT
Start: 2022-03-15

## 2022-03-15 RX ORDER — CLONIDINE HYDROCHLORIDE 0.1 MG/1
0.1 TABLET ORAL 3 TIMES DAILY
Qty: 270 TABLET | Refills: 1 | Status: SHIPPED | OUTPATIENT
Start: 2022-03-15 | End: 2022-07-22

## 2022-03-15 RX ADMIN — KETOROLAC TROMETHAMINE 60 MG: 30 INJECTION, SOLUTION INTRAMUSCULAR; INTRAVENOUS at 10:14

## 2022-03-15 NOTE — PROGRESS NOTES
"Chief Complaint  Anxiety (3 month med refill for Xanax), Back Pain (3 month med refill OV for Percocet), and Urinary Tract Infection    Subjective          Shanell Coffman presents to [facility]  History of Present Illness  MIGRAINE:  Patient has complaints of a migraine that has persisted for 3 weeks. She has photophobia, nausea, vomiting, and decreased appetite. She is unable to watch T.V. due to this. The pain kept her from sleeping last night. Pain in located on bilateral temples and wraps around her head. Pain is characterized as a sharp stabbing pain, rating her pain at an 8/10.   ANXIETY: Anxiety is \"sherry high\" right now due to anniversary of loss of her daughter. Patient takes Xanax TID and feels that her dosage could be increased. It does help alleviate her anxiety for about 2 hours, but quickly goes away.  She has been compliant with use of medication.  RENETTA reviewed.  UDS and controlled substance agreement are up to date.  BACK PAIN: Patient currently taking Zanaflex, Ralafen and Percocet. She does not feel like current medication is managing back pain well. Range of motion in neck is limited. Patient rates neck and back pain as an 8/10. Patient does not use OTC pain relievers. She has tried hot compresses without relief. Patient sees Pain Management.  UTI?: Patient has been experiencing increased frequency and burning with urination. She has since started taking Pyridium a week ago and symptoms have improved.    HTN: Patient takes clonidine, losartan, metoprolol, and amlodipine daily. She checks BP daily, with an average blood pressure reading around 200/ . Patient denies chest pain, palpitations, or lightheadedness.   Objective   Vital Signs:   BP (!) 186/126 (BP Location: Right arm, Patient Position: Sitting, Cuff Size: Adult)   Pulse 56   Temp 96.7 °F (35.9 °C) (Infrared)   Ht 152.4 cm (60\")   Wt 75.4 kg (166 lb 3.2 oz)   SpO2 95% Comment: Pt currently on 2 " liters of O2  BMI 32.46 kg/m²     Physical Exam  Constitutional:       General: She is not in acute distress.     Appearance: Normal appearance. She is obese. She is not ill-appearing.   HENT:      Head: Normocephalic and atraumatic.   Cardiovascular:      Rate and Rhythm: Regular rhythm. Bradycardia present.      Pulses: Normal pulses.      Heart sounds: Normal heart sounds.   Pulmonary:      Effort: Pulmonary effort is normal.      Breath sounds: Normal breath sounds.      Comments: Diminished throughout.  On supplemental oxygen.  Skin:     General: Skin is warm and dry.      Capillary Refill: Capillary refill takes less than 2 seconds.   Neurological:      Mental Status: She is alert and oriented to person, place, and time.   Psychiatric:         Thought Content: Thought content normal.        Result Review :                 Assessment and Plan    Diagnoses and all orders for this visit:    1. Migraine with aura and with status migrainosus, not intractable (Primary)  -     ketorolac (TORADOL) injection 60 mg    2. Anxiety  -     ALPRAZolam (Xanax) 1 MG tablet; Take 1 tablet by mouth 4 (Four) Times a Day As Needed for Anxiety.  Dispense: 360 tablet; Refill: 0  -     metoprolol succinate XL (TOPROL-XL) 100 MG 24 hr tablet; Take 1 tablet by mouth Daily.  Dispense: 90 tablet; Refill: 1    3. Essential hypertension  -     metoprolol succinate XL (TOPROL-XL) 100 MG 24 hr tablet; Take 1 tablet by mouth Daily.  Dispense: 90 tablet; Refill: 1    4. Uncontrolled hypertension  -     cloNIDine (CATAPRES) 0.1 MG tablet; Take 1 tablet by mouth 3 (Three) Times a Day.  Dispense: 270 tablet; Refill: 1    Patient encouraged to call provider tomorrow if headache AND/OR elevated b/p are present.  She verbalized understanding and agreement.    Follow Up   Return in about 3 months (around 6/15/2022) for Next scheduled follow up.  Patient was given instructions and counseling regarding her condition or for health maintenance advice.  Please see specific information pulled into the AVS if appropriate.

## 2022-03-16 ENCOUNTER — TELEPHONE (OUTPATIENT)
Dept: FAMILY MEDICINE CLINIC | Facility: CLINIC | Age: 72
End: 2022-03-16

## 2022-03-16 DIAGNOSIS — J20.9 ACUTE BRONCHITIS, UNSPECIFIED ORGANISM: ICD-10-CM

## 2022-03-16 NOTE — TELEPHONE ENCOUNTER
Patient called to give Antonette update on how she is feeling from yesterday's OV. Patient states her BP is down from yesterday and SWAIN is doing some better.

## 2022-03-16 NOTE — TELEPHONE ENCOUNTER
Pt called to report BP reading of 288/142. Pt stated she still has a slight headache. Pt spoke with myself and Kizzy. Pt was advised to seek attention at local ER for elevated BP.

## 2022-03-16 NOTE — TELEPHONE ENCOUNTER
I spoke with patient in regards to her BP which is currently 288/142. Patient advised she has just taken her second dose of Clonidine. Patient advised earlier today it was approx 228/130. I advised patient to go to the ER. Patient stated her  was currently at town and she would wait until he got home and if the Clonidine did not make her BP go down she would go to the ER. Once again strongly advised patient to go to the ER. Patient stated she would if BP did not go down.  Advised patient to call our office in the morning to report BP and if she went to ER. Patient voiced understanding.

## 2022-03-17 ENCOUNTER — TELEPHONE (OUTPATIENT)
Dept: FAMILY MEDICINE CLINIC | Facility: CLINIC | Age: 72
End: 2022-03-17

## 2022-03-17 NOTE — TELEPHONE ENCOUNTER
Pt called to report her BP reading for today, 212/114. She stated that if it has not lowered by tomorrow, she will go to ER. Pt has concerns regarding what physician will be available to treat her at [facility], she stated this is why she hasn't went yet.

## 2022-03-18 NOTE — TELEPHONE ENCOUNTER
Attempted to call patient back but the reception was so bad, I could not hear her.  I will continue from home.  I want her to double her clonidine through the weekend, go to the ER with any hypertension related symptoms, and call readings to the office on Monday.

## 2022-03-21 NOTE — TELEPHONE ENCOUNTER
Returned call to Pt, no answer, LVM requesting phone call back. Pt has appt scheduled in off 3/23/22.

## 2022-03-22 ENCOUNTER — TELEPHONE (OUTPATIENT)
Dept: FAMILY MEDICINE CLINIC | Facility: CLINIC | Age: 72
End: 2022-03-22

## 2022-03-23 NOTE — TELEPHONE ENCOUNTER
Pt canceled her apt today- per apt notes states that her BP is much better will call back at a later date.

## 2022-05-06 ENCOUNTER — TELEPHONE (OUTPATIENT)
Dept: FAMILY MEDICINE CLINIC | Facility: CLINIC | Age: 72
End: 2022-05-06

## 2022-05-06 DIAGNOSIS — J20.9 ACUTE BRONCHITIS, UNSPECIFIED ORGANISM: ICD-10-CM

## 2022-05-06 RX ORDER — DEXTROMETHORPHAN HYDROBROMIDE AND PROMETHAZINE HYDROCHLORIDE 15; 6.25 MG/5ML; MG/5ML
5 SYRUP ORAL EVERY 6 HOURS PRN
Qty: 240 ML | Refills: 0 | Status: SHIPPED | OUTPATIENT
Start: 2022-05-06

## 2022-05-06 RX ORDER — DOXYCYCLINE HYCLATE 100 MG/1
100 CAPSULE ORAL 2 TIMES DAILY
Qty: 14 CAPSULE | Refills: 0 | Status: SHIPPED | OUTPATIENT
Start: 2022-05-06

## 2022-05-06 NOTE — TELEPHONE ENCOUNTER
PT called to speak with Kizzy, I informed her she was out on lunch and I would put a note in for someone to get in touch with her.     PT C/O of body shakes and splitting headache starting last night.    Please Advise.

## 2022-05-06 NOTE — TELEPHONE ENCOUNTER
Called and spoke with patient and she advised she woke up in the middle of the night with fever, aches, chills, and congestion. Patient is asking for something to be called into [facility].

## 2022-05-17 ENCOUNTER — TELEPHONE (OUTPATIENT)
Dept: FAMILY MEDICINE CLINIC | Facility: CLINIC | Age: 72
End: 2022-05-17

## 2022-05-17 DIAGNOSIS — K29.00 ACUTE GASTRITIS WITHOUT HEMORRHAGE, UNSPECIFIED GASTRITIS TYPE: Primary | ICD-10-CM

## 2022-05-17 RX ORDER — AZITHROMYCIN 250 MG/1
TABLET, FILM COATED ORAL
Qty: 6 TABLET | Refills: 0 | Status: SHIPPED | OUTPATIENT
Start: 2022-05-17 | End: 2022-05-24

## 2022-05-17 RX ORDER — METHYLPREDNISOLONE 4 MG/1
TABLET ORAL
Qty: 1 EACH | Refills: 0 | Status: SHIPPED | OUTPATIENT
Start: 2022-05-17 | End: 2022-05-24

## 2022-05-17 NOTE — TELEPHONE ENCOUNTER
Patient called and stated she has a virus and has vomiting and diarrhea and also a long of pain in her back . Patient is asking an antibiotic be sent to [facility] in [place]. Patient asking that a steroid valentina also be sent in [facility].

## 2022-05-24 ENCOUNTER — OFFICE VISIT (OUTPATIENT)
Dept: FAMILY MEDICINE CLINIC | Facility: CLINIC | Age: 72
End: 2022-05-24

## 2022-05-24 VITALS
OXYGEN SATURATION: 95 % | HEART RATE: 82 BPM | WEIGHT: 163 LBS | TEMPERATURE: 98.2 F | BODY MASS INDEX: 32 KG/M2 | DIASTOLIC BLOOD PRESSURE: 80 MMHG | SYSTOLIC BLOOD PRESSURE: 147 MMHG | HEIGHT: 60 IN

## 2022-05-24 DIAGNOSIS — F41.9 ANXIETY: ICD-10-CM

## 2022-05-24 DIAGNOSIS — Z20.822 CLOSE EXPOSURE TO COVID-19 VIRUS: ICD-10-CM

## 2022-05-24 DIAGNOSIS — Z99.81 SUPPLEMENTAL OXYGEN DEPENDENT: ICD-10-CM

## 2022-05-24 DIAGNOSIS — J44.1 COPD WITH EXACERBATION: ICD-10-CM

## 2022-05-24 DIAGNOSIS — J20.9 ACUTE BRONCHITIS, UNSPECIFIED ORGANISM: Primary | ICD-10-CM

## 2022-05-24 PROCEDURE — 99214 OFFICE O/P EST MOD 30 MIN: CPT | Performed by: NURSE PRACTITIONER

## 2022-05-24 RX ORDER — BACLOFEN 10 MG/1
10 TABLET ORAL DAILY
COMMUNITY
Start: 2022-05-02

## 2022-05-24 RX ORDER — ALPRAZOLAM 1 MG/1
1 TABLET ORAL 4 TIMES DAILY PRN
Qty: 120 TABLET | Refills: 2 | Status: SHIPPED | OUTPATIENT
Start: 2022-05-24

## 2022-05-24 RX ORDER — PROMETHAZINE HYDROCHLORIDE AND CODEINE PHOSPHATE 6.25; 1 MG/5ML; MG/5ML
5 SOLUTION ORAL EVERY 4 HOURS PRN
Qty: 240 ML | Refills: 0 | Status: SHIPPED | OUTPATIENT
Start: 2022-05-24

## 2022-05-24 RX ORDER — CLARITHROMYCIN 500 MG/1
500 TABLET, COATED ORAL 2 TIMES DAILY
Qty: 14 TABLET | Refills: 0 | Status: SHIPPED | OUTPATIENT
Start: 2022-05-24

## 2022-05-24 NOTE — PROGRESS NOTES
"Chief Complaint  congestion (Been sick for 3 weeks)    Subjective          Shanell Coffman presents to [facility]  History of Present Illness  Patient presents with continued chest congestion despite completion of steroid pack and antibiotics.  She has a history of COPD and is oxygen dependent.  Both she and her  have had the same symptoms x 3 weeks.  She admittedly has improved more than he has.  She is afebrile, no recent fevers, no GI complaints, mild sinus pressure, main complaint of congested cough with yellowish productive sputum.    Objective   Vital Signs:  /80 (BP Location: Right arm, Patient Position: Sitting, Cuff Size: Adult)   Pulse 82   Temp 98.2 °F (36.8 °C) (Infrared)   Ht 152.4 cm (60\")   Wt 73.9 kg (163 lb)   SpO2 95%   BMI 31.83 kg/m²            Physical Exam  Vitals and nursing note reviewed.   Constitutional:       General: She is not in acute distress.     Appearance: Normal appearance. She is not ill-appearing.   HENT:      Head: Normocephalic and atraumatic.      Right Ear: Tympanic membrane and ear canal normal.      Left Ear: Tympanic membrane and ear canal normal.      Nose: Nose normal.      Mouth/Throat:      Mouth: Mucous membranes are moist.      Pharynx: Oropharynx is clear.   Cardiovascular:      Rate and Rhythm: Normal rate and regular rhythm.      Heart sounds: Normal heart sounds.   Pulmonary:      Effort: Pulmonary effort is normal.      Breath sounds: Wheezing present.      Comments: Fine expiratory wheezes in upper lobes with diminished breath sounds throughout lung fields.  Abdominal:      General: Bowel sounds are normal.      Palpations: Abdomen is soft.   Musculoskeletal:      Cervical back: Neck supple.   Lymphadenopathy:      Cervical: Cervical adenopathy present.   Skin:     General: Skin is warm and dry.   Neurological:      Mental Status: She is alert and oriented to person, place, and time.   Psychiatric:         " Thought Content: Thought content normal.        Result Review :       Rapid Covid swab performed in office was negative.            Assessment and Plan    Diagnoses and all orders for this visit:    1. Acute bronchitis, unspecified organism (Primary)  -     clarithromycin (Biaxin) 500 MG tablet; Take 1 tablet by mouth 2 (Two) Times a Day.  Dispense: 14 tablet; Refill: 0  -     promethazine-codeine (PHENERGAN with CODEINE) 6.25-10 MG/5ML solution; Take 5 mL by mouth Every 4 (Four) Hours As Needed for Cough.  Dispense: 240 mL; Refill: 0    2. COPD with exacerbation (HCC)  -     clarithromycin (Biaxin) 500 MG tablet; Take 1 tablet by mouth 2 (Two) Times a Day.  Dispense: 14 tablet; Refill: 0  -     promethazine-codeine (PHENERGAN with CODEINE) 6.25-10 MG/5ML solution; Take 5 mL by mouth Every 4 (Four) Hours As Needed for Cough.  Dispense: 240 mL; Refill: 0    3. Close exposure to COVID-19 virus    4. Supplemental oxygen dependent    5. Anxiety  -     ALPRAZolam (Xanax) 1 MG tablet; Take 1 tablet by mouth 4 (Four) Times a Day As Needed for Anxiety.  Dispense: 120 tablet; Refill: 2             Follow Up   Return if symptoms worsen or fail to improve.  Patient was given instructions and counseling regarding her condition or for health maintenance advice. Please see specific information pulled into the AVS if appropriate.

## 2022-06-21 DIAGNOSIS — Z99.81 SUPPLEMENTAL OXYGEN DEPENDENT: ICD-10-CM

## 2022-06-21 DIAGNOSIS — R05.9 COUGH: ICD-10-CM

## 2022-06-21 DIAGNOSIS — J44.1 COPD WITH EXACERBATION: ICD-10-CM

## 2022-06-21 RX ORDER — IPRATROPIUM BROMIDE AND ALBUTEROL SULFATE 2.5; .5 MG/3ML; MG/3ML
SOLUTION RESPIRATORY (INHALATION)
Qty: 360 ML | Refills: 1 | Status: SHIPPED | OUTPATIENT
Start: 2022-06-21 | End: 2022-10-03

## 2022-06-21 NOTE — TELEPHONE ENCOUNTER
Rx Refill Note  Requested Prescriptions     Pending Prescriptions Disp Refills   • ipratropium-albuterol (DUO-NEB) 0.5-2.5 mg/3 ml nebulizer [Pharmacy Med Name: IPRAT-ALBUT 0.5-3(2.5) MG/3 ML] 360 mL 1     Si VIAL VIA NEBULIZER EVERY 4 HOURS AS NEEDED FOR WHEEZE/SHORT AIR, MAY ALTERNATE W ALBUTEROL      Last office visit with prescribing clinician: 2022      Next office visit with prescribing clinician: 2022     Neda Raymundo MA  22, 07:26 CDT

## 2022-06-27 DIAGNOSIS — K21.9 GASTROESOPHAGEAL REFLUX DISEASE WITHOUT ESOPHAGITIS: ICD-10-CM

## 2022-06-27 DIAGNOSIS — I10 ESSENTIAL HYPERTENSION: ICD-10-CM

## 2022-06-27 RX ORDER — DICYCLOMINE HYDROCHLORIDE 10 MG/1
CAPSULE ORAL
Qty: 360 CAPSULE | Refills: 1 | Status: SHIPPED | OUTPATIENT
Start: 2022-06-27 | End: 2022-10-11

## 2022-06-27 RX ORDER — HYDRALAZINE HYDROCHLORIDE 25 MG/1
TABLET, FILM COATED ORAL
Qty: 270 TABLET | Refills: 1 | Status: SHIPPED | OUTPATIENT
Start: 2022-06-27 | End: 2022-10-11

## 2022-06-27 NOTE — TELEPHONE ENCOUNTER
Rx Refill Note  Requested Prescriptions     Pending Prescriptions Disp Refills   • dicyclomine (BENTYL) 10 MG capsule [Pharmacy Med Name: DICYCLOMINE 10 MG CAPSULE] 360 capsule 1     Sig: TAKE 1 CAPSULE BY MOUTH 4 TIMES A DAY.   • hydrALAZINE (APRESOLINE) 25 MG tablet [Pharmacy Med Name: HYDRALAZINE 25 MG TABLET] 270 tablet 1     Sig: TAKE 1 TABLET BY MOUTH THREE TIMES A DAY      Last office visit with prescribing clinician: 5/24/2022      Next office visit with prescribing clinician: 11/1/2022             Angie Nelson MA  06/27/22, 12:02 CDT

## 2022-07-22 DIAGNOSIS — I10 UNCONTROLLED HYPERTENSION: ICD-10-CM

## 2022-07-22 RX ORDER — CLONIDINE HYDROCHLORIDE 0.1 MG/1
TABLET ORAL
Qty: 270 TABLET | Refills: 1 | Status: SHIPPED | OUTPATIENT
Start: 2022-07-22

## 2022-07-22 NOTE — TELEPHONE ENCOUNTER
Rx Refill Note  Requested Prescriptions     Pending Prescriptions Disp Refills   • cloNIDine (CATAPRES) 0.1 MG tablet [Pharmacy Med Name: CLONIDINE HCL 0.1 MG TABLET] 270 tablet 1     Sig: TAKE 1 TABLET BY MOUTH THREE TIMES A DAY      Last office visit with prescribing clinician: 5/24/2022      Next office visit with prescribing clinician: 11/1/2022    LRX:3/15/2022-6 months- refill not duE     Called pharmacy to inquire reports no script on file- please send new one      Payton Rendon MA  07/22/22, 16:11 CDT

## 2022-09-01 DIAGNOSIS — I10 ESSENTIAL HYPERTENSION: ICD-10-CM

## 2022-09-01 RX ORDER — LOSARTAN POTASSIUM 100 MG/1
TABLET ORAL
Qty: 90 TABLET | Refills: 1 | Status: SHIPPED | OUTPATIENT
Start: 2022-09-01 | End: 2023-03-06

## 2022-09-01 NOTE — TELEPHONE ENCOUNTER
Rx Refill Note  Requested Prescriptions     Pending Prescriptions Disp Refills   • losartan (COZAAR) 100 MG tablet [Pharmacy Med Name: LOSARTAN POTASSIUM 100 MG TAB] 90 tablet 1     Sig: TAKE 1 TABLET BY MOUTH EVERY DAY      Last office visit with prescribing clinician:11-     Next office vis23   it with prescribing clinician:11-  LAB:03-  :23}   :23}  Karime Mix MA  09/01/22, 14:10 CDT

## 2022-09-06 DIAGNOSIS — J30.2 SEASONAL ALLERGIC RHINITIS, UNSPECIFIED TRIGGER: ICD-10-CM

## 2022-09-06 DIAGNOSIS — H65.23 BILATERAL CHRONIC SEROUS OTITIS MEDIA: ICD-10-CM

## 2022-09-07 RX ORDER — LEVOCETIRIZINE DIHYDROCHLORIDE 5 MG/1
TABLET, FILM COATED ORAL
Qty: 90 TABLET | Refills: 1 | Status: SHIPPED | OUTPATIENT
Start: 2022-09-07 | End: 2023-03-06

## 2022-09-07 NOTE — TELEPHONE ENCOUNTER
Rx Refill Note  Requested Prescriptions     Pending Prescriptions Disp Refills   • levocetirizine (XYZAL) 5 MG tablet [Pharmacy Med Name: LEVOCETIRIZINE 5 MG TABLET] 90 tablet 1     Sig: TAKE 1 TABLET BY MOUTH EVERY DAY IN THE EVENING      Last office visit with prescribing clinician:  03/15/2022  Next office visit with prescribing clinician: 11/01/2022    LABS:03/17/2022    Karime Mix MA  09/07/22, 09:45 CDT

## 2022-10-01 DIAGNOSIS — R05.9 COUGH: ICD-10-CM

## 2022-10-01 DIAGNOSIS — Z99.81 SUPPLEMENTAL OXYGEN DEPENDENT: ICD-10-CM

## 2022-10-01 DIAGNOSIS — J44.1 COPD WITH EXACERBATION: ICD-10-CM

## 2022-10-03 RX ORDER — IPRATROPIUM BROMIDE AND ALBUTEROL SULFATE 2.5; .5 MG/3ML; MG/3ML
SOLUTION RESPIRATORY (INHALATION)
Qty: 360 ML | Refills: 1 | Status: SHIPPED | OUTPATIENT
Start: 2022-10-03

## 2022-10-08 DIAGNOSIS — I10 ESSENTIAL HYPERTENSION: ICD-10-CM

## 2022-10-08 DIAGNOSIS — K21.9 GASTROESOPHAGEAL REFLUX DISEASE WITHOUT ESOPHAGITIS: ICD-10-CM

## 2022-10-10 DIAGNOSIS — K21.9 GASTROESOPHAGEAL REFLUX DISEASE WITHOUT ESOPHAGITIS: ICD-10-CM

## 2022-10-10 RX ORDER — PANTOPRAZOLE SODIUM 40 MG/1
40 TABLET, DELAYED RELEASE ORAL DAILY
Qty: 90 TABLET | Refills: 1 | Status: SHIPPED | OUTPATIENT
Start: 2022-10-10

## 2022-10-10 NOTE — TELEPHONE ENCOUNTER
Rx Refill Note  Requested Prescriptions     Pending Prescriptions Disp Refills   • pantoprazole (PROTONIX) 40 MG EC tablet 90 tablet 1     Sig: Take 1 tablet by mouth Daily.      Last office visit with prescribing clinician: 5/24/2022      Next office visit with prescribing clinician: 10/10/2022     Neda Raymundo MA  10/10/22, 07:58 CDT

## 2022-10-11 RX ORDER — HYDRALAZINE HYDROCHLORIDE 25 MG/1
TABLET, FILM COATED ORAL
Qty: 270 TABLET | Refills: 1 | Status: SHIPPED | OUTPATIENT
Start: 2022-10-11

## 2022-10-11 RX ORDER — DICYCLOMINE HYDROCHLORIDE 10 MG/1
CAPSULE ORAL
Qty: 360 CAPSULE | Refills: 1 | Status: SHIPPED | OUTPATIENT
Start: 2022-10-11

## 2022-10-11 NOTE — TELEPHONE ENCOUNTER
Rx Refill Note  Requested Prescriptions     Pending Prescriptions Disp Refills   • hydrALAZINE (APRESOLINE) 25 MG tablet [Pharmacy Med Name: HYDRALAZINE 25 MG TABLET] 270 tablet 1     Sig: TAKE 1 TABLET BY MOUTH THREE TIMES A DAY   • dicyclomine (BENTYL) 10 MG capsule [Pharmacy Med Name: DICYCLOMINE 10 MG CAPSULE] 360 capsule 1     Sig: TAKE 1 CAPSULE BY MOUTH FOUR TIMES A DAY      Last office visit with prescribing clinician: 5/24/2022      Next office visit with prescribing clinician: 10/10/2022     Neda Raymundo MA  10/11/22, 07:29 CDT

## 2022-11-18 ENCOUNTER — TELEPHONE (OUTPATIENT)
Dept: FAMILY MEDICINE CLINIC | Facility: CLINIC | Age: 72
End: 2022-11-18

## 2022-12-05 ENCOUNTER — TELEPHONE (OUTPATIENT)
Dept: FAMILY MEDICINE CLINIC | Facility: CLINIC | Age: 72
End: 2022-12-05

## 2023-03-05 DIAGNOSIS — H65.23 BILATERAL CHRONIC SEROUS OTITIS MEDIA: ICD-10-CM

## 2023-03-05 DIAGNOSIS — J30.2 SEASONAL ALLERGIC RHINITIS, UNSPECIFIED TRIGGER: ICD-10-CM

## 2023-03-05 DIAGNOSIS — I10 ESSENTIAL HYPERTENSION: ICD-10-CM

## 2023-03-06 RX ORDER — LOSARTAN POTASSIUM 100 MG/1
TABLET ORAL
Qty: 90 TABLET | Refills: 1 | Status: SHIPPED | OUTPATIENT
Start: 2023-03-06

## 2023-03-06 RX ORDER — LEVOCETIRIZINE DIHYDROCHLORIDE 5 MG/1
TABLET, FILM COATED ORAL
Qty: 90 TABLET | Refills: 1 | Status: SHIPPED | OUTPATIENT
Start: 2023-03-06

## 2023-04-07 DIAGNOSIS — K21.9 GASTROESOPHAGEAL REFLUX DISEASE WITHOUT ESOPHAGITIS: ICD-10-CM

## 2023-04-07 RX ORDER — PANTOPRAZOLE SODIUM 40 MG/1
TABLET, DELAYED RELEASE ORAL
Qty: 90 TABLET | Refills: 1 | OUTPATIENT
Start: 2023-04-07

## 2023-04-26 ENCOUNTER — TELEPHONE (OUTPATIENT)
Dept: NEUROSURGERY | Age: 73
End: 2023-04-26

## 2023-04-27 NOTE — TELEPHONE ENCOUNTER
Called patient to reschedule appointment. Patient agreeable to new appointment time and date and had no questions at this time.

## 2023-06-22 ENCOUNTER — TELEPHONE (OUTPATIENT)
Dept: NEUROLOGY | Age: 73
End: 2023-06-22

## 2023-06-22 ENCOUNTER — OFFICE VISIT (OUTPATIENT)
Dept: NEUROLOGY | Age: 73
End: 2023-06-22
Payer: MEDICARE

## 2023-06-22 VITALS
SYSTOLIC BLOOD PRESSURE: 162 MMHG | HEART RATE: 79 BPM | BODY MASS INDEX: 32.59 KG/M2 | DIASTOLIC BLOOD PRESSURE: 82 MMHG | HEIGHT: 60 IN | OXYGEN SATURATION: 93 % | WEIGHT: 166 LBS
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
## 2023-06-22 DIAGNOSIS — R26.89 IMBALANCE: ICD-10-CM

## 2023-06-22 DIAGNOSIS — G43.809 OTHER MIGRAINE WITHOUT STATUS MIGRAINOSUS, NOT INTRACTABLE: ICD-10-CM

## 2023-06-22 DIAGNOSIS — R55 SYNCOPE, UNSPECIFIED SYNCOPE TYPE: Primary | ICD-10-CM

## 2023-06-22 DIAGNOSIS — I62.00 SUBDURAL HEMORRHAGE (HCC): ICD-10-CM

## 2023-06-22 DIAGNOSIS — S06.9X1D TRAUMATIC BRAIN INJURY, WITH LOSS OF CONSCIOUSNESS OF 30 MINUTES OR LESS, SUBSEQUENT ENCOUNTER: ICD-10-CM

## 2023-06-22 DIAGNOSIS — G44.301 INTRACTABLE POST-TRAUMATIC HEADACHE, UNSPECIFIED CHRONICITY PATTERN: ICD-10-CM

## 2023-06-22 DIAGNOSIS — I44.30 AV BLOCK: ICD-10-CM

## 2023-06-22 DIAGNOSIS — M54.2 NECK PAIN: ICD-10-CM

## 2023-06-22 PROCEDURE — 1123F ACP DISCUSS/DSCN MKR DOCD: CPT | Performed by: NURSE PRACTITIONER

## 2023-06-22 PROCEDURE — 99204 OFFICE O/P NEW MOD 45 MIN: CPT | Performed by: NURSE PRACTITIONER

## 2023-06-22 RX ORDER — ALBUTEROL SULFATE 90 UG/1
2 AEROSOL, METERED RESPIRATORY (INHALATION) EVERY 6 HOURS PRN
COMMUNITY

## 2023-06-22 RX ORDER — ATOGEPANT 60 MG/1
60 TABLET ORAL DAILY
Qty: 30 TABLET | Refills: 3 | Status: SHIPPED | OUTPATIENT
Start: 2023-06-22 | End: 2023-06-22 | Stop reason: SINTOL

## 2023-06-22 RX ORDER — POTASSIUM CHLORIDE 20 MEQ/1
20 TABLET, EXTENDED RELEASE ORAL 2 TIMES DAILY
COMMUNITY

## 2023-06-22 RX ORDER — DEXTROMETHORPHAN HYDROBROMIDE AND PROMETHAZINE HYDROCHLORIDE 15; 6.25 MG/5ML; MG/5ML
5 SYRUP ORAL EVERY 6 HOURS PRN
COMMUNITY
Start: 2022-05-06

## 2023-06-22 RX ORDER — ONDANSETRON 4 MG/1
TABLET, FILM COATED ORAL
COMMUNITY
Start: 2023-04-02

## 2023-06-22 RX ORDER — TRAZODONE HYDROCHLORIDE 150 MG/1
TABLET ORAL
COMMUNITY
Start: 2023-05-22

## 2023-06-22 RX ORDER — NABUMETONE 500 MG/1
TABLET, FILM COATED ORAL
COMMUNITY
Start: 2023-04-17

## 2023-06-22 RX ORDER — BUTALBITAL, ACETAMINOPHEN, CAFFEINE AND CODEINE PHOSPHATE 300; 50; 40; 30 MG/1; MG/1; MG/1; MG/1
1 CAPSULE ORAL EVERY 4 HOURS PRN
Status: CANCELLED | OUTPATIENT
Start: 2023-06-22 | End: 2023-07-02

## 2023-06-22 RX ORDER — PRIMIDONE 50 MG/1
TABLET ORAL
COMMUNITY
Start: 2023-05-22

## 2023-06-22 RX ORDER — POTASSIUM CHLORIDE 1500 MG/1
TABLET, EXTENDED RELEASE ORAL
COMMUNITY
Start: 2023-05-17

## 2023-06-22 RX ORDER — NYSTATIN 100000 [USP'U]/G
POWDER TOPICAL
COMMUNITY
Start: 2020-08-10

## 2023-06-22 RX ORDER — ALPRAZOLAM 1 MG/1
TABLET ORAL
COMMUNITY
Start: 2023-05-20

## 2023-06-22 RX ORDER — CLONIDINE HYDROCHLORIDE 0.1 MG/1
1 TABLET ORAL 3 TIMES DAILY
COMMUNITY
Start: 2022-07-22

## 2023-06-22 RX ORDER — OXYCODONE AND ACETAMINOPHEN 10; 325 MG/1; MG/1
TABLET ORAL
COMMUNITY
Start: 2023-06-21

## 2023-06-22 RX ORDER — DILTIAZEM HYDROCHLORIDE EXTENDED-RELEASE TABLETS 360 MG/1
TABLET, EXTENDED RELEASE ORAL
COMMUNITY
Start: 2023-06-14

## 2023-06-22 RX ORDER — HYDRALAZINE HYDROCHLORIDE 25 MG/1
TABLET, FILM COATED ORAL
COMMUNITY
Start: 2023-05-22

## 2023-06-22 RX ORDER — IPRATROPIUM BROMIDE AND ALBUTEROL SULFATE 2.5; .5 MG/3ML; MG/3ML
SOLUTION RESPIRATORY (INHALATION)
COMMUNITY
Start: 2022-10-03

## 2023-06-22 RX ORDER — DICYCLOMINE HYDROCHLORIDE 10 MG/1
10 CAPSULE ORAL
COMMUNITY

## 2023-06-22 RX ORDER — METOCLOPRAMIDE 5 MG/1
TABLET ORAL
COMMUNITY
Start: 2017-07-11

## 2023-06-22 RX ORDER — DOXYCYCLINE HYCLATE 100 MG/1
100 CAPSULE ORAL 2 TIMES DAILY
COMMUNITY
Start: 2022-05-06

## 2023-06-22 RX ORDER — UBROGEPANT 50 MG/1
TABLET ORAL
Qty: 10 TABLET | Refills: 3 | Status: SHIPPED | OUTPATIENT
Start: 2023-06-22

## 2023-06-22 RX ORDER — BENZONATATE 100 MG/1
100 CAPSULE ORAL 3 TIMES DAILY PRN
COMMUNITY

## 2023-06-22 RX ORDER — METOPROLOL SUCCINATE 100 MG/1
100 TABLET, EXTENDED RELEASE ORAL DAILY
COMMUNITY
Start: 2022-03-15

## 2023-06-22 RX ORDER — CLONIDINE HYDROCHLORIDE 0.2 MG/1
TABLET ORAL
COMMUNITY

## 2023-06-22 RX ORDER — KETOCONAZOLE 20 MG/G
CREAM TOPICAL
COMMUNITY
Start: 2021-05-28

## 2023-06-22 RX ORDER — FAMOTIDINE 40 MG/1
TABLET, FILM COATED ORAL
COMMUNITY
Start: 2023-06-08

## 2023-06-22 RX ORDER — SUCRALFATE 1 G/1
TABLET ORAL
COMMUNITY
Start: 2023-06-01

## 2023-06-22 RX ORDER — TIZANIDINE 4 MG/1
TABLET ORAL
COMMUNITY
Start: 2023-05-22

## 2023-06-22 RX ORDER — LEVOCETIRIZINE DIHYDROCHLORIDE 5 MG/1
1 TABLET, FILM COATED ORAL EVERY EVENING
COMMUNITY
Start: 2022-09-07

## 2023-06-22 RX ORDER — ESCITALOPRAM OXALATE 20 MG/1
TABLET ORAL
COMMUNITY
Start: 2023-06-06

## 2023-06-22 RX ORDER — MECLIZINE HYDROCHLORIDE 25 MG/1
TABLET ORAL
COMMUNITY
Start: 2023-04-01

## 2023-06-22 RX ORDER — BACLOFEN 10 MG/1
10 TABLET ORAL DAILY
COMMUNITY
Start: 2022-05-02 | End: 2023-06-22

## 2023-06-22 RX ORDER — FUROSEMIDE 40 MG/1
TABLET ORAL
COMMUNITY
Start: 2023-05-17 | End: 2023-06-22

## 2023-06-22 RX ORDER — CYCLOBENZAPRINE HCL 5 MG
5 TABLET ORAL 2 TIMES DAILY PRN
Qty: 60 TABLET | Refills: 0 | Status: SHIPPED | OUTPATIENT
Start: 2023-06-22 | End: 2023-07-22

## 2023-06-22 RX ORDER — ANTIOX #8/OM3/DHA/EPA/LUT/ZEAX 250-2.5 MG
1 CAPSULE ORAL 2 TIMES DAILY
COMMUNITY
Start: 2021-01-22

## 2023-06-22 RX ORDER — EPINEPHRINE 0.3 MG/.3ML
INJECTION SUBCUTANEOUS
COMMUNITY
Start: 2019-09-19

## 2023-06-22 RX ORDER — TERBINAFINE HYDROCHLORIDE 250 MG/1
TABLET ORAL
COMMUNITY
Start: 2021-05-28

## 2023-06-22 RX ORDER — ALBUTEROL SULFATE 2.5 MG/3ML
SOLUTION RESPIRATORY (INHALATION)
COMMUNITY
Start: 2019-01-10

## 2023-06-22 RX ORDER — LOSARTAN POTASSIUM 100 MG/1
TABLET ORAL
COMMUNITY
Start: 2023-05-25

## 2023-06-22 NOTE — PROGRESS NOTES
[facility] Neurology Office Note      Patient:   Donn Barakat  MR#:    [id number]  Account Number:                         YOB: 1950  Date of Evaluation:  6/22/2023  Time of Note:                          3:47 PM  Primary/Referring Physician:  Dr. Chapis Roland M.D., MD   Consulting Physician:  Shaquille Welch    Chief Complaint   Patient presents with    New Patient     Headache concussion and diplopia       HISTORY OF PRESENT ILLNESS    Donn Barakat is a 71y.o. year old female here for evaluation of multiple complaints. She had a fall in late February, relates she stepped out on the back court because she thought she heard some in the garage, states she does not remember this occurring.  states he had this happen and that she was laying outside unresponsive. No GTC activity. She was taken to ER and transported to [facility] due to left frontal subdural hemorrhage. Since this occurred she has been having ongoing left temporal pain and tenderness, dizziness, memory loss, intermittent tinnitus, neck pain, falls. She does have prior history of lumbar surgeries and chronic back pain and imbalance issues. She states memory loss is ongoing, mostly short-term. She is losing items throughout the house. She has chronic neck pain as well, states this is worsened since fall. Question possible cervical dystonia. Follows with pain management. Denies knowing of any new imaging being completed of cervical spine. Does have prior migraine history. Denies migrainous symptoms with current headaches. There is also component of tenderness to left temporal head. There are perhaps changes to her mood. She is also experiencing issues with sleep. States she will wake in the middle of the night and is unable to go back to sleep. She also suffered nondisplaced fracture of the base of first metacarpal of right hand following fall.   While she was inpatient at

## 2023-06-22 NOTE — TELEPHONE ENCOUNTER
Called patient back to let them know of the day and time of her EEG. Patient left office before scheduling would answer to get it scheduled. I informed them I would call them and then send the info thru mail. Mailed info to address on file.

## 2023-06-23 ENCOUNTER — CLINICAL DOCUMENTATION (OUTPATIENT)
Dept: NEUROLOGY | Age: 73
End: 2023-06-23

## 2023-06-23 ENCOUNTER — TELEPHONE (OUTPATIENT)
Dept: NEUROSURGERY | Age: 73
End: 2023-06-23

## 2023-06-23 ENCOUNTER — TELEPHONE (OUTPATIENT)
Dept: NEUROLOGY | Age: 73
End: 2023-06-23

## 2023-06-23 NOTE — TELEPHONE ENCOUNTER
Due to mild JUNG and ACD  Followed by Dr. Mak for CAPS   Iv and po iron and procrit per oncology  B12 supplement   Procedures: MRI Cervical     [place] [facility] [place]  Secondary Coverage Requires Auth: Melany Tor[place] Medicaid     Source:   Case/Tracking/Auth Ref # [employer] Pending Case # [id number]     Case Status: Pending     Valid Dates if approved:      Additional Comments:

## 2023-06-23 NOTE — TELEPHONE ENCOUNTER
Called patient for NP referral. She stated that she did not want to see BW, she wanted to see Nayeli Nrei. I stated that Keren Quintanilla was booked until September and we wanted her to be taken care of sooner due to her multiple issues. She stated the BW did not answer her questions and she thinks that BW was not thorough. TAVO sent an hour in the room with the patient. I apologized that she felt that way. She stated that if  thinks she needs to see NS, she does not want to and refused to schedule with me.

## 2023-06-23 NOTE — PROGRESS NOTES
Procedures: MRI Brain    Primary Coverage: [employer]  Secondary Coverage Requires Auth: [place] [employer]    Source: Dede Tomy  Case/Tracking/Auth Ref #  [place]- [id number]  Aishwarya Varsha- [id number]  Case Status: Approved    Valid Dates if approved: 6/23/23 - 7/23/23 - [facility]  Medicaid- 6/23/23-8/22/23    Additional Comments: faxed to Bonny Michelle

## 2023-06-29 DIAGNOSIS — I62.00 SUBDURAL HEMORRHAGE (HCC): ICD-10-CM

## 2023-06-29 DIAGNOSIS — S06.9X1D TRAUMATIC BRAIN INJURY, WITH LOSS OF CONSCIOUSNESS OF 30 MINUTES OR LESS, SUBSEQUENT ENCOUNTER: Primary | ICD-10-CM
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
## 2023-06-29 DIAGNOSIS — R26.89 IMBALANCE: ICD-10-CM

## 2023-06-30 ENCOUNTER — TELEPHONE (OUTPATIENT)
Dept: NEUROLOGY | Age: 73
End: 2023-06-30

## 2023-06-30 NOTE — TELEPHONE ENCOUNTER
Shivam Richards  called  from [facility] . ([phone])    Reason for call: Shivam Richards stated that this patient's referral for [facility], PT is out of their service area. Shiavm Richards has suggested [facility], [facility], [facility], or [facility] in [facility].         Thank you
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
## 2023-07-03 DIAGNOSIS — M54.2 NECK PAIN: ICD-10-CM

## 2023-07-03 DIAGNOSIS — S06.9X1D TRAUMATIC BRAIN INJURY, WITH LOSS OF CONSCIOUSNESS OF 30 MINUTES OR LESS, SUBSEQUENT ENCOUNTER: Primary | ICD-10-CM

## 2023-07-03 NOTE — PROGRESS NOTES
Fátimadionna Rios  called  from [facility] . ([phone])     Reason for call: Sarah Rios stated that this patient's referral for [facility],  is out of their service area. Sarah Rios has suggested [facility], [facility], [facility] health [facility], or [facility] in [facility].

## 2023-07-05 ENCOUNTER — TELEPHONE (OUTPATIENT)
Dept: NEUROLOGY | Age: 73
End: 2023-07-05

## 2023-07-05 NOTE — TELEPHONE ENCOUNTER
Left voicemail for patient to return my call regarding her MRI Cervical Spine that has been denied with her secondary insurance [facility]). Need to know if patient would still like to have MRI Cervical done with only her primary insurance paying.  Left call back number [phone]

## 2023-07-06 DIAGNOSIS — M54.2 NECK PAIN: Primary | ICD-10-CM
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
## 2023-07-11 ENCOUNTER — TELEPHONE (OUTPATIENT)
Dept: NEUROLOGY | Age: 73
End: 2023-07-11

## 2023-07-11 NOTE — TELEPHONE ENCOUNTER
Patient called office voicing that MRI and x-ray are scheduled for 7/12/2023 patient asked about disk I advised that patient would need to have them fax over reports and burn a disk for her to bring to appointment. Patient also voiced that she missed her EEG appointment but will not be getting this done due to not being able to pay for it. Patient also voiced that \" is having a jumping fit due to having to wait until September for next appointment. \" I advised that testing would need to be completed first. I advised patient that once we received results BW would look over everything and I would get back with her. She voiced her understanding and had no other questions at this time.

## 2023-07-12 NOTE — TELEPHONE ENCOUNTER
Lee  called stating that she got mri of brain completed today, and hospital called and advised that she needed MRI of cervical spine as well. Per pt PA was approved, and she has the letter. Pt also would like to be seen in office due to pain before sept. States she is confused and needs to know what needs to be schedule. Please call pt to advise.

## 2023-07-13 NOTE — TELEPHONE ENCOUNTER
[facility] for patient to return my call regarding letter she may have received from her insurance regarding approval for MRI Cervical.   I spoke with patient on 7/5/2023 to let her know that MRI Cervical was approved with her primary insurance and her secondary denied the request.     I left my call back number [phone]

## 2023-07-17 ENCOUNTER — TELEPHONE (OUTPATIENT)
Dept: NEUROLOGY | Age: 73
End: 2023-07-17

## 2023-07-17 NOTE — TELEPHONE ENCOUNTER
Vicky Gupta with [facility] called office requesting return call from me (Vicente Demarco). I returned call to Vicky Gupta. She voiced that since [employer] was a secondary insurance and [employer] has already approved MRI Cervical spine that an auth from them is not needed. Vicky Gupta provided me with reference number [id number]. I spoke with [facility] referral coordinator I relayed this information to her at this time. She voiced that order would be faxed to [address] [place] [address].

## 2023-07-24 ENCOUNTER — TELEPHONE (OUTPATIENT)
Dept: NEUROSURGERY | Age: 73
End: 2023-07-24

## 2023-07-24 NOTE — TELEPHONE ENCOUNTER
Patient is having MRI today at [facility] but Casey Handley  yesterday. New auth needed for MRI to be completed today. Please call [phone] and ask for scheduling if any questions.

## 2023-07-24 NOTE — TELEPHONE ENCOUNTER
Patient called requesting PA for MRI of head and neck be submitted for [facility]. Patient requesting call placed to [facility] and speak with Tiff Cordova.

## 2023-07-24 NOTE — TELEPHONE ENCOUNTER
Patient has been contacted to let her know that I have faxed order to [facility] with Auth and New Valid dates.     New order with updated authorization has been faxed to [facility]  Auth# [id number]  Valid 7/24/23-8/23/23

## 2023-07-31 DIAGNOSIS — S06.9X1D TRAUMATIC BRAIN INJURY, WITH LOSS OF CONSCIOUSNESS OF 30 MINUTES OR LESS, SUBSEQUENT ENCOUNTER: ICD-10-CM

## 2023-07-31 DIAGNOSIS — M54.2 NECK PAIN: Primary | ICD-10-CM

## 2023-08-29 ENCOUNTER — TELEPHONE (OUTPATIENT)
Dept: NEUROLOGY | Age: 73
End: 2023-08-29

## 2023-08-29 NOTE — TELEPHONE ENCOUNTER
Patient called left a  asking for a return all back please regarding her upcoming appt. Please return her call @ [phone] or [phone] she would greatly appreciate a return call   Thank you!

## 2023-08-30 NOTE — TELEPHONE ENCOUNTER
Patient called office. Patient wanted to know what she needed for her upcoming appointment. Patient was advised by Rodri Linder MA that she would need to bring disk with MRI images to her appointment. She voiced her understanding.

## 2023-09-13 ENCOUNTER — CLINICAL DOCUMENTATION (OUTPATIENT)
Dept: NEUROLOGY | Age: 73
End: 2023-09-13

## 2023-09-13 NOTE — PROGRESS NOTES
MRI cervical spine reviewed with multilevel DDD with only minimal to mild canal stenosis identified, no critical neural compression. MRI brain reviewed, previously left subdural hematoma in the frontotemporal region has resolved. Moderate chronic changes with no evidence of subdural hematoma. Small focal area of bright diffusion signal in the right temporal region, most consistent with T2 shine through.

## 2023-10-31 ENCOUNTER — TELEPHONE (OUTPATIENT)
Dept: NEUROLOGY | Age: 73
End: 2023-10-31

## 2023-10-31 NOTE — TELEPHONE ENCOUNTER
Patient called related to appointment with Dr. Silvio Patel appointment on 11/27/2023. Patient is not sure of appointment time. Patient requesting a call back.

## 2023-10-31 NOTE — TELEPHONE ENCOUNTER
Attempted to return patients call. No answer at this time. Left voicemail with my name and number to return call.

## 2024-03-12 ENCOUNTER — TELEPHONE (OUTPATIENT)
Dept: NEUROLOGY | Age: 74
End: 2024-03-12

## 2024-03-12 NOTE — TELEPHONE ENCOUNTER
The pt called to see if she can be seen any sooner.  The pt stated that she is falling frequently.

## 2024-03-21 NOTE — TELEPHONE ENCOUNTER
Patient has next available scheduled. Please add to your wait list. If ok we could see if Sully would see patient again.

## 2025-02-11 RX ORDER — MIRTAZAPINE 30 MG/1
30 TABLET, FILM COATED ORAL NIGHTLY
COMMUNITY
End: 2025-02-12

## 2025-02-11 RX ORDER — FUROSEMIDE 40 MG/1
40 TABLET ORAL 2 TIMES DAILY
COMMUNITY

## 2025-02-11 RX ORDER — MELOXICAM 7.5 MG/1
7.5 TABLET ORAL DAILY
COMMUNITY
End: 2025-02-12

## 2025-02-11 RX ORDER — FEXOFENADINE HCL 180 MG/1
180 TABLET ORAL DAILY
COMMUNITY

## 2025-02-11 RX ORDER — PANTOPRAZOLE SODIUM 40 MG/1
40 TABLET, DELAYED RELEASE ORAL DAILY
COMMUNITY

## 2025-02-11 RX ORDER — FLUCONAZOLE 100 MG/1
100 TABLET ORAL DAILY
COMMUNITY
End: 2025-02-12

## 2025-02-11 RX ORDER — PSYLLIUM HUSK 0.4 G
CAPSULE ORAL PRN
COMMUNITY
End: 2025-02-12

## 2025-02-12 ENCOUNTER — OFFICE VISIT (OUTPATIENT)
Dept: GASTROENTEROLOGY | Age: 75
End: 2025-02-12
Payer: MEDICARE

## 2025-02-12 VITALS
HEART RATE: 77 BPM | OXYGEN SATURATION: 93 % | SYSTOLIC BLOOD PRESSURE: 128 MMHG | BODY MASS INDEX: 28.66 KG/M2 | HEIGHT: 60 IN | WEIGHT: 146 LBS | DIASTOLIC BLOOD PRESSURE: 78 MMHG

## 2025-02-12 DIAGNOSIS — K59.00 CONSTIPATION, UNSPECIFIED CONSTIPATION TYPE: ICD-10-CM

## 2025-02-12 DIAGNOSIS — R11.2 NAUSEA AND VOMITING, UNSPECIFIED VOMITING TYPE: ICD-10-CM

## 2025-02-12 DIAGNOSIS — R14.0 BLOATING: ICD-10-CM

## 2025-02-12 DIAGNOSIS — K62.5 RECTAL BLEEDING: ICD-10-CM

## 2025-02-12 DIAGNOSIS — R10.84 GENERALIZED ABDOMINAL PAIN: Primary | ICD-10-CM

## 2025-02-12 DIAGNOSIS — K21.9 GASTROESOPHAGEAL REFLUX DISEASE, UNSPECIFIED WHETHER ESOPHAGITIS PRESENT: ICD-10-CM

## 2025-02-12 PROCEDURE — 1123F ACP DISCUSS/DSCN MKR DOCD: CPT | Performed by: NURSE PRACTITIONER

## 2025-02-12 PROCEDURE — 1159F MED LIST DOCD IN RCRD: CPT | Performed by: NURSE PRACTITIONER

## 2025-02-12 PROCEDURE — 99204 OFFICE O/P NEW MOD 45 MIN: CPT | Performed by: NURSE PRACTITIONER

## 2025-02-12 RX ORDER — HYOSCYAMINE SULFATE 0.12 MG/1
0.12 TABLET SUBLINGUAL EVERY 4 HOURS PRN
Qty: 90 TABLET | Refills: 3 | Status: SHIPPED | OUTPATIENT
Start: 2025-02-12 | End: 2025-02-20

## 2025-02-12 RX ORDER — SUCRALFATE 1 G/1
1 TABLET ORAL 2 TIMES DAILY
Qty: 120 TABLET | Refills: 3 | Status: SHIPPED | OUTPATIENT
Start: 2025-02-12

## 2025-02-12 NOTE — PROGRESS NOTES
Subjective:     Patient ID: Felisa Flores is a 74 y.o. female  PCP: Trino Amado MD  Referring Provider: Tirno Amado MD    HPI  Patient presents to the office today with the following complaints: New Patient and Other (FORABD PAIN)      Patient seen in the office today with complaints of abd pain  She states 3 weeks ago she had  \"terrible abd pain\" and diarrhea     She has been seeing Dr. Rebolledo and states Dr. Rebolledo has her taking mag citrate every 4 days if she does not have a bowel movement   She is also taking Linzess 145 mcg daily   Reports some rectal bleeding of bright red blood at times   Rates her pain at a about 5/10      30 years ago she had a perforated colon, she states this was repaired and no colon was removed     4/2024 had surgery for lysis of adhesions     States she is experiencing upper abd pain as well that increases with eating   She is having nausea and vomiting, bloating and acid reflux     Assessment:     1. Generalized abdominal pain  2. Rectal bleeding  3. Constipation, unspecified constipation type  4. Nausea and vomiting, unspecified vomiting type  5. Gastroesophageal reflux disease, unspecified whether esophagitis present  6. Bloating       Review of Systems   Constitutional:  Negative for activity change, appetite change, fatigue, fever and unexpected weight change.   HENT:  Negative for trouble swallowing.    Respiratory:  Negative for cough, choking and shortness of breath.    Cardiovascular:  Negative for chest pain.   Gastrointestinal:  Positive for abdominal pain, constipation, nausea and vomiting. Negative for abdominal distention, anal bleeding, blood in stool, diarrhea and rectal pain.   Allergic/Immunologic: Negative for food allergies.   All other systems reviewed and are negative.      Plan:   Start carafate and Levsin RX's provided  I have requested a copy of CT abd done at [facility]   Schedule Colonoscopy and EGD  Instruct on bowel prep.   Nothing to

## 2025-02-14 ENCOUNTER — TELEPHONE (OUTPATIENT)
Dept: GASTROENTEROLOGY | Age: 75
End: 2025-02-14

## 2025-02-14 NOTE — TELEPHONE ENCOUNTER
02- Patient called [facility] that she seen CT APRN on 02-.  Complains of horrible abdominal pain/hurting.  Mentions that she was given the prep for Colon & EGD but that its not scheduled til March 16th.  The pills that go under her tongue (Hyoscyamine) aren't seeming to help.     Called patient-call could not be completed at this time. X 3   Called back stated that the number is not in service. X 2     Found cell number  [phone] on referral from Dr Aneudy NIETO for patient to return a call to the office   Called Home/mobile number not in service       Routed to CT APRN   Unable to return call to discuss her horrible abdominal pain/hurting-called 6 times phone number not in service.  Did find cell number from PCP referral and called and [facility] for them to call the office         Discussed with CT APRN recommend that the patient proceed to the ER.     Advised that I would try calling the patient later.   Called Home number spoke with the patient  Explained that I have called several times and either her phone said call couldn't be completed at this time or that her number was disconnected.   She stated that it was like it was on the 12th-pain scale 8-10.  Advised that she proceed to the ER.  She stated that she would just hold off right now and that the Hyoscamine tablets do seem to help and that with the medication her pain is an 8 but that it eases it some.     Advised that upon speaking with CT APRN earlier recommended that if the pain is that bad then it would be recommended to proceed to the ER. She stated that she will just wait til her EGD & Colon in March.        She stated that CT APRN told her that she could send something else in besides the Hyoscyamine.       She denied any other symptoms no temp, vomiting, nausea or temperature. Questioned as to where the pain was located-patient stated that it is jumping around dae-its more upper.     Advised will send message but usually if patients pain is

## 2025-02-19 ASSESSMENT — ENCOUNTER SYMPTOMS
ANAL BLEEDING: 0
ABDOMINAL DISTENTION: 0
NAUSEA: 1
TROUBLE SWALLOWING: 0
VOMITING: 1
RECTAL PAIN: 0
SHORTNESS OF BREATH: 0
CHOKING: 0
CONSTIPATION: 1
BLOOD IN STOOL: 0
DIARRHEA: 0
ABDOMINAL PAIN: 1
COUGH: 0

## 2025-02-20 RX ORDER — HYOSCYAMINE SULFATE 0.12 MG/1
0.12 TABLET SUBLINGUAL EVERY 4 HOURS PRN
Qty: 540 TABLET | Refills: 1 | Status: SHIPPED | OUTPATIENT
Start: 2025-02-20

## 2025-02-24 ENCOUNTER — TELEPHONE (OUTPATIENT)
Dept: GASTROENTEROLOGY | Age: 75
End: 2025-02-24

## 2025-03-06 ENCOUNTER — TELEPHONE (OUTPATIENT)
Dept: GASTROENTEROLOGY | Age: 75
End: 2025-03-06

## 2025-03-06 ENCOUNTER — APPOINTMENT (OUTPATIENT)
Dept: CT IMAGING | Age: 75
End: 2025-03-06
Payer: MEDICARE

## 2025-03-06 ENCOUNTER — HOSPITAL ENCOUNTER (EMERGENCY)
Age: 75
Discharge: HOME | End: 2025-03-06
Attending: EMERGENCY MEDICINE
Payer: MEDICARE

## 2025-03-06 VITALS
SYSTOLIC BLOOD PRESSURE: 172 MMHG | DIASTOLIC BLOOD PRESSURE: 72 MMHG | OXYGEN SATURATION: 92 % | RESPIRATION RATE: 17 BRPM | HEART RATE: 67 BPM | TEMPERATURE: 98.1 F | BODY MASS INDEX: 28.9 KG/M2 | WEIGHT: 148 LBS

## 2025-03-06 DIAGNOSIS — R10.9 ABDOMINAL PAIN, UNSPECIFIED ABDOMINAL LOCATION: Primary | ICD-10-CM

## 2025-03-06 LAB
ALBUMIN SERPL-MCNC: 3.7 G/DL (ref 3.5–5.2)
ALP SERPL-CCNC: 88 U/L (ref 35–104)
ALT SERPL-CCNC: 7 U/L (ref 10–35)
ANION GAP SERPL CALCULATED.3IONS-SCNC: 9 MMOL/L (ref 8–16)
AST SERPL-CCNC: 17 U/L (ref 10–35)
BASOPHILS # BLD: 0.1 K/UL (ref 0–0.2)
BASOPHILS NFR BLD: 0.8 % (ref 0–1)
BILIRUB SERPL-MCNC: 0.2 MG/DL (ref 0.2–1.2)
BILIRUB UR QL STRIP: NEGATIVE
BUN SERPL-MCNC: 14 MG/DL (ref 8–23)
CALCIUM SERPL-MCNC: 9.2 MG/DL (ref 8.8–10.2)
CHLORIDE SERPL-SCNC: 103 MMOL/L (ref 98–107)
CLARITY UR: CLEAR
CO2 SERPL-SCNC: 28 MMOL/L (ref 22–29)
COLOR UR: YELLOW
CREAT SERPL-MCNC: 0.8 MG/DL (ref 0.5–0.9)
EOSINOPHIL # BLD: 0.2 K/UL (ref 0–0.6)
EOSINOPHIL NFR BLD: 2.7 % (ref 0–5)
ERYTHROCYTE [DISTWIDTH] IN BLOOD BY AUTOMATED COUNT: 12.8 % (ref 11.5–14.5)
GLUCOSE SERPL-MCNC: 88 MG/DL (ref 70–99)
GLUCOSE UR STRIP.AUTO-MCNC: NEGATIVE MG/DL
HCT VFR BLD AUTO: 40 % (ref 37–47)
HGB BLD-MCNC: 13.1 G/DL (ref 12–16)
HGB UR STRIP.AUTO-MCNC: NEGATIVE MG/L
IMM GRANULOCYTES # BLD: 0 K/UL
KETONES UR STRIP.AUTO-MCNC: NEGATIVE MG/DL
LEUKOCYTE ESTERASE UR QL STRIP.AUTO: NEGATIVE
LIPASE SERPL-CCNC: 26 U/L (ref 13–60)
LYMPHOCYTES # BLD: 2 K/UL (ref 1.1–4.5)
LYMPHOCYTES NFR BLD: 31.1 % (ref 20–40)
MCH RBC QN AUTO: 32.1 PG (ref 27–31)
MCHC RBC AUTO-ENTMCNC: 32.8 G/DL (ref 33–37)
MCV RBC AUTO: 98 FL (ref 81–99)
MONOCYTES # BLD: 0.7 K/UL (ref 0–0.9)
MONOCYTES NFR BLD: 11.1 % (ref 0–10)
NEUTROPHILS # BLD: 3.5 K/UL (ref 1.5–7.5)
NEUTS SEG NFR BLD: 54 % (ref 50–65)
NITRITE UR QL STRIP.AUTO: NEGATIVE
PH UR STRIP.AUTO: 7.5 [PH] (ref 5–8)
PLATELET # BLD AUTO: 271 K/UL (ref 130–400)
PMV BLD AUTO: 10.7 FL (ref 9.4–12.3)
POTASSIUM SERPL-SCNC: 4 MMOL/L (ref 3.5–5.1)
PROT SERPL-MCNC: 7.1 G/DL (ref 6.4–8.3)
PROT UR STRIP.AUTO-MCNC: NEGATIVE MG/DL
RBC # BLD AUTO: 4.08 M/UL (ref 4.2–5.4)
SODIUM SERPL-SCNC: 140 MMOL/L (ref 136–145)
SP GR UR STRIP.AUTO: 1 (ref 1–1.03)
UROBILINOGEN UR STRIP.AUTO-MCNC: 0.2 E.U./DL
WBC # BLD AUTO: 6.6 K/UL (ref 4.8–10.8)

## 2025-03-06 PROCEDURE — 6360000002 HC RX W HCPCS: Performed by: EMERGENCY MEDICINE

## 2025-03-06 PROCEDURE — 99285 EMERGENCY DEPT VISIT HI MDM: CPT

## 2025-03-06 PROCEDURE — 96374 THER/PROPH/DIAG INJ IV PUSH: CPT

## 2025-03-06 PROCEDURE — 80053 COMPREHEN METABOLIC PANEL: CPT

## 2025-03-06 PROCEDURE — 81003 URINALYSIS AUTO W/O SCOPE: CPT

## 2025-03-06 PROCEDURE — 6360000004 HC RX CONTRAST MEDICATION: Performed by: EMERGENCY MEDICINE

## 2025-03-06 PROCEDURE — 83690 ASSAY OF LIPASE: CPT

## 2025-03-06 PROCEDURE — 85025 COMPLETE CBC W/AUTO DIFF WBC: CPT

## 2025-03-06 PROCEDURE — 74177 CT ABD & PELVIS W/CONTRAST: CPT

## 2025-03-06 PROCEDURE — 36415 COLL VENOUS BLD VENIPUNCTURE: CPT

## 2025-03-06 PROCEDURE — 96375 TX/PRO/DX INJ NEW DRUG ADDON: CPT

## 2025-03-06 RX ORDER — ONDANSETRON 2 MG/ML
4 INJECTION INTRAMUSCULAR; INTRAVENOUS ONCE
Status: COMPLETED | OUTPATIENT
Start: 2025-03-06 | End: 2025-03-06

## 2025-03-06 RX ORDER — MORPHINE SULFATE 4 MG/ML
4 INJECTION, SOLUTION INTRAMUSCULAR; INTRAVENOUS ONCE
Status: COMPLETED | OUTPATIENT
Start: 2025-03-06 | End: 2025-03-06

## 2025-03-06 RX ORDER — KETOROLAC TROMETHAMINE 30 MG/ML
15 INJECTION, SOLUTION INTRAMUSCULAR; INTRAVENOUS ONCE
Status: COMPLETED | OUTPATIENT
Start: 2025-03-06 | End: 2025-03-06

## 2025-03-06 RX ORDER — IOPAMIDOL 755 MG/ML
70 INJECTION, SOLUTION INTRAVASCULAR
Status: COMPLETED | OUTPATIENT
Start: 2025-03-06 | End: 2025-03-06

## 2025-03-06 RX ADMIN — KETOROLAC TROMETHAMINE 15 MG: 30 INJECTION, SOLUTION INTRAMUSCULAR at 20:40

## 2025-03-06 RX ADMIN — MORPHINE SULFATE 4 MG: 4 INJECTION, SOLUTION INTRAMUSCULAR; INTRAVENOUS at 18:54

## 2025-03-06 RX ADMIN — ONDANSETRON 4 MG: 2 INJECTION, SOLUTION INTRAMUSCULAR; INTRAVENOUS at 18:54

## 2025-03-06 RX ADMIN — IOPAMIDOL 70 ML: 755 INJECTION, SOLUTION INTRAVENOUS at 19:09

## 2025-03-06 ASSESSMENT — ENCOUNTER SYMPTOMS
ABDOMINAL PAIN: 1
DIARRHEA: 0
VOMITING: 0
SHORTNESS OF BREATH: 0
RHINORRHEA: 0
COUGH: 0
CONSTIPATION: 1
BACK PAIN: 0
SORE THROAT: 0
NAUSEA: 1

## 2025-03-06 ASSESSMENT — PAIN SCALES - GENERAL: PAINLEVEL_OUTOF10: 8

## 2025-03-06 NOTE — TELEPHONE ENCOUNTER
03-  Called the patient   Perforation was about 30 years ago.     She stated that she is in severe pain 9 out of 10.     She went to [facility] ER this am.  She stated that they sent her home to call Gastro.      Explained to the patient that I received the message that Dagoberto Philipr offered to move up her EGD/Colon but patient wanted to talk about Medications and possible other testing.      Explained to the patient that if she is in severe pain that she needed to go to ER-She stated that she did and they advised her to call Gastro.  Recommended since [facility] didn't do anything the she might need to proceed to [facility].     Questioned what would they do?  Explained that [facility] has Gastro on call and if needed then they can do the needed labs and testing.       She stated that she has had 7 people tell her not to have the colon done as that it will rupture her colon.  Questioned if she discussed with with KAMERON URIBE at her visit.  She stated that she did and Beatrice URIBE recommended EGD & colon.  Stated that she is afraid of the colon.      Explained that Dagoberto  spoke with her earlier and offered to move up her procedure-She stated that no once has offered to move up her procedure.    Questioned what she would like for us to do?  She stated that she was told to call  Gastro.  Explained once again that due to her being in severe pain that there wasn't anything that we can do in the office and that we are recommending that she proceed to [facility].      Explained that Dagoberto lisa  per her note-offered to move up her procedures but that patient wanted to talk about medications and poss other testing.  Questioned what medication or testing she would like to discuss?  She stated that no one has called and that she would like to get the EGD sooner.     Explained that I would send her message to CT RONY and Dagoberto  for their recommendations.     Routed to Geri

## 2025-03-06 NOTE — TELEPHONE ENCOUNTER
Called and spoke with the patient. She was explaining the pain and the discomfort she is in. She is very worried about having a cln done due to seen different providers telling her not to have the cln done. I explained to her if she is having so much pain that she needs to be seen in the ER so they can at least try to run some type of testing and help her figure out what is causing her so much pain. She did state that if the pain doesn't get any better before the night then she will go to the ER

## 2025-03-06 NOTE — TELEPHONE ENCOUNTER
Patient called today, she continue's to be in pain - she did go to the ER in [facility] last month when she called.  She saw Beatrice on 2/12/25 - she is scheduled for EGD/CL with Dr. GONZALEZ on 3/18.  Beatrice gave her medication but she continues to be in pain.   The ER said she needs to have the EGD/CLN.  But she has had a doctor tell her that she should never have a colonoscopy because of a perforation she had during the colonoscopy.  I offered to move her procedures up, but she wants to talk about meds or maybe other tests.

## 2025-03-07 NOTE — ED PROVIDER NOTES
LIPASE   URINALYSIS WITH REFLEX TO CULTURE       All other labs were within normal range or not returned as of this dictation.    EMERGENCY DEPARTMENT COURSE and DIFFERENTIAL DIAGNOSIS/MDM:   Vitals:    Vitals:    03/06/25 1830 03/06/25 1900 03/06/25 2015 03/06/25 2030   BP: (!) 163/73   (!) 172/72   Pulse:  76 64 67   Resp:  15 10 17   Temp:       TempSrc:       SpO2: 94% 94% 95% 92%   Weight:           MDM     Amount and/or Complexity of Data Reviewed  Clinical lab tests: ordered and reviewed  Tests in the radiology section of CPT®: ordered and reviewed  Independent visualization of images, tracings, or specimens: yes      Vital signs stable afebrile presents with ongoing abdominal pain over the past 1 month seems deals with somewhat chronic abdominal pain.  Has seen PCP as well as GI and her surgeon from the past and has had intermittent imaging with no obvious diagnosis.  Does have some plan of seeing GI for possible endoscopy.  Laboratory evaluation unremarkable sent for CT given duration of pain and her history also which is unremarkable has some colonic stool measuring 7.2 cm in the right side recommended ongoing constipation treatment to see if helps relieve symptoms.  Given prior abdominal surgeries could have scar tissue adhesions causing some pain as well.  Remains well-appearing here stable for discharge and outpatient follow-up.      CONSULTS:  None    :  Unless otherwise noted below, none     Procedures    FINAL IMPRESSION      1. Abdominal pain, unspecified abdominal location          DISPOSITION/PLAN   DISPOSITION Decision To Discharge 03/06/2025 08:21:49 PM   DISPOSITION CONDITION Stable           PATIENT REFERRED TO:  Trino Amado MD  [address] [facility] [address] [phone]    Schedule an appointment as soon as possible for a visit       [facility] Department  [facility] [facility] [address] [phone]    As needed, If symptoms worsen      DISCHARGE

## 2025-03-11 ENCOUNTER — HOSPITAL ENCOUNTER (OUTPATIENT)
Age: 75
Setting detail: OUTPATIENT SURGERY
Discharge: HOME | End: 2025-03-11
Attending: INTERNAL MEDICINE | Admitting: INTERNAL MEDICINE
Payer: MEDICAID

## 2025-03-11 ENCOUNTER — TELEPHONE (OUTPATIENT)
Dept: GASTROENTEROLOGY | Age: 75
End: 2025-03-11

## 2025-03-11 ENCOUNTER — HOSPITAL ENCOUNTER (OUTPATIENT)
Age: 75
Setting detail: SPECIMEN
Discharge: HOME | End: 2025-03-11
Payer: MEDICAID

## 2025-03-11 ENCOUNTER — ANESTHESIA (OUTPATIENT)
Dept: OPERATING ROOM | Age: 75
End: 2025-03-11

## 2025-03-11 ENCOUNTER — ANESTHESIA EVENT (OUTPATIENT)
Dept: OPERATING ROOM | Age: 75
End: 2025-03-11

## 2025-03-11 ENCOUNTER — APPOINTMENT (OUTPATIENT)
Dept: OPERATING ROOM | Age: 75
End: 2025-03-11
Attending: INTERNAL MEDICINE

## 2025-03-11 VITALS
DIASTOLIC BLOOD PRESSURE: 73 MMHG | TEMPERATURE: 98.5 F | OXYGEN SATURATION: 97 % | HEART RATE: 74 BPM | HEIGHT: 60 IN | RESPIRATION RATE: 18 BRPM | WEIGHT: 146 LBS | SYSTOLIC BLOOD PRESSURE: 174 MMHG | BODY MASS INDEX: 28.66 KG/M2

## 2025-03-11 PROCEDURE — G8907 PT DOC NO EVENTS ON DISCHARG: HCPCS

## 2025-03-11 PROCEDURE — G8918 PT W/O PREOP ORDER IV AB PRO: HCPCS

## 2025-03-11 PROCEDURE — 43239 EGD BIOPSY SINGLE/MULTIPLE: CPT

## 2025-03-11 PROCEDURE — 88305 TISSUE EXAM BY PATHOLOGIST: CPT

## 2025-03-11 PROCEDURE — 43239 EGD BIOPSY SINGLE/MULTIPLE: CPT | Performed by: INTERNAL MEDICINE

## 2025-03-11 RX ORDER — PROPOFOL 10 MG/ML
INJECTION, EMULSION INTRAVENOUS
Status: DISCONTINUED | OUTPATIENT
Start: 2025-03-11 | End: 2025-03-11 | Stop reason: SDUPTHER

## 2025-03-11 RX ORDER — SODIUM CHLORIDE, SODIUM LACTATE, POTASSIUM CHLORIDE, CALCIUM CHLORIDE 600; 310; 30; 20 MG/100ML; MG/100ML; MG/100ML; MG/100ML
INJECTION, SOLUTION INTRAVENOUS CONTINUOUS
Status: DISCONTINUED | OUTPATIENT
Start: 2025-03-11 | End: 2025-03-11 | Stop reason: HOSPADM

## 2025-03-11 RX ORDER — LIDOCAINE HYDROCHLORIDE 10 MG/ML
INJECTION, SOLUTION INFILTRATION; PERINEURAL
Status: DISCONTINUED | OUTPATIENT
Start: 2025-03-11 | End: 2025-03-11 | Stop reason: SDUPTHER

## 2025-03-11 RX ORDER — SODIUM CHLORIDE 9 MG/ML
INJECTION, SOLUTION INTRAVENOUS CONTINUOUS
Status: DISCONTINUED | OUTPATIENT
Start: 2025-03-11 | End: 2025-03-11 | Stop reason: HOSPADM

## 2025-03-11 RX ADMIN — LIDOCAINE HYDROCHLORIDE 100 MG: 10 INJECTION, SOLUTION INFILTRATION; PERINEURAL at 15:03

## 2025-03-11 RX ADMIN — SODIUM CHLORIDE: 9 INJECTION, SOLUTION INTRAVENOUS at 13:31

## 2025-03-11 RX ADMIN — PROPOFOL 20 MG: 10 INJECTION, EMULSION INTRAVENOUS at 15:05

## 2025-03-11 RX ADMIN — PROPOFOL 50 MG: 10 INJECTION, EMULSION INTRAVENOUS at 15:03

## 2025-03-11 ASSESSMENT — PAIN - FUNCTIONAL ASSESSMENT
PAIN_FUNCTIONAL_ASSESSMENT: NONE - DENIES PAIN

## 2025-03-11 NOTE — ANESTHESIA POSTPROCEDURE EVALUATION
Department of Anesthesiology  Postprocedure Note    Patient: Felisa Flores  MRN: [id number]  YOB: 1950  Date of evaluation: 3/11/2025    Procedure Summary       Date: 03/11/25 Room / Location: [place] [address] / [facility]    Anesthesia Start: 1456 Anesthesia Stop: 1514    Procedure: ESOPHAGOGASTRODUODENOSCOPY BIOPSY (Esophagus) Diagnosis:       Epigastric pain      Gastroesophageal reflux disease, unspecified whether esophagitis present      Belching      Constipation, unspecified constipation type      Abdominal pain, unspecified abdominal location      Rectal bleeding      (Epigastric pain [R10.13])      (Gastroesophageal reflux disease, unspecified whether esophagitis present [K21.9])      (Belching [R14.2])      (Constipation, unspecified constipation type [K59.00])      (Abdominal pain, unspecified abdominal location [R10.9])      (Rectal bleeding [K62.5])    Surgeons: Senthil Levin MD Responsible Provider: Adonis Adame APRN - CRNA    Anesthesia Type: general, TIVA ASA Status: 3            Anesthesia Type: No value filed.    Gio Phase I: Gio Score: 10    Gio Phase II:      Anesthesia Post Evaluation    Patient location during evaluation: PACU  Patient participation: complete - patient participated  Level of consciousness: sleepy but conscious  Pain score: 0  Airway patency: patent  Nausea & Vomiting: no nausea and no vomiting  Cardiovascular status: blood pressure returned to baseline  Respiratory status: acceptable  Pain management: adequate        No notable events documented.

## 2025-03-11 NOTE — OP NOTE
Endoscopic Procedure Note    Patient: Felisa Flores : 1950  Med Rec#: [id number] Acc#: [id number]     Primary Care Provider Trino Amado MD    Endoscopist: Senthil Levin MD, MD    Date of Procedure:  3/11/2025    Procedure:   EGD with    Cold biopsies    Indications:     1. Generalized abdominal pain  2. Rectal bleeding  3. Constipation, unspecified constipation type  4. Nausea and vomiting, unspecified vomiting type  5. Gastroesophageal reflux disease, unspecified whether esophagitis present  6. Bloating    Patient was originally scheduled for a colonoscopy and an EGD exam but she has chosen only to undergo an EGD exam at this time.    Anesthesia:  Sedation was administered by anesthesia who monitored the patient during the procedure.    Estimated Blood Loss: minimal    Procedure:   After reviewing the patient's chart and obtaining informed consent, the patient was placed in the left lateral decubitus position.  A forward-viewing Olympus endoscope was lubricated and inserted through the mouth into the oropharynx. Under direct visualization, the upper esophagus was intubated. The scope was advanced to the level of the third portion of duodenum. Scope was slowly withdrawn with careful inspection of the mucosal surfaces. The scope was retroflexed for inspection of the gastric fundus and incisura. Findings and maneuvers are listed in impression below. The patient tolerated the procedure well. The scope was removed. There were no immediate complications.    Findings/IMPRESSION:  Esophagus: abnormal: Normal upper two thirds; short segment Jernigan's Esophagus like changes in the lower third with 1-1.5 cm segments of tongue like projections and small islands of salmon-pink gastric appearing mucosa into the lower third of the esophagus away from the Z line. Cold biopsies were taken to check for intestinal metaplasia      There is no obvious hiatal hernia present.      Stomach:  normal.    NO  ulcers or masses or gastric outlet obstruction or retained food or fluid. Rugae were normal and lumen distended well with insufflation. Retroflexed views otherwise revealed a normal GE junction, fundus and cardia as well.       Duodenum: Normal. Random biopsies were taken to check for Celiac disease and other causes of villous atrophy.      RECOMMENDATIONS:    1.  Await path results, the patient will be contacted in 7-10 days with biopsy results.  If no evidence of dysplasia, repeat EGD in 3 years for surveillance of short segment Jernigan's esophagus like changes noted today  2.  Continue medications for constipation including Linzess along with milk of mag 30 to 60 mL/day with or without MiraLAX 17 g p.o. once or twice daily to be titrated to a desired effect of 1-2 soft stools per day not requiring excessive straining or defecation  3.  If the patient consents, schedule colonoscopy in 3 to 4 weeks with a 2-day clear liquid diet and a 2-day prep using Plenvu or a similar solution assisted by Zofran 4 mg p.o. every 4 hours as needed    - Resume previous meds and diet  - GI clinic f/u 6-8 weeks with MsFeliz Sol   - Keep scheduled f/u appts with other MDs     - NO NSAIDs x 2 weeks     The results were discussed with the patient and family.  A copy of the images obtained were given to the patient.     (Please note that portions of this note were completed with a voice recognition program. Efforts were made to edit the dictations but occasionally words may be mis-transcribed.)     Senthil Levin MD, MD  3/11/2025  3:00 PM

## 2025-03-11 NOTE — H&P
medications    Medication Sig Start Date End Date Taking? Authorizing Provider   hyoscyamine (LEVSIN/SL) 0.125 MG sublingual tablet PLACE 1 TABLET UNDER THE TONGUE EVERY 4 HOURS AS NEEDED FOR CRAMPING. 2/20/25  Yes Beatrice Horton APRN   linaclotide (LINZESS) 145 MCG capsule Take 1 capsule by mouth every morning (before breakfast)   Yes ProviderGeorge MD   sucralfate (CARAFATE) 1 GM tablet Take 1 tablet by mouth in the morning and at bedtime 2/12/25  Yes Beatrice Horton APRN   pantoprazole (PROTONIX) 40 MG tablet Take 1 tablet by mouth daily   Yes Provider, MD George   fexofenadine (ALLEGRA) 180 MG tablet Take 1 tablet by mouth daily   Yes Provider, MD George   furosemide (LASIX) 40 MG tablet Take 1 tablet by mouth in the morning and 1 tablet in the evening.   Yes Provider, MD George   ALPRAZolam (XANAX) 1 MG tablet TAKE 1 TABLET 4 TIMES A DAY AS NEEDED FOR ANXIETY 5/20/23  Yes Provider, MD George   primidone (MYSOLINE) 50 MG tablet  5/22/23  Yes Provider, MD George   albuterol sulfate HFA (PROVENTIL;VENTOLIN;PROAIR) 108 (90 Base) MCG/ACT inhaler Inhale 2 puffs into the lungs every 6 hours as needed   Yes Provider, MD George   ondansetron (ZOFRAN) 4 MG tablet TAKE 1 TABLET 4 TIMES A DAY AS NEEDED 4/2/23  Yes Provider, MD George   potassium chloride (KLOR-CON M) 20 MEQ TBCR extended release tablet TAKE 1 TABLET BY MOUTH EVERY DAY ORAL 90 5/17/23  Yes Provider, MD George   hydrALAZINE (APRESOLINE) 25 MG tablet  5/22/23  Yes Provider, MD George   dicyclomine (BENTYL) 10 MG capsule Take 1 capsule by mouth 4 times daily (before meals and nightly)   Yes Provider, MD George   dilTIAZem HCl  MG TB24 TAKE 1 TABLET BY MOUTH EVERY DAY FOR 90 DAYS 6/14/23  Yes Provider, MD George   metoclopramide (REGLAN) 5 MG tablet metoclopramide 5 mg tablet 7/11/17  Yes ProviderGeorge MD   Multiple Vitamins-Minerals (PRESERVISION AREDS 2) CAPS Take 1  capsule by mouth 2 times daily 1/22/21  Yes ProviderGeorge MD   EPINEPHrine (EPIPEN) 0.3 MG/0.3ML SOAJ injection INJECT 0.3 ML INTO THE APPROPRIATE MUSCLE AS DIRECTED BY PRESCRIBER 1 (ONE) TIME FOR 1 DOSE. 9/19/19   ProviderGeorge MD       Past Medical History:  Past Medical History:   Diagnosis Date    COPD (chronic obstructive pulmonary disease) (HCC)     Hypertension        Past Surgical History:  Past Surgical History:   Procedure Laterality Date    APPENDECTOMY      CHOLECYSTECTOMY      COLON SURGERY  04/2024    Dr. Rebolledo release of ahdesion    COLONOSCOPY  1995    Per pt -- per doctor not supposed to have another    SPINE SURGERY      X2    TONSILLECTOMY AND ADENOIDECTOMY      UPPER GASTROINTESTINAL ENDOSCOPY  09/12/2023    normal per pt       Social History:  Social History     Tobacco Use    Smoking status: Never    Smokeless tobacco: Never   Vaping Use    Vaping status: Never Used   Substance Use Topics    Alcohol use: Never    Drug use: Never       Vital Signs:   Vitals:    03/11/25 1321   BP: (!) 177/84   Pulse: 68   Resp: 18   Temp: 97.8 °F (36.6 °C)   SpO2: 97%        Physical Exam:  Cardiac:  [x]WNL  []Comments:  Pulmonary:  [x]WNL   []Comments:  Neuro/Mental Status:  [x]WNL  []Comments:  Abdominal:  [x]WNL    []Comments:  Other:   []WNL  []Comments:    Informed Consent:  The risks and benefits of the procedure have been discussed with either the patient or if they cannot consent, their representative.    Assessment:  Patient examined and appropriate for planned sedation and procedure.     Plan:  Proceed with planned sedation and procedure as above.         Sentihl Levin MD

## 2025-03-11 NOTE — DISCHARGE INSTRUCTIONS
POST-OP ORDERS: ENDOSCOPY & COLONOSCOPY:  1. Rest today.  2. DO NOT eat or drink until wide awake; eat your usual diet today in moderate amount only.  3. DO NOT drive today.  4. Call physician if you have severe pain, vomiting, fever, rectal bleeding or black bowel movements.  5.  If a biopsy was taken or a polyp removed, you should expect to hear results in about 21 days.  If you have heard nothing from your physician by then, call the office for results.  6.  Discharge home when patient awake, vitals signs stable and tolerating liquids.  7. Call with questions or concerns [phone].    1.  Await path results, the patient will be contacted in 7-10 days with biopsy results.  If no evidence of dysplasia, repeat EGD in 3 years for surveillance of short segment Jernigan's esophagus like changes noted today  2.  Continue medications for constipation including Linzess along with milk of mag 30 to 60 mL/day with or without MiraLAX 17 g p.o. once or twice daily to be titrated to a desired effect of 1-2 soft stools per day not requiring excessive straining or defecation  3.  If the patient consents, schedule colonoscopy in 3 to 4 weeks with a 2-day clear liquid diet and a 2-day prep using Plenvu or a similar solution assisted by Zofran 4 mg p.o. every 4 hours as needed    - Resume previous meds and diet  - GI clinic f/u 6-8 weeks with Ms. Horton   - Keep scheduled f/u appts with other MDs     - NO NSAIDs x 2 weeks   NSAIDS (Non-steroidal Anti-Inflammatory)    You have been directed by your physician to avoid any NSAID's; the following medications are a list of those to avoid. If you think that you are taking any NSAID's notify your physician.    Over The Counter  Advil                      Motrin  Nuprin                   Ibuprofen  Midol                     Aleve  Naproxen              Orudis  Aspirin                   Mariia-Tarpley    Prescriptions and Generics  Cataflam              Relafen  Voltaren

## 2025-03-11 NOTE — ANESTHESIA PRE PROCEDURE
Department of Anesthesiology  Preprocedure Note       Name:  Felisa Flores   Age:  74 y.o.  :  1950                                          MRN:  [id number]         Date:  3/11/2025      Surgeon: Surgeon(s):  Senthil Levin MD    Procedure: Procedure(s):  ESOPHAGOGASTRODUODENOSCOPY BIOPSY    Medications prior to admission:   Prior to Admission medications    Medication Sig Start Date End Date Taking? Authorizing Provider   hyoscyamine (LEVSIN/SL) 0.125 MG sublingual tablet PLACE 1 TABLET UNDER THE TONGUE EVERY 4 HOURS AS NEEDED FOR CRAMPING. 25  Yes Beatrice Horton APRN   linaclotide (LINZESS) 145 MCG capsule Take 1 capsule by mouth every morning (before breakfast)   Yes Provider, MD George   sucralfate (CARAFATE) 1 GM tablet Take 1 tablet by mouth in the morning and at bedtime 25  Yes Beatrice Horton APRN   pantoprazole (PROTONIX) 40 MG tablet Take 1 tablet by mouth daily   Yes Provider, Historical, MD   fexofenadine (ALLEGRA) 180 MG tablet Take 1 tablet by mouth daily   Yes Provider, Historical, MD   furosemide (LASIX) 40 MG tablet Take 1 tablet by mouth in the morning and 1 tablet in the evening.   Yes Provider, Historical, MD   ALPRAZolam (XANAX) 1 MG tablet TAKE 1 TABLET 4 TIMES A DAY AS NEEDED FOR ANXIETY 23  Yes Provider, Historical, MD   primidone (MYSOLINE) 50 MG tablet  23  Yes Provider, Historical, MD   albuterol sulfate HFA (PROVENTIL;VENTOLIN;PROAIR) 108 (90 Base) MCG/ACT inhaler Inhale 2 puffs into the lungs every 6 hours as needed   Yes Provider, Historical, MD   ondansetron (ZOFRAN) 4 MG tablet TAKE 1 TABLET 4 TIMES A DAY AS NEEDED 23  Yes Provider, Historical, MD   potassium chloride (KLOR-CON M) 20 MEQ TBCR extended release tablet TAKE 1 TABLET BY MOUTH EVERY DAY ORAL 90 23  Yes Provider, MD George   hydrALAZINE (APRESOLINE) 25 MG tablet  23  Yes Provider, MD Georeg   dicyclomine (BENTYL) 10 MG capsule Take 1 capsule by

## 2025-03-13 ENCOUNTER — RESULTS FOLLOW-UP (OUTPATIENT)
Dept: LAB | Age: 75
End: 2025-03-13

## 2025-03-17 ENCOUNTER — HOSPITAL ENCOUNTER (OUTPATIENT)
Age: 75
Setting detail: OUTPATIENT SURGERY
Discharge: HOME | End: 2025-03-17
Attending: INTERNAL MEDICINE | Admitting: INTERNAL MEDICINE
Payer: MEDICAID

## 2025-03-17 ENCOUNTER — ANESTHESIA (OUTPATIENT)
Dept: ENDOSCOPY | Age: 75
End: 2025-03-17
Payer: MEDICAID

## 2025-03-17 ENCOUNTER — ANESTHESIA EVENT (OUTPATIENT)
Dept: ENDOSCOPY | Age: 75
End: 2025-03-17
Payer: MEDICAID

## 2025-03-17 ENCOUNTER — ANCILLARY PROCEDURE (OUTPATIENT)
Dept: ENDOSCOPY | Age: 75
End: 2025-03-17
Attending: INTERNAL MEDICINE
Payer: MEDICAID

## 2025-03-17 VITALS
WEIGHT: 145 LBS | HEIGHT: 60 IN | TEMPERATURE: 98.2 F | SYSTOLIC BLOOD PRESSURE: 199 MMHG | RESPIRATION RATE: 18 BRPM | OXYGEN SATURATION: 99 % | DIASTOLIC BLOOD PRESSURE: 93 MMHG | BODY MASS INDEX: 28.47 KG/M2 | HEART RATE: 79 BPM

## 2025-03-17 DIAGNOSIS — K59.00 CONSTIPATION, UNSPECIFIED CONSTIPATION TYPE: ICD-10-CM

## 2025-03-17 DIAGNOSIS — R10.13 EPIGASTRIC PAIN: ICD-10-CM

## 2025-03-17 DIAGNOSIS — K62.5 RB (RECTAL BLEEDING): ICD-10-CM

## 2025-03-17 DIAGNOSIS — R10.84 GENERALIZED ABDOMINAL PAIN: ICD-10-CM

## 2025-03-17 PROCEDURE — 88305 TISSUE EXAM BY PATHOLOGIST: CPT

## 2025-03-17 PROCEDURE — 7100000011 HC PHASE II RECOVERY - ADDTL 15 MIN: Performed by: INTERNAL MEDICINE

## 2025-03-17 PROCEDURE — 7100000010 HC PHASE II RECOVERY - FIRST 15 MIN: Performed by: INTERNAL MEDICINE

## 2025-03-17 PROCEDURE — 45385 COLONOSCOPY W/LESION REMOVAL: CPT | Performed by: INTERNAL MEDICINE

## 2025-03-17 PROCEDURE — 3700000001 HC ADD 15 MINUTES (ANESTHESIA): Performed by: INTERNAL MEDICINE

## 2025-03-17 PROCEDURE — 3609010600 HC COLONOSCOPY POLYPECTOMY SNARE/COLD BIOPSY: Performed by: INTERNAL MEDICINE

## 2025-03-17 PROCEDURE — 3700000000 HC ANESTHESIA ATTENDED CARE: Performed by: INTERNAL MEDICINE

## 2025-03-17 PROCEDURE — C1889 IMPLANT/INSERT DEVICE, NOC: HCPCS | Performed by: INTERNAL MEDICINE

## 2025-03-17 PROCEDURE — 2580000003 HC RX 258: Performed by: INTERNAL MEDICINE

## 2025-03-17 PROCEDURE — 6360000002 HC RX W HCPCS: Performed by: NURSE ANESTHETIST, CERTIFIED REGISTERED

## 2025-03-17 PROCEDURE — 2709999900 HC NON-CHARGEABLE SUPPLY: Performed by: INTERNAL MEDICINE

## 2025-03-17 DEVICE — CLIP
Type: IMPLANTABLE DEVICE | Status: FUNCTIONAL
Brand: RESOLUTION 360™ ULTRA CLIP

## 2025-03-17 DEVICE — WORKING LENGTH 235CM, WORKING CHANNEL 2.8MM
Type: IMPLANTABLE DEVICE | Status: FUNCTIONAL
Brand: RESOLUTION 360 CLIP

## 2025-03-17 RX ORDER — LIDOCAINE HYDROCHLORIDE 10 MG/ML
INJECTION, SOLUTION EPIDURAL; INFILTRATION; INTRACAUDAL; PERINEURAL
Status: DISCONTINUED | OUTPATIENT
Start: 2025-03-17 | End: 2025-03-17 | Stop reason: SDUPTHER

## 2025-03-17 RX ORDER — SODIUM CHLORIDE, SODIUM LACTATE, POTASSIUM CHLORIDE, CALCIUM CHLORIDE 600; 310; 30; 20 MG/100ML; MG/100ML; MG/100ML; MG/100ML
INJECTION, SOLUTION INTRAVENOUS CONTINUOUS
Status: DISCONTINUED | OUTPATIENT
Start: 2025-03-17 | End: 2025-03-17 | Stop reason: HOSPADM

## 2025-03-17 RX ORDER — PROPOFOL 10 MG/ML
INJECTION, EMULSION INTRAVENOUS
Status: DISCONTINUED | OUTPATIENT
Start: 2025-03-17 | End: 2025-03-17 | Stop reason: SDUPTHER

## 2025-03-17 RX ADMIN — PROPOFOL 260 MG: 10 INJECTION, EMULSION INTRAVENOUS at 13:14

## 2025-03-17 RX ADMIN — SODIUM CHLORIDE, SODIUM LACTATE, POTASSIUM CHLORIDE, AND CALCIUM CHLORIDE: .6; .31; .03; .02 INJECTION, SOLUTION INTRAVENOUS at 12:20

## 2025-03-17 RX ADMIN — LIDOCAINE HYDROCHLORIDE 50 MG: 10 INJECTION, SOLUTION EPIDURAL; INFILTRATION; INTRACAUDAL; PERINEURAL at 13:14

## 2025-03-17 ASSESSMENT — PAIN - FUNCTIONAL ASSESSMENT
PAIN_FUNCTIONAL_ASSESSMENT: 0-10
PAIN_FUNCTIONAL_ASSESSMENT: NONE - DENIES PAIN
PAIN_FUNCTIONAL_ASSESSMENT: NONE - DENIES PAIN

## 2025-03-17 ASSESSMENT — PAIN DESCRIPTION - DESCRIPTORS: DESCRIPTORS: CRAMPING

## 2025-03-17 NOTE — ANESTHESIA PRE PROCEDURE
Department of Anesthesiology  Preprocedure Note       Name:  Felisa Flores   Age:  74 y.o.  :  1950                                          MRN:  [id number]         Date:  3/17/2025      Surgeon: Surgeon(s):  Senthil Levin MD    Procedure: Procedure(s):  COLONOSCOPY DIAGNOSTIC    Medications prior to admission:   Prior to Admission medications    Medication Sig Start Date End Date Taking? Authorizing Provider   hyoscyamine (LEVSIN/SL) 0.125 MG sublingual tablet PLACE 1 TABLET UNDER THE TONGUE EVERY 4 HOURS AS NEEDED FOR CRAMPING. 25   Beatrice Horton APRN   linaclotide (LINZESS) 145 MCG capsule Take 1 capsule by mouth every morning (before breakfast)    George Wright MD   sucralfate (CARAFATE) 1 GM tablet Take 1 tablet by mouth in the morning and at bedtime 25   Beatrice Horton APRN   pantoprazole (PROTONIX) 40 MG tablet Take 1 tablet by mouth daily    George Wright MD   fexofenadine (ALLEGRA) 180 MG tablet Take 1 tablet by mouth daily    George Wright MD   furosemide (LASIX) 40 MG tablet Take 1 tablet by mouth in the morning and 1 tablet in the evening.    George Wright MD   ALPRAZolam (XANAX) 1 MG tablet TAKE 1 TABLET 4 TIMES A DAY AS NEEDED FOR ANXIETY 23   George Wright MD   primidone (MYSOLINE) 50 MG tablet  23   George Wright MD   albuterol sulfate HFA (PROVENTIL;VENTOLIN;PROAIR) 108 (90 Base) MCG/ACT inhaler Inhale 2 puffs into the lungs every 6 hours as needed    George Wright MD   ondansetron (ZOFRAN) 4 MG tablet TAKE 1 TABLET 4 TIMES A DAY AS NEEDED 23   George Wright MD   potassium chloride (KLOR-CON M) 20 MEQ TBCR extended release tablet TAKE 1 TABLET BY MOUTH EVERY DAY ORAL 90 23   George Wright MD   hydrALAZINE (APRESOLINE) 25 MG tablet  23   George Wright MD   dicyclomine (BENTYL) 10 MG capsule Take 1 capsule by mouth 4 times daily (before meals and nightly)

## 2025-03-17 NOTE — OP NOTE
Patient: Felisa Flores : 1950  Med Rec#: [id number] Acc#: [id number]   Primary Care Provider Trino Amado MD    Date of Procedure:  3/17/2025    Endoscopist: Senthil Levin MD, MD    Referring Provider: Trino Amado MD,     Operation Performed: Colonoscopy up to the cecum with    Hot snare resection of a descending colon polyp followed by   application of 4 metallic hemostatic clips at the site of polypectomy to control post polypectomy bleeding successfully  A suboptimal exam due to fair to poor prep    Indications: 1. Generalized abdominal pain  2. Rectal bleeding  3. Constipation, unspecified constipation type  4. Nausea and vomiting, unspecified vomiting type    Anesthesia:  Sedation was administered by anesthesia who monitored the patient during the procedure.    I met with Felisa Flores prior to procedure. We discussed the procedure itself, and I have discussed the risks of endoscopy (including-- but not limited to-- pain, discomfort, bleeding potentially requiring second endoscopic procedure and/or blood transfusion, organ perforation requiring operative repair, damage to organs near the colon, infection, aspiration, cardiopulmonary/allergic reaction), benefits, indications to endoscopy. Additionally, we discussed options other than colonoscopy. The patient expressed understanding. All questions answered. The patient decided to proceed with the procedure.  Signed informed consent was placed on the chart.    Blood Loss: minimal\    Withdrawal time: More than 10 minutes  Bowel Prep: Fair to poor with thick solid and semisolid stool scattered in segments throughout the colon obscuring the underlying mucosa. Small lesions including polyps may have been missed.     Complications: no immediate complications    DESCRIPTION OF PROCEDURE:     A time out was performed. After written informed consent was obtained, the patient was placed in the left lateral position.     The perianal area

## 2025-03-17 NOTE — H&P
Patient Name: Felisa Flores  : 1950  MRN: [id number]  DATE: 25    Allergies:   Allergies   Allergen Reactions    Butorphanol Hives    Cefaclor Anaphylaxis    Cephalexin Anaphylaxis and Rash    Sulfa Antibiotics Other (See Comments) and Swelling     Caused her lockjaw - a spoon stuck in her mouth      Levofloxacin Rash    Quetiapine Itching     Couldn't sleep and felt like bugs were crawling on her.        ENDOSCOPY  History and Physical    Procedure:    [x] Diagnostic Colonoscopy       [] Screening Colonoscopy  [] EGD      [] ERCP      [] EUS       [] Other    [x] Previous office notes/History and Physical reviewed from the patients chart. Please see EMR for further details of HPI. I have examined the patient's status immediately prior to the procedure and:      Indications/HPI:      1. Generalized abdominal pain  2. Rectal bleeding  3. Constipation, unspecified constipation type  4. Nausea and vomiting, unspecified vomiting type    []Abdominal Pain   []Cancer- GI/Lung     []Fhx of colon CA/polyps  []History of Polyps  []Barretts            []Melena  []Abnormal Imaging              []Dysphagia              []Persistent Pneumonia   []Anemia                            []Food Impaction        []History of Polyps  [] GI Bleed             []Pulmonary nodule/Mass   []Change in bowel habits []Heartburn/Reflux  []Rectal Bleed (BRBPR)  []Chest Pain - Non Cardiac []Heme (+) Stool []Ulcers  []Constipation  []Hemoptysis  []Varices  []Diarrhea  []Hypoxemia    []Nausea/Vomiting   []Screening   []Crohns/Colitis  []Other:     Anesthesia:   [x] MAC [] Moderate Sedation   [] General   [] None     ROS: 12 pt Review of Symptoms was negative unless mentioned above    Medications:   Prior to Admission medications    Medication Sig Start Date End Date Taking? Authorizing Provider   hyoscyamine (LEVSIN/SL) 0.125 MG sublingual tablet PLACE 1 TABLET UNDER THE TONGUE EVERY 4 HOURS AS NEEDED FOR CRAMPING. 25  Yes  3/17/2025 7/11/17   ProviderGeorge MD   Multiple Vitamins-Minerals (PRESERVISION AREDS 2) CAPS Take 1 capsule by mouth 2 times daily 1/22/21   ProviderGeorge MD       Past Medical History:  Past Medical History:   Diagnosis Date    Constipation     COPD (chronic obstructive pulmonary disease) (HCC)     Hypertension     On home oxygen therapy     uses on prn basis at 2l/m@ n/c    Perforation of rectum (HCC)        Past Surgical History:  Past Surgical History:   Procedure Laterality Date    APPENDECTOMY      CHOLECYSTECTOMY      COLON SURGERY  04/2024    Dr. Rebolledo release of ahdesion    COLONOSCOPY  1995    Per pt -- per doctor not supposed to have another    SPINE SURGERY      X2    TONSILLECTOMY AND ADENOIDECTOMY      UPPER GASTROINTESTINAL ENDOSCOPY  09/12/2023    normal per pt    UPPER GASTROINTESTINAL ENDOSCOPY N/A 03/11/2025    Dr Levin, (-) sprue, (-) EOE, SSBE like changes,       Social History:  Social History     Tobacco Use    Smoking status: Never    Smokeless tobacco: Never   Vaping Use    Vaping status: Never Used   Substance Use Topics    Alcohol use: Never    Drug use: Never       Vital Signs:   Vitals:    03/17/25 1204   BP: (!) 186/73   Pulse: 81   Resp: 18   Temp: 99.6 °F (37.6 °C)   SpO2: 98%        Physical Exam:  Cardiac:  [x]WNL  []Comments:  Pulmonary:  [x]WNL   []Comments:  Neuro/Mental Status:  [x]WNL  []Comments:  Abdominal:  [x]WNL    []Comments:  Other:   []WNL  []Comments:    Informed Consent:  The risks and benefits of the procedure have been discussed with either the patient or if they cannot consent, their representative.    Assessment:  Patient examined and appropriate for planned sedation and procedure.     Plan:  Proceed with planned sedation and procedure as above.         Senthil Levin MD

## 2025-03-17 NOTE — DISCHARGE INSTRUCTIONS
1. Repeat colonoscopy: pending pathology -if no evidence of dysplasia or malignancy, in 3 to 6 months for follow-up and to confirm healing of the polypectomy site.  2. Await biopsy results-you will receive a letter with your results within 7-10 days  - Resume previous meds and diet  - GI clinic f/u 6-8 weeks with Ms. Horton    - Keep scheduled f/u appts with other MDs   - NO NSAIDs x 2 weeks     Colonoscopy: What to Expect at Home  Your Recovery    After the test, you may be bloated or have gas pains. You may need to pass gas. If a biopsy was done or a polyp was removed, you may have streaks of blood in your stool (feces) for a few days. Problems such as heavy rectal bleeding may not occur until several weeks after the test. This isn't common. But it can happen after polyps are removed.  This care sheet gives you a general idea about how long it will take for you to recover. But each person recovers at a different pace. Follow the steps below to get better as quickly as possible.    How can you care for yourself at home?  Activity    Rest when you feel tired.     You can do your normal activities when it feels okay to do so.   Diet    You may resume your regular diet.     Drink plenty of fluids. This helps to replace the fluids that were lost during the colon prep.     Do not drink alcohol.   Medicines    Your doctor will tell you if and when you can restart your medicines. You will also be given instructions about taking any new medicines.     If you stopped taking aspirin or some other blood thinner, your doctor will tell you when to start taking it again.     If polyps were removed or a biopsy was done during the test, your doctor may tell you not to take aspirin or other anti-inflammatory medicines for a few days. These include ibuprofen (Advil, Motrin) and naproxen (Aleve).   Other instructions    For your safety, do not drive or operate machinery for 24 hours.     Do not sign legal documents or make major  decisions until the medicine wears off and you can think clearly. The anesthesia can make it hard for you to fully understand what you are agreeing to, and cause impaired judgement.     When should you call for help?   Call 911 anytime you think you may need emergency care. For example, call if:    You passed out (lost consciousness).     You pass maroon or bloody stools.     You have trouble breathing.   Call your doctor now or seek immediate medical care if:    You have pain that does not get better after you take pain medicine.     You are sick to your stomach or cannot drink fluids.     You have new or worse belly pain.     You have blood in your stools.     You have a fever.     You cannot pass stools or gas.           NSAIDS (Non-steroidal Anti-Inflammatory)    You have been directed by your physician to avoid any NSAID's; the following medications are a list of those to avoid. If you think that you are taking any NSAID's notify your physician.     Over The Counter    Advil                      Motrin  Nuprin                   Ibuprofen  Midol                     Aleve  Naproxen              Orudis  Aspirin                   Mariia-[place]    Prescriptions and Generics    Cataflam              Relafen  Voltaren               Clinoril  Indocin                 Naproxen  Arthrotec              Lodine  Daypro                 Nalfon  Toradol                Ansaid  Feldene               Meclofenamate  Fenoprofen          Ponstel  Mobic                   Celebrex  Vioxx

## 2025-03-17 NOTE — ANESTHESIA POSTPROCEDURE EVALUATION
Department of Anesthesiology  Postprocedure Note    Patient: Felisa Flores  MRN: [id number]  YOB: 1950  Date of evaluation: 3/17/2025    Procedure Summary       Date: 03/17/25 Room / Location: [place] [address] / [facility]    Anesthesia Start: 1309 Anesthesia Stop: 1337    Procedure: COLONOSCOPY POLYPECTOMY SNARE/BIOPSY Diagnosis:       Epigastric pain      Constipation, unspecified constipation type      Generalized abdominal pain      RB (rectal bleeding)      (Epigastric pain [R10.13])      (Constipation, unspecified constipation type [K59.00])      (Generalized abdominal pain [R10.84])      (RB (rectal bleeding) [K62.5])    Surgeons: Senthil Levin MD Responsible Provider: Vickie Rubi APRN - CRNA    Anesthesia Type: general, TIVA ASA Status: 3            Anesthesia Type: No value filed.    Gio Phase I:      Gio Phase II:      Anesthesia Post Evaluation    Patient location during evaluation: bedside  Patient participation: complete - patient participated  Level of consciousness: sleepy but conscious  Pain score: 0  Airway patency: patent  Nausea & Vomiting: no nausea and no vomiting  Cardiovascular status: hemodynamically stable  Respiratory status: acceptable  Hydration status: stable  Pain management: adequate    No notable events documented.

## 2025-03-18 ENCOUNTER — RESULTS FOLLOW-UP (OUTPATIENT)
Dept: ENDOSCOPY | Age: 75
End: 2025-03-18

## 2025-03-19 ENCOUNTER — TELEPHONE (OUTPATIENT)
Dept: GASTROENTEROLOGY | Age: 75
End: 2025-03-19

## 2025-03-19 NOTE — TELEPHONE ENCOUNTER
3-  Per Dr Levin op note recommendations            Routed to Nahun Rodriguez to schedule colonoscopy in 3-6 months

## 2025-04-21 ENCOUNTER — TELEPHONE (OUTPATIENT)
Dept: GASTROENTEROLOGY | Age: 75
End: 2025-04-21

## 2025-04-21 NOTE — TELEPHONE ENCOUNTER
04-  Called the patient   She stated that she is using the Miralax bid and Linzess 145 with occasional Milk of Mag.  Stated that Dr Levin told her that he wanted her to go at least 1-2 times daily soft.  However with the Miralax bid and Linzess 145 some times it runs her to the bathroom a lot more than that with formed stools.  Stated both of the medications on 03-    Wanted to see if can make any adjustments to medications     Advised will send message to CT APRN for recommendations

## 2025-04-21 NOTE — TELEPHONE ENCOUNTER
Patient needs a nurse called her back she said all the medication that she was put on is causing her issue. Patient can be reached @[phone]       Thank you

## 2025-04-28 ENCOUNTER — TELEPHONE (OUTPATIENT)
Dept: GASTROENTEROLOGY | Age: 75
End: 2025-04-28

## 2025-04-30 DIAGNOSIS — K92.1 BLOOD IN STOOL: ICD-10-CM

## 2025-04-30 DIAGNOSIS — R19.7 DIARRHEA, UNSPECIFIED TYPE: Primary | ICD-10-CM

## 2025-05-06 ENCOUNTER — TELEPHONE (OUTPATIENT)
Dept: GASTROENTEROLOGY | Age: 75
End: 2025-05-06

## 2025-05-06 DIAGNOSIS — K59.04 CHRONIC IDIOPATHIC CONSTIPATION: Primary | ICD-10-CM

## 2025-05-06 DIAGNOSIS — K59.04 CHRONIC IDIOPATHIC CONSTIPATION: ICD-10-CM

## 2025-05-06 RX ORDER — TENAPANOR HYDROCHLORIDE 53.2 MG/1
1 TABLET ORAL 2 TIMES DAILY
Qty: 60 TABLET | Refills: 5 | Status: SHIPPED | OUTPATIENT
Start: 2025-05-06 | End: 2025-05-09

## 2025-05-06 NOTE — TELEPHONE ENCOUNTER
05- Called [facility] spoke with Joey.  He stated that the patient called this am about sending her  for the collection containers today.  He stated that the C Diff can be ran in house but the that the Gi Panel is a send out and will take about 4 days.           Recommend per CT APRN to rescheduled apt on 05- til after lab results are back.       Called the patient advised that we are needing to  her apt as that she has not completed the Gi Panel and the C Diff test.      She stated that she is planning to have her  go and get the containers today.  She complains that she is having abdominal cramping/pain about 6-7 out of 10, 11 episodes of watery diarrhea per day, weak and nauseated.    Explained that since She is having all of these symptoms along with cramping/pain 6-7 out of 10 but gets worse at times, along with weakness then she needed to proceed to the ER. She has been taking the Linzess, Milk of Mag and Miralax.  Advised that per CT APRN on 04- she has recommended to decrease the Milk of Mag and Miralax.         She stated that her stools are just like water and runs straight through.  Advised that the Lab orders were faxed on the 30th of April and that we needed the stool test to see if there is something going on.  She stated that they havent had the orders since then as that she has been calling.  Advised that I faxed the orders on 04- and spoke with Joey in the lab this am.  He said that the Gi panel is a send out and will take 4 days.        Questioned if she has tried any OTC Imodium to help with the diarrhea?  She stated no then she gets constipated and has to take Linzess, Milk of Mag and Miralax. Questioned what she ahs been taking the past two days-she stated that she hasn't taken anything this am and wants to try not taking anything and see how she does and if gets constipated then will take the Linzess, Milk of Mag and Miralax.

## 2025-05-06 NOTE — TELEPHONE ENCOUNTER
05- Called and notified patient of recommends as per CT APRN in regards to stopping Linzess, Milk of mag and Miralax and starting Ibsrela 50mg bid       She questioned if her insurance will cover it as that she doesn't have the money to pay for it?  Advised that the pharmacy will have to run it through her insurance but that if a PA is needed then the office will submit it.       Called [facility] spoke with Staci   To check to see if PA is needed for Ibsrela-She stated that it is showing closed formulary so it may need a PA.  She will send it to the office.

## 2025-05-06 NOTE — TELEPHONE ENCOUNTER
She may just need to change her medication,  Lets stop the Linzess, miralax, and milk of magnesia   And I have sent her in a rx for Ibsrella 50 mg BID

## 2025-05-06 NOTE — TELEPHONE ENCOUNTER
05- Patient called back stated that her  is home with the test now what does she need to do?  Recommend to scoop out any stool pieces and take back to the lab today.     She stated that she has tried not taking the Linzess, Milk of Mag and Miralax for 3 days then she ends up constipated then has to restart the medication and that it takes 3 days and she is back to diarrhea.     Question if she has just tried taking the Linzess wo the Milk of Mag and Miralax?  She stated that yes but that just taking the Linzess by itself and then eats things run straight through.      Stated that she just can't keeping going like this! Advised that we are needing the stool test to see is she had something else going on that causes the diarrhea.  Stated that she can't wait until next week to be seen.  Advised that CT APRN is needing the test results to see if something else is going on.      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     Advised that I would send a message to CT APRN to see what she recommends     Routed to CT APRN

## 2025-05-06 NOTE — TELEPHONE ENCOUNTER
Patient called back and said she delivered the stool specimen to [facility] today and it will go out to the lab by 230 today. Results will be back in 3 days.

## 2025-05-07 ENCOUNTER — RESULTS FOLLOW-UP (OUTPATIENT)
Dept: GASTROENTEROLOGY | Age: 75
End: 2025-05-07

## 2025-05-07 DIAGNOSIS — K59.04 CHRONIC IDIOPATHIC CONSTIPATION: ICD-10-CM

## 2025-05-08 DIAGNOSIS — K59.04 CHRONIC IDIOPATHIC CONSTIPATION: Primary | ICD-10-CM

## 2025-05-08 RX ORDER — LUBIPROSTONE 8 UG/1
8 CAPSULE ORAL 2 TIMES DAILY WITH MEALS
Qty: 60 CAPSULE | Refills: 5 | Status: SHIPPED | OUTPATIENT
Start: 2025-05-08

## 2025-05-08 NOTE — TELEPHONE ENCOUNTER
05- Called and LVM for patient to return a call to the office as that her insurance has denied the Ibsrela medication that KAMERON URIBE prescribed       05- Called and LVM for the patient to return a call to the office       Jasmin returned call notified of new medication change as per KAMERON URIBE.  She stated that she has stopped the Linzess, Milk of Mag and Miralax about 3 days ago and now she is starting with constipation.  Mentioned that yesterday she did have 1 bm that was water.  Complains that the Ibsrela was over 2000.00 and that she can't afford it and if a authorization can be done.  Explained that the Prior Auth was done and that we have been waiting for it to come back and it was denied.  She stated that she spoke with Yancy here at the office that said that we could still see about getting the medication. Explained that the denial has came back and that KAMERON URIBE has changed the medication to one of the preferred medications called Lubiprostone.  She questioned if the insurance would cover it and how much it would be as that she had to pay 80.00 for the hyoscyamine?  Explained that I dont work at the pharmacy so I dont have any idea as to how much the prescription will cost that she would have to check with the pharmacy but that it is on her preferred list per the prior auth.     She stated that she is about to have a nervous break down from all of this and had sores all in her mouth and can't ever eat very much.  Questioned if she had an apt with her PCP regarding the sores in her mouth?  She stated that she is going to see some one about getting dentures.  Questioned again if she had contacted her PCP about the sores in her mouth?  She stated that she has an apt on June 1st but that he has put her on Xanax three times daily and it isn't helping as that she has been on it for years.  Explained that she needed to call her PCP to discuss.     Advised once again that KAMERON URIBE has changed her medication

## 2025-05-08 NOTE — TELEPHONE ENCOUNTER
05- Received Denial from [facility] regarding there Ibsrela         Routed to CT APRN

## 2025-05-13 DIAGNOSIS — K59.00 CONSTIPATION, UNSPECIFIED CONSTIPATION TYPE: Primary | ICD-10-CM

## 2025-05-13 RX ORDER — POLYETHYLENE GLYCOL 3350 17 G/17G
17 POWDER, FOR SOLUTION ORAL DAILY
Qty: 1530 G | Refills: 3 | Status: SHIPPED | OUTPATIENT
Start: 2025-05-13 | End: 2026-05-08

## 2025-05-28 ENCOUNTER — TELEPHONE (OUTPATIENT)
Dept: GASTROENTEROLOGY | Age: 75
End: 2025-05-28

## 2025-05-28 NOTE — TELEPHONE ENCOUNTER
eFlisa requests that Staci return their call.  Pt having issues with constipation and diarrhea. Unable to make appts, would lie to discuss medication. The best time to reach her is Anytime.     Thank you.

## 2025-05-28 NOTE — TELEPHONE ENCOUNTER
05- Called the patient   She stated that she is using the Lubiprostone Qd and not using the Miralax or Milk of Mag.  Complains that she is having diarrhea about 6 times daily very soft Type 4 (Soft sausage or snake) but that it started out hard then became soft. Mentions that she is just really weak from all of the diarrhea.     She mentioned that if she eats then she has diarrhea but if she doesn't eat then only goes about 3-4 times daily.     She stated that she was constipated so she took a dose of Miralax.  Questioned as to when that was that she just called on Friday and had diarrhea and was told to only take Lubiprostone QD and no Miralax or Milk of Mag.      Advised will have to send a message to CT APRN for recommendations    She questioned isn't there something that they can give her to eat and go to the bathroom wo all of this diarrhea?      Advised will have to send a message to [facility] APRN for recommendations    She questioned that Dr that done her scopes can't he put her in the hospital and see what is going on?     Advised will have to send a message to CT APRN for recommendations      Routed to CT APRN     Patient has cancelled 04-29-2025x 2, 04-, 05-, 05-, 05-, 05- 05- & 05-.  Her next apt is on 06-           Discussed with CT APRN   She stated that she isn't going to treat the patient any more over the phone.   Patient will have to come in for an apt.     Called the patient notified that she due to her canceling several apts CT APRN stated that she would not treat her over the phone that she will have to be seen in the office.  She questioned if there is something someone can give her in the mean time-advised that per CT APRN she will not treat her over the phone but will have to see her in the office.  She stated that the reason why she has missed several apt is due to diarrhea and unable to make it to her apts.      Recommended that she

## 2025-06-05 ENCOUNTER — TELEPHONE (OUTPATIENT)
Dept: GASTROENTEROLOGY | Age: 75
End: 2025-06-05

## 2025-06-05 NOTE — TELEPHONE ENCOUNTER
06- Patient called [facility] that she was given a medication in the office and before she got home her stomach cramped and had diarrhea last night and has been 9 times today.  Stated that she did take it last night but didn't this am as that she has been in the bathroom 9 times today.  Complains that her stools were hard at first but then ended up soft     She wanted to know if it was the 2000.00 Medication of the new one?  Explained that It was probably the Ibsrela as that we dont have samples of the Trulance/Plecanatide that was sent in.     She stated that the new one has to be ordered and will be in tomorrow and her copay is zero.     She questioned if she should take the 2nd Ibsrela-Stated no that if she has been in the bathroom 9 times then she doesn't need to take another one to cause even more diarrhea.     Advised that with these types of medication that it can take up to two weeks to get in her system and to get to working and that sometimes at first the patient can have diarrhea or constipation.     Routed to CT RONY

## 2025-06-26 ENCOUNTER — OFFICE VISIT (OUTPATIENT)
Dept: GASTROENTEROLOGY | Age: 75
End: 2025-06-26
Payer: MEDICARE

## 2025-06-26 VITALS
SYSTOLIC BLOOD PRESSURE: 122 MMHG | HEIGHT: 60 IN | DIASTOLIC BLOOD PRESSURE: 80 MMHG | HEART RATE: 78 BPM | BODY MASS INDEX: 29.06 KG/M2 | OXYGEN SATURATION: 98 % | WEIGHT: 148 LBS

## 2025-06-26 DIAGNOSIS — M54.50 CHRONIC BILATERAL LOW BACK PAIN, UNSPECIFIED WHETHER SCIATICA PRESENT: ICD-10-CM

## 2025-06-26 DIAGNOSIS — K58.1 IRRITABLE BOWEL SYNDROME WITH CONSTIPATION: ICD-10-CM

## 2025-06-26 DIAGNOSIS — G89.29 CHRONIC BILATERAL LOW BACK PAIN, UNSPECIFIED WHETHER SCIATICA PRESENT: ICD-10-CM

## 2025-06-26 DIAGNOSIS — G47.00 INSOMNIA, UNSPECIFIED TYPE: Primary | ICD-10-CM

## 2025-06-26 DIAGNOSIS — K59.04 CHRONIC IDIOPATHIC CONSTIPATION: ICD-10-CM

## 2025-06-26 PROCEDURE — 99214 OFFICE O/P EST MOD 30 MIN: CPT | Performed by: NURSE PRACTITIONER

## 2025-06-26 PROCEDURE — 1159F MED LIST DOCD IN RCRD: CPT | Performed by: NURSE PRACTITIONER

## 2025-06-26 PROCEDURE — 1123F ACP DISCUSS/DSCN MKR DOCD: CPT | Performed by: NURSE PRACTITIONER

## 2025-06-26 RX ORDER — TENAPANOR HYDROCHLORIDE 53.2 MG/1
TABLET ORAL
COMMUNITY

## 2025-06-26 RX ORDER — DIPHENHYDRAMINE HCL 50 MG
50 CAPSULE ORAL EVERY 6 HOURS PRN
COMMUNITY

## 2025-06-26 RX ORDER — FLUCONAZOLE 150 MG/1
150 TABLET ORAL ONCE
COMMUNITY

## 2025-06-26 RX ORDER — ACETAMINOPHEN 325 MG/1
650 TABLET ORAL EVERY 6 HOURS PRN
COMMUNITY

## 2025-06-26 RX ORDER — TENAPANOR HYDROCHLORIDE 53.2 MG/1
1 TABLET ORAL 2 TIMES DAILY
Qty: 60 TABLET | Refills: 5 | Status: SHIPPED | OUTPATIENT
Start: 2025-06-26 | End: 2025-07-01 | Stop reason: SDUPTHER

## 2025-06-30 ENCOUNTER — TELEPHONE (OUTPATIENT)
Dept: GASTROENTEROLOGY | Age: 75
End: 2025-06-30

## 2025-06-30 ASSESSMENT — ENCOUNTER SYMPTOMS
BLOOD IN STOOL: 0
VOMITING: 0
NAUSEA: 0
ABDOMINAL DISTENTION: 0
BACK PAIN: 1
RECTAL PAIN: 0
CHOKING: 0
COUGH: 0
ANAL BLEEDING: 0
SHORTNESS OF BREATH: 0
DIARRHEA: 0
TROUBLE SWALLOWING: 0
ABDOMINAL PAIN: 0
CONSTIPATION: 1

## 2025-06-30 NOTE — TELEPHONE ENCOUNTER
06- Patient called [facility] that she is having some rectal bleeding and wanted to discuss to see what is going on.  She would like a call back       06- Called the patient   She stated that she has had bright red rectal bleeding x 2 days.  Stated that She is using Ibsrela bid.  Complains that she is going at least once daily but didn't go yesterday except for a little liquid which really burned.  She also complains of bloating and being uncomfortable.     Questioned if she was using any Milk of Mag or Miralax?  She stated no but thought about going and getting some.  Advised not to do anything until after I discuss with the APRN first.     She questioned if her Ibsrela has been approved?  Advised that it was denied on 05-.  She stated that she was just in the office and that we were suppose to send in another Prior Auth-notified that I wasn't familiar with that but will check on it.  She questioned if she needed to call her insurance-explained that I will have to call and see what it going to and may have to file an appeal. Explained that if she runs out of samples then can stop by the office and pick more up.       Op note 03- Dr Levin           Routed to  APRN

## 2025-06-30 NOTE — TELEPHONE ENCOUNTER
06- Called and notified of recommendation as per [facility] APRN     Notified that CT APRN sent the rx for Ibsrela to [facility]-Local pharmacies doesn't carry this medication as that it will need to be sent to a Specialty pharmacy call [facility].  She stated that [facility] will carry it-notified that she is more than welcome to check with them but the rx will have to se sent to [facility] as that it is a specialty medication and an appeal will have to be done as that her insurance has already denied it on 05-      Routed to CT APRN   Need rx for Ibsrela sent to [facility]    Rx sent to [facility]      07-1-2025 Faxed in Appeal to  [employer] for Ibsrela F) [phone]  Called [facility] Ph [phone] spoke with De\"Jemima   Notified that Ibsrela has been denied and appeal has been faxed.   Dx updated per CT APRN who has added DX of K58.1 IBS w C       Called [facility] [phone]  Spoke with   Notified that Dx has been added to include K58.1 IBS w C   He stated to faxed in clinicals to [phone]    Faxed in Clinical w Added dx of K 58.1 IBS w C to [facility]                          .

## 2025-06-30 NOTE — TELEPHONE ENCOUNTER
Continue Ibsrela.  Monitor for signs of continued bleeding.  Last Colonoscopy noted internal hemorrhoids.  However, if bleeding becomes severe recommend ER evaluation due to her hx ischemic colitis.

## 2025-07-01 RX ORDER — TENAPANOR HYDROCHLORIDE 53.2 MG/1
1 TABLET ORAL 2 TIMES DAILY
Qty: 60 TABLET | Refills: 5 | Status: SHIPPED | OUTPATIENT
Start: 2025-07-01

## 2025-07-01 NOTE — PROGRESS NOTES
Not At Risk (4/28/2024)    Received from [facility]    Humiliation, Afraid, Rape, and Kick questionnaire     Fear of Current or Ex-Partner: No     Emotionally Abused: No     Physically Abused: No     Sexually Abused: No    [facility] from [facility]    Housing Stability       Current Outpatient Medications   Medication Sig Dispense Refill    Tenapanor HCl (IBSRELA) 50 MG TABS Take 1 tablet by mouth in the morning and at bedtime 60 tablet 5    acetaminophen (TYLENOL) 325 MG tablet Take 2 tablets by mouth every 6 hours as needed for Pain      Tenapanor HCl (IBSRELA) 50 MG TABS Take by mouth      diphenhydrAMINE (BENADRYL) 50 MG capsule Take 1 capsule by mouth every 6 hours as needed for Itching      fluconazole (DIFLUCAN) 150 MG tablet Take 1 tablet by mouth once      tiZANidine (ZANAFLEX) 4 MG tablet Take 1 tablet by mouth in the morning and 1 tablet in the evening. 60 tablet 1    clonazePAM (KLONOPIN) 1 MG tablet Take 1 tablet by mouth 2 times daily as needed for Anxiety.      hyoscyamine (LEVSIN/SL) 0.125 MG sublingual tablet PLACE 1 TABLET UNDER THE TONGUE EVERY 4 HOURS AS NEEDED FOR CRAMPING. 540 tablet 1    sucralfate (CARAFATE) 1 GM tablet Take 1 tablet by mouth in the morning and at bedtime 120 tablet 3    pantoprazole (PROTONIX) 40 MG tablet Take 1 tablet by mouth daily      furosemide (LASIX) 40 MG tablet Take 1 tablet by mouth in the morning and 1 tablet in the evening.      albuterol sulfate HFA (PROVENTIL;VENTOLIN;PROAIR) 108 (90 Base) MCG/ACT inhaler Inhale 2 puffs into the lungs every 6 hours as needed      ondansetron (ZOFRAN) 4 MG tablet TAKE 1 TABLET 4 TIMES A DAY AS NEEDED      potassium chloride (KLOR-CON M) 20 MEQ TBCR extended release tablet TAKE 1 TABLET BY MOUTH EVERY DAY ORAL 90      hydrALAZINE (APRESOLINE) 25 MG tablet       dilTIAZem HCl  MG TB24 TAKE 1 TABLET BY MOUTH EVERY DAY FOR 90 DAYS      Multiple Vitamins-Minerals

## 2025-07-03 NOTE — TELEPHONE ENCOUNTER
07- Received an approval for Ibsrela 50mg approval from 05- until further notice             Called the patient LVM that Ibsrela has been approved and that she will need to contact [facility]

## 2025-07-11 ENCOUNTER — TELEPHONE (OUTPATIENT)
Dept: GASTROENTEROLOGY | Age: 75
End: 2025-07-11

## 2025-07-11 NOTE — TELEPHONE ENCOUNTER
07- Patient called [facility] that she hasn't had a BM since Monday.  She started Miralax on Monday and still no bm so today she took Milk of Citrate this am.         Called the patient   She stated that she has been taking the Ibsrela bid, Miralax Qd since Monday and then today she took 3/4 bottle of Mag Citrate.  Stated that she fell and hurt her back and started taking 1/2 tablet of Percocet on Tuesday, Wed and Thursday that she has had on hand. States that her bowel are moving again.     Routed to CT APRN

## 2025-07-11 NOTE — TELEPHONE ENCOUNTER
Discussed with CT APRN   Recommend to decrease the Percocet.  Continue current regiment of Ibsrela bid and Miralax prn as needed         Called the patient   Notified of recommendation as per CT APRN   Recommend to increase fluid     She questioned what she needed to do about her back-advised that she check with her PCP and not just take medication that she has on hand.  She stated that the reason she took the Percocet was because she fell and her leg was numb then stated her leg hurt then said it was fine.      Explained that if she fell and her leg was numb or hurt then it would be recommended that she contact her PCP or the ER rather than to just take some pain med she had on hand

## 2025-08-29 ENCOUNTER — TELEPHONE (OUTPATIENT)
Dept: GASTROENTEROLOGY | Age: 75
End: 2025-08-29

## (undated) DEVICE — CLEANING BRUSH - SINGLE USE: Brand: SAFEGUIDE®

## (undated) DEVICE — SINGLE PORT MANIFOLD: Brand: NEPTUNE 2

## (undated) DEVICE — SNARE ENDOSCP L240CM LOOP W13MM SHTH DIA2.4MM SM OVL FLX

## (undated) DEVICE — ENDO KIT,LOURDES HOSPITAL: Brand: MEDLINE INDUSTRIES, INC.

## (undated) DEVICE — CLEANING SPONGE: Brand: KOALA™

## (undated) DEVICE — SUPPLEMENT DIGESTIVE H2O SOL GI-EASE

## (undated) DEVICE — CANNULA NSL AD L7FT DIV O2 CO2 W/ M LUERLOCK TRMPT CONN

## (undated) DEVICE — ADAPTER CLEANING PORPOISE CLEANING

## (undated) DEVICE — SINGLE-USE POLYPECTOMY SNARE: Brand: CAPTIVATOR

## (undated) DEVICE — BITE BLOCK ENDOSCP AD 60 FR W/ ADJ STRP PLAS GRN BLOX

## (undated) DEVICE — BRUSH ENDOSCP 2 END CHN HEDGEHOG

## (undated) DEVICE — FORCEPS BX 240CM 2.4MM L NDL RAD JAW 4 M00513334